# Patient Record
Sex: MALE | Race: OTHER | HISPANIC OR LATINO | Employment: FULL TIME | URBAN - METROPOLITAN AREA
[De-identification: names, ages, dates, MRNs, and addresses within clinical notes are randomized per-mention and may not be internally consistent; named-entity substitution may affect disease eponyms.]

---

## 2023-11-27 ENCOUNTER — HOSPITAL ENCOUNTER (INPATIENT)
Facility: HOSPITAL | Age: 43
LOS: 4 days | Discharge: HOME | End: 2023-12-01
Attending: INTERNAL MEDICINE | Admitting: STUDENT IN AN ORGANIZED HEALTH CARE EDUCATION/TRAINING PROGRAM
Payer: COMMERCIAL

## 2023-11-27 ENCOUNTER — APPOINTMENT (OUTPATIENT)
Dept: RADIOLOGY | Facility: HOSPITAL | Age: 43
End: 2023-11-27
Payer: COMMERCIAL

## 2023-11-27 DIAGNOSIS — R79.89 ELEVATED LFTS: Primary | ICD-10-CM

## 2023-11-27 DIAGNOSIS — K83.1 BILIARY OBSTRUCTION (CMS-HCC): ICD-10-CM

## 2023-11-27 DIAGNOSIS — J98.59 MEDIASTINAL MASS: ICD-10-CM

## 2023-11-27 DIAGNOSIS — E11.9 TYPE 2 DIABETES MELLITUS WITHOUT COMPLICATION, UNSPECIFIED WHETHER LONG TERM INSULIN USE (MULTI): ICD-10-CM

## 2023-11-27 LAB
ALBUMIN SERPL BCP-MCNC: 3.7 G/DL (ref 3.4–5)
ALP SERPL-CCNC: 264 U/L (ref 33–120)
ALT SERPL W P-5'-P-CCNC: 495 U/L (ref 10–52)
ANION GAP SERPL CALC-SCNC: 15 MMOL/L (ref 10–20)
APTT PPP: 31 SECONDS (ref 27–38)
AST SERPL W P-5'-P-CCNC: 222 U/L (ref 9–39)
BASOPHILS # BLD AUTO: 0.02 X10*3/UL (ref 0–0.1)
BASOPHILS NFR BLD AUTO: 0.3 %
BILIRUB DIRECT SERPL-MCNC: 3.5 MG/DL (ref 0–0.3)
BILIRUB SERPL-MCNC: 7.1 MG/DL (ref 0–1.2)
BUN SERPL-MCNC: 5 MG/DL (ref 6–23)
CALCIUM SERPL-MCNC: 8.8 MG/DL (ref 8.6–10.6)
CHLORIDE SERPL-SCNC: 103 MMOL/L (ref 98–107)
CO2 SERPL-SCNC: 25 MMOL/L (ref 21–32)
CREAT SERPL-MCNC: 0.88 MG/DL (ref 0.5–1.3)
CRP SERPL-MCNC: 1.03 MG/DL
D DIMER PPP FEU-MCNC: 1281 NG/ML FEU
EOSINOPHIL # BLD AUTO: 0.08 X10*3/UL (ref 0–0.7)
EOSINOPHIL NFR BLD AUTO: 1.2 %
ERYTHROCYTE [DISTWIDTH] IN BLOOD BY AUTOMATED COUNT: 12.7 % (ref 11.5–14.5)
EST. AVERAGE GLUCOSE BLD GHB EST-MCNC: 166 MG/DL
GFR SERPL CREATININE-BSD FRML MDRD: >90 ML/MIN/1.73M*2
GLUCOSE BLD MANUAL STRIP-MCNC: 116 MG/DL (ref 74–99)
GLUCOSE BLD MANUAL STRIP-MCNC: 140 MG/DL (ref 74–99)
GLUCOSE SERPL-MCNC: 121 MG/DL (ref 74–99)
HBA1C MFR BLD: 7.4 %
HCT VFR BLD AUTO: 41.4 % (ref 41–52)
HGB BLD-MCNC: 13.3 G/DL (ref 13.5–17.5)
IMM GRANULOCYTES # BLD AUTO: 0.01 X10*3/UL (ref 0–0.7)
IMM GRANULOCYTES NFR BLD AUTO: 0.2 % (ref 0–0.9)
INR PPP: 1 (ref 0.9–1.1)
LIPASE SERPL-CCNC: 22 U/L (ref 9–82)
LYMPHOCYTES # BLD AUTO: 1.51 X10*3/UL (ref 1.2–4.8)
LYMPHOCYTES NFR BLD AUTO: 23.4 %
MAGNESIUM SERPL-MCNC: 1.95 MG/DL (ref 1.6–2.4)
MCH RBC QN AUTO: 27.4 PG (ref 26–34)
MCHC RBC AUTO-ENTMCNC: 32.1 G/DL (ref 32–36)
MCV RBC AUTO: 85 FL (ref 80–100)
MONOCYTES # BLD AUTO: 0.7 X10*3/UL (ref 0.1–1)
MONOCYTES NFR BLD AUTO: 10.9 %
NEUTROPHILS # BLD AUTO: 4.13 X10*3/UL (ref 1.2–7.7)
NEUTROPHILS NFR BLD AUTO: 64 %
NRBC BLD-RTO: 0 /100 WBCS (ref 0–0)
PLATELET # BLD AUTO: 268 X10*3/UL (ref 150–450)
POTASSIUM SERPL-SCNC: 4 MMOL/L (ref 3.5–5.3)
PROT SERPL-MCNC: 5.9 G/DL (ref 6.4–8.2)
PROTHROMBIN TIME: 11.5 SECONDS (ref 9.8–12.8)
RBC # BLD AUTO: 4.86 X10*6/UL (ref 4.5–5.9)
SODIUM SERPL-SCNC: 139 MMOL/L (ref 136–145)
T4 FREE SERPL-MCNC: 1.29 NG/DL (ref 0.78–1.48)
TSH SERPL-ACNC: 2.67 MIU/L (ref 0.44–3.98)
WBC # BLD AUTO: 6.5 X10*3/UL (ref 4.4–11.3)

## 2023-11-27 PROCEDURE — 2500000005 HC RX 250 GENERAL PHARMACY W/O HCPCS

## 2023-11-27 PROCEDURE — 85025 COMPLETE CBC W/AUTO DIFF WBC: CPT

## 2023-11-27 PROCEDURE — 71275 CT ANGIOGRAPHY CHEST: CPT | Performed by: RADIOLOGY

## 2023-11-27 PROCEDURE — 83615 LACTATE (LD) (LDH) ENZYME: CPT

## 2023-11-27 PROCEDURE — 86140 C-REACTIVE PROTEIN: CPT

## 2023-11-27 PROCEDURE — 2550000001 HC RX 255 CONTRASTS: Performed by: STUDENT IN AN ORGANIZED HEALTH CARE EDUCATION/TRAINING PROGRAM

## 2023-11-27 PROCEDURE — 2500000004 HC RX 250 GENERAL PHARMACY W/ HCPCS (ALT 636 FOR OP/ED)

## 2023-11-27 PROCEDURE — 84439 ASSAY OF FREE THYROXINE: CPT

## 2023-11-27 PROCEDURE — 83036 HEMOGLOBIN GLYCOSYLATED A1C: CPT

## 2023-11-27 PROCEDURE — 2500000001 HC RX 250 WO HCPCS SELF ADMINISTERED DRUGS (ALT 637 FOR MEDICARE OP)

## 2023-11-27 PROCEDURE — 80053 COMPREHEN METABOLIC PANEL: CPT

## 2023-11-27 PROCEDURE — 71275 CT ANGIOGRAPHY CHEST: CPT

## 2023-11-27 PROCEDURE — 84443 ASSAY THYROID STIM HORMONE: CPT

## 2023-11-27 PROCEDURE — 99222 1ST HOSP IP/OBS MODERATE 55: CPT

## 2023-11-27 PROCEDURE — 82248 BILIRUBIN DIRECT: CPT

## 2023-11-27 PROCEDURE — 36415 COLL VENOUS BLD VENIPUNCTURE: CPT

## 2023-11-27 PROCEDURE — 83690 ASSAY OF LIPASE: CPT

## 2023-11-27 PROCEDURE — 83735 ASSAY OF MAGNESIUM: CPT

## 2023-11-27 PROCEDURE — 85379 FIBRIN DEGRADATION QUANT: CPT

## 2023-11-27 PROCEDURE — 1210000001 HC SEMI-PRIVATE ROOM DAILY

## 2023-11-27 PROCEDURE — 85610 PROTHROMBIN TIME: CPT

## 2023-11-27 PROCEDURE — 82947 ASSAY GLUCOSE BLOOD QUANT: CPT

## 2023-11-27 RX ORDER — ACETAMINOPHEN 325 MG/1
325 TABLET ORAL EVERY 6 HOURS PRN
Status: DISCONTINUED | OUTPATIENT
Start: 2023-11-27 | End: 2023-11-27

## 2023-11-27 RX ORDER — DEXTROSE MONOHYDRATE 100 MG/ML
0.3 INJECTION, SOLUTION INTRAVENOUS ONCE AS NEEDED
Status: DISCONTINUED | OUTPATIENT
Start: 2023-11-27 | End: 2023-12-01 | Stop reason: HOSPADM

## 2023-11-27 RX ORDER — CYANOCOBALAMIN (VITAMIN B-12) 250 MCG
TABLET ORAL DAILY
COMMUNITY

## 2023-11-27 RX ORDER — INSULIN LISPRO 100 [IU]/ML
0-5 INJECTION, SOLUTION INTRAVENOUS; SUBCUTANEOUS EVERY 4 HOURS
Status: DISCONTINUED | OUTPATIENT
Start: 2023-11-27 | End: 2023-11-29

## 2023-11-27 RX ORDER — BUSPIRONE HYDROCHLORIDE 5 MG/1
TABLET ORAL 3 TIMES DAILY PRN
COMMUNITY

## 2023-11-27 RX ORDER — LISINOPRIL 10 MG/1
10 TABLET ORAL DAILY
Status: DISCONTINUED | OUTPATIENT
Start: 2023-11-27 | End: 2023-12-01 | Stop reason: HOSPADM

## 2023-11-27 RX ORDER — OXYCODONE HYDROCHLORIDE 5 MG/1
5 TABLET ORAL EVERY 6 HOURS PRN
Status: DISCONTINUED | OUTPATIENT
Start: 2023-11-27 | End: 2023-11-30

## 2023-11-27 RX ORDER — BUSPIRONE HYDROCHLORIDE 5 MG/1
5 TABLET ORAL 3 TIMES DAILY PRN
Status: DISCONTINUED | OUTPATIENT
Start: 2023-11-27 | End: 2023-12-01 | Stop reason: HOSPADM

## 2023-11-27 RX ORDER — BUPROPION HYDROCHLORIDE 300 MG/1
300 TABLET ORAL DAILY
Status: DISCONTINUED | OUTPATIENT
Start: 2023-11-27 | End: 2023-12-01 | Stop reason: HOSPADM

## 2023-11-27 RX ORDER — LEVOTHYROXINE SODIUM 200 UG/1
200 TABLET ORAL
Status: DISCONTINUED | OUTPATIENT
Start: 2023-11-28 | End: 2023-12-01 | Stop reason: HOSPADM

## 2023-11-27 RX ORDER — DEXTROAMPHETAMINE SACCHARATE, AMPHETAMINE ASPARTATE, DEXTROAMPHETAMINE SULFATE AND AMPHETAMINE SULFATE 1.25; 1.25; 1.25; 1.25 MG/1; MG/1; MG/1; MG/1
7.5 TABLET ORAL
Status: DISCONTINUED | OUTPATIENT
Start: 2023-11-27 | End: 2023-11-27

## 2023-11-27 RX ORDER — BUPROPION HYDROCHLORIDE 300 MG/1
300 TABLET ORAL DAILY
COMMUNITY

## 2023-11-27 RX ORDER — INSULIN LISPRO 100 [IU]/ML
0-5 INJECTION, SOLUTION INTRAVENOUS; SUBCUTANEOUS
Status: DISCONTINUED | OUTPATIENT
Start: 2023-11-27 | End: 2023-11-27

## 2023-11-27 RX ORDER — HYDROMORPHONE HYDROCHLORIDE 1 MG/ML
0.2 INJECTION, SOLUTION INTRAMUSCULAR; INTRAVENOUS; SUBCUTANEOUS
Status: DISCONTINUED | OUTPATIENT
Start: 2023-11-27 | End: 2023-11-27

## 2023-11-27 RX ORDER — ATORVASTATIN CALCIUM 20 MG/1
20 TABLET, FILM COATED ORAL DAILY
Status: DISCONTINUED | OUTPATIENT
Start: 2023-11-28 | End: 2023-12-01 | Stop reason: HOSPADM

## 2023-11-27 RX ORDER — HYDROMORPHONE HYDROCHLORIDE 1 MG/ML
0.2 INJECTION, SOLUTION INTRAMUSCULAR; INTRAVENOUS; SUBCUTANEOUS
Status: DISCONTINUED | OUTPATIENT
Start: 2023-11-27 | End: 2023-12-01 | Stop reason: HOSPADM

## 2023-11-27 RX ORDER — ATORVASTATIN CALCIUM 20 MG/1
20 TABLET, FILM COATED ORAL DAILY
COMMUNITY

## 2023-11-27 RX ORDER — LEVOTHYROXINE SODIUM 200 UG/1
200 TABLET ORAL
COMMUNITY

## 2023-11-27 RX ORDER — LISINOPRIL 10 MG/1
10 TABLET ORAL DAILY
COMMUNITY

## 2023-11-27 RX ORDER — OXYCODONE HYDROCHLORIDE 5 MG/1
2.5 TABLET ORAL EVERY 6 HOURS PRN
Status: DISCONTINUED | OUTPATIENT
Start: 2023-11-27 | End: 2023-11-30

## 2023-11-27 RX ORDER — LIDOCAINE 560 MG/1
1 PATCH PERCUTANEOUS; TOPICAL; TRANSDERMAL DAILY
Status: DISCONTINUED | OUTPATIENT
Start: 2023-11-27 | End: 2023-12-01 | Stop reason: HOSPADM

## 2023-11-27 RX ORDER — DEXTROAMPHETAMINE SACCHARATE, AMPHETAMINE ASPARTATE, DEXTROAMPHETAMINE SULFATE AND AMPHETAMINE SULFATE 3.75; 3.75; 3.75; 3.75 MG/1; MG/1; MG/1; MG/1
15 TABLET ORAL EVERY MORNING
COMMUNITY

## 2023-11-27 RX ORDER — DEXTROSE 50 % IN WATER (D50W) INTRAVENOUS SYRINGE
25
Status: DISCONTINUED | OUTPATIENT
Start: 2023-11-27 | End: 2023-12-01 | Stop reason: HOSPADM

## 2023-11-27 RX ORDER — METFORMIN HYDROCHLORIDE 500 MG/1
500 TABLET ORAL
COMMUNITY

## 2023-11-27 RX ADMIN — SODIUM CHLORIDE, POTASSIUM CHLORIDE, SODIUM LACTATE AND CALCIUM CHLORIDE 500 ML: 600; 310; 30; 20 INJECTION, SOLUTION INTRAVENOUS at 22:11

## 2023-11-27 RX ADMIN — HYDROMORPHONE HYDROCHLORIDE 0.2 MG: 1 INJECTION, SOLUTION INTRAMUSCULAR; INTRAVENOUS; SUBCUTANEOUS at 12:38

## 2023-11-27 RX ADMIN — OXYCODONE HYDROCHLORIDE 5 MG: 5 TABLET ORAL at 17:59

## 2023-11-27 RX ADMIN — LIDOCAINE 1 PATCH: 4 PATCH TOPICAL at 17:59

## 2023-11-27 RX ADMIN — IOHEXOL 73 ML: 350 INJECTION, SOLUTION INTRAVENOUS at 21:45

## 2023-11-27 RX ADMIN — BUPROPION HYDROCHLORIDE 300 MG: 300 TABLET, FILM COATED, EXTENDED RELEASE ORAL at 17:59

## 2023-11-27 RX ADMIN — BUSPIRONE HYDROCHLORIDE 5 MG: 5 TABLET ORAL at 18:03

## 2023-11-27 SDOH — SOCIAL STABILITY: SOCIAL INSECURITY: HAS ANYONE EVER THREATENED TO HURT YOUR FAMILY OR YOUR PETS?: NO

## 2023-11-27 SDOH — SOCIAL STABILITY: SOCIAL INSECURITY: WERE YOU ABLE TO COMPLETE ALL THE BEHAVIORAL HEALTH SCREENINGS?: YES

## 2023-11-27 SDOH — SOCIAL STABILITY: SOCIAL INSECURITY: DO YOU FEEL ANYONE HAS EXPLOITED OR TAKEN ADVANTAGE OF YOU FINANCIALLY OR OF YOUR PERSONAL PROPERTY?: NO

## 2023-11-27 SDOH — SOCIAL STABILITY: SOCIAL INSECURITY: DO YOU FEEL UNSAFE GOING BACK TO THE PLACE WHERE YOU ARE LIVING?: NO

## 2023-11-27 SDOH — SOCIAL STABILITY: SOCIAL INSECURITY: ARE YOU OR HAVE YOU BEEN THREATENED OR ABUSED PHYSICALLY, EMOTIONALLY, OR SEXUALLY BY ANYONE?: NO

## 2023-11-27 SDOH — SOCIAL STABILITY: SOCIAL INSECURITY: ARE THERE ANY APPARENT SIGNS OF INJURIES/BEHAVIORS THAT COULD BE RELATED TO ABUSE/NEGLECT?: NO

## 2023-11-27 SDOH — SOCIAL STABILITY: SOCIAL INSECURITY: HAVE YOU HAD THOUGHTS OF HARMING ANYONE ELSE?: NO

## 2023-11-27 SDOH — SOCIAL STABILITY: SOCIAL INSECURITY: DOES ANYONE TRY TO KEEP YOU FROM HAVING/CONTACTING OTHER FRIENDS OR DOING THINGS OUTSIDE YOUR HOME?: NO

## 2023-11-27 SDOH — SOCIAL STABILITY: SOCIAL INSECURITY: ABUSE: ADULT

## 2023-11-27 ASSESSMENT — COGNITIVE AND FUNCTIONAL STATUS - GENERAL
PATIENT BASELINE BEDBOUND: NO
DAILY ACTIVITIY SCORE: 24
MOBILITY SCORE: 24
DAILY ACTIVITIY SCORE: 24
MOBILITY SCORE: 24

## 2023-11-27 ASSESSMENT — ACTIVITIES OF DAILY LIVING (ADL)
HEARING - LEFT EAR: FUNCTIONAL
DRESSING YOURSELF: INDEPENDENT
GROOMING: INDEPENDENT
TOILETING: INDEPENDENT
PATIENT'S MEMORY ADEQUATE TO SAFELY COMPLETE DAILY ACTIVITIES?: YES
JUDGMENT_ADEQUATE_SAFELY_COMPLETE_DAILY_ACTIVITIES: YES
HEARING - RIGHT EAR: FUNCTIONAL
FEEDING YOURSELF: INDEPENDENT
WALKS IN HOME: INDEPENDENT
ADEQUATE_TO_COMPLETE_ADL: YES
BATHING: INDEPENDENT
LACK_OF_TRANSPORTATION: NO

## 2023-11-27 ASSESSMENT — LIFESTYLE VARIABLES
AUDIT-C TOTAL SCORE: 0
HOW OFTEN DO YOU HAVE 6 OR MORE DRINKS ON ONE OCCASION: NEVER
SKIP TO QUESTIONS 9-10: 1
HOW MANY STANDARD DRINKS CONTAINING ALCOHOL DO YOU HAVE ON A TYPICAL DAY: PATIENT DOES NOT DRINK
AUDIT-C TOTAL SCORE: 0
HOW OFTEN DO YOU HAVE A DRINK CONTAINING ALCOHOL: NEVER

## 2023-11-27 ASSESSMENT — PATIENT HEALTH QUESTIONNAIRE - PHQ9
SUM OF ALL RESPONSES TO PHQ9 QUESTIONS 1 & 2: 0
1. LITTLE INTEREST OR PLEASURE IN DOING THINGS: NOT AT ALL
2. FEELING DOWN, DEPRESSED OR HOPELESS: NOT AT ALL

## 2023-11-27 ASSESSMENT — PAIN SCALES - GENERAL
PAINLEVEL_OUTOF10: 7
PAINLEVEL_OUTOF10: 0 - NO PAIN

## 2023-11-27 ASSESSMENT — COLUMBIA-SUICIDE SEVERITY RATING SCALE - C-SSRS
6. HAVE YOU EVER DONE ANYTHING, STARTED TO DO ANYTHING, OR PREPARED TO DO ANYTHING TO END YOUR LIFE?: NO
2. HAVE YOU ACTUALLY HAD ANY THOUGHTS OF KILLING YOURSELF?: NO
1. IN THE PAST MONTH, HAVE YOU WISHED YOU WERE DEAD OR WISHED YOU COULD GO TO SLEEP AND NOT WAKE UP?: NO

## 2023-11-27 ASSESSMENT — PAIN - FUNCTIONAL ASSESSMENT: PAIN_FUNCTIONAL_ASSESSMENT: 0-10

## 2023-11-27 NOTE — SIGNIFICANT EVENT
Senior staffing note  Please see excellent intern note for more details    Dk Alas is a 43yoM with PMH T2DM (HgbA1c 7.4 11/27), hypothyroid, HTN, HLD, depression anxiety presenting for epigastric pain and nausea/vomiting to OSH, found to have elevated LFTs and transferred to [facility] for potential ERCP.     Yesterday 11/26, patient woke up with midsternal chest pain/epigastric pain around 5:30 AM.  The Trulicity is on often causes gas so he thought it was due to this.  He tried warm tea and baking soda which caused him to burp however did not provide him any relief.  Pain worsens with movement and taking a deep breath.  Does not worsen with eating. Does not radiate to back and no shoulder pain. Pain only improved with morphine which was given at outside hospital.  He vomited 4 times yesterday.  Yesterday he attempted to eat a burger, pizza, cereal, and [place] grape leaves. He was unable to keep any food down.  He is able to tolerate water.  Denies bloody vomitus.  Vomit appeared to be his food being regurgitated.  He attempted to work however he continued to have pain and was unable to tolerate eating so presented to the ED around 8 PM yesterday.  Patient recalls an episode at [facility] couple years ago where he had chest pain which was deemed to be due to anxiety however a scan at that time showed that his gallbladder was inflamed and he was told to follow-up on this outpatient.  He has not had any episodes of pain or nausea vomiting in the interim. Last BM was 2 days ago, firm and brown.  He is burping as well as passing gas per rectum as recently as today.  Denies fevers, chills, night sweats, shortness of breath.  He denies changes in his skin however does endorse that his eyes look yellow when he looks at them and his phone on history.    He endorses a mild dry cough.  He has chronic bilateral shoulder pain due to impingement which is unchanged.  Denies headaches, dizziness, palpitations,  urinary retention, dysuria, urinary urgency or frequency, new numbness, tingling, weakness in the extremities.    PHYSICAL EXAM  Aox4, conversational  Scleral icterus, EOMI  Lungs: CTAB, no rhonchi/wheezing  Heart: RRR, no murmurs  Abd: no worsened pain with palpation, no rebound/guarding, soft  Extrem: 5/5 , 4/5 UE (shoulder impingement bilaterally chronic), 5/5 LE, no LE swelling  Skin: jaundiced  Psych: appropriate mood    PMH: per above:  PSH: none  Social hx: Lives with brother and parents.     Vitals at OSH remained afebrile 98.2 on presentation, pulse 74, blood pressure 144/89, pulse ox 98% on room air    EKG: normal sinus rhythm, no ST changes    Pertinent labs at OSH:  WBC 6.4, hemoglobin 13.7, platelet 285, INR 1.0, D-dimer Quant 238, potassium 4.1, creatinine 0.98, glucose 166, T. bili 4.7D bili 2.0, , , alkaline phosphatase 161, lipase 31, CK1 71,  troponin 4.6, BNP 30    RUQ US  We do not have formal impression for ultrasound however we do have paper document written by tech.  Negative Mcelroy sign.  CBD not visualized due to obscuration by shadowing and overlying bowel gas.  Patient had last eaten at 3 PM.  On gallbladder echogenic debris was seen with possible Matthias sign and/or shadowing bowel.  Hypoechoic areas adjacent to gallbladder wall.  Gallbladder wall was unremarkable at 0.22 cm.  Liver 15.6 cm, increased echogenicity, limited penetration, slightly heterogenous appearance.    CT A/P  CT abdomen pelvis without contrast impression mild distended gallbladder.  Some gallstones at the gallbladder fundus.  Questionable trace pericholecystic fluid near the alvin hepatis.  Consider follow-up with gallbladder ultrasound.  Mild colonic diverticulosis.  No acute diverticulitis.    On presentation to [facility] afebrile temperature 36.6, heart rate 65, blood pressure 152/88, satting 96% on room air.  WBC 6.5, hemoglobin 13.3, platelets 268.  INR 1.0.  CMP with potassium 4.0,  creatinine 0.88.      Alkaline phosphatase increased to 264 from 161, AST decreased to 2 2 from 399, ALT slightly decreased 495 from 526.  T. bili increased 7.1 from 4.7.  T. bili increased 3.5 from 2.0.  Lipase 22.  TSH 2.67, free T41.29, CRP elevated 1.03 hemoglobin A1c 7.4, D-dimer elevated 1281.      ASSESSMENT AND PLAN:  Dk Alas is a 43yoM with PMH T2DM (HgbA1c 7.4 11/27), hypothyroid, HTN, HLD, depression anxiety presenting for epigastric pain and nausea/vomiting to OSH, found to have elevated LFTs and transferred to [facility] for potential ERCP.     Increase in bilirubin concerning for potential obstruction from choledocolithiasis.  Remains afebrile without leukocytosis. Monitor for cholangitis overnight. Will plan for ERCP in AM.     Maintain low threshold for CT PE given elevated D dimer and pleuritic epigastric pain however no known risk factors, satting well on room air, and normal heart rate. D dimer and pain may be due to inflammation in the setting of biliary obstruction. D dimer at OSH had been within their normal limits, D dimer elevated with bilirubin.     #Conjugated hyperbilirubinemia  #Elevated alk phos, elevated AST/ALT  #Epigastric pain  ::Low concern for cholangitis at this time  ::R factor 5.6, lipase wnl and no signs of pancreatitis on CT A/P  ::On OSH CT A/P common bile duct up to 0.6cm  -ERCP in AM, NPO at MN (holding Dvt ppx)  -monitor LFTs  -consider hepatocellular injury workup if ERCP doesn't show anything  -pushing OSH imaging to PACS  -will ask our radiologists to read RUQ US from OSH  -monitor for fever overnight, no tylenol to avoid masking fever  -pain control: lidocaine patch, oxycodone 2.5mg moderate pain, oxycodone 5mg severe pain, dilaudid 0.2mg breakthrough pain    #Pleuritic pain  #Elevated D dimer  -maintain low threshold for CT PE    #T2DM  ::HgbA1c 7.4 11/27  ::Hold home Januvia and metformin  -lispro NPO 4 hrs SSI  -hypoglycemia protocol    #HLD  -hold home atorvastatin  given LFT elevation    #HTN  -hold home lisinopril given potential need for surgery    #Anxiety/depression  -c/w home adderall 15mg, bupropion 300mg daily, buspirone 5mg TID prn    Diet: NPO (for procedure in AM, also given inability to tolerate po intake)  DVT: hold for ERCP in AM  FULL CODE  CHARLESAUGUSTA Brother Joe Bishop [phone]    To be staffed with attending in AM

## 2023-11-27 NOTE — PROGRESS NOTES
SW obtained copy of pt insurance. Insurance information emailed to [facility] insurance for update in Epic. TCC notified.    - Amy Cordon MSW, LSW

## 2023-11-27 NOTE — PROGRESS NOTES
Transitional Care Coordination Progress Note:  Patient discussed during interdisciplinary rounds.  Team members present: MACY DYER  Plan per Medical/Surgical team: [facility], SW consulted due to self pay status.  Payer: Self Pay  Status: Inpatient  Discharge disposition: Home  Potential Barriers: none  ADOD: 11/30  Care coordinator will continue to follow for discharge planning needs.     Pillo Lee RN  Transitional Care Coordinator/TCC  [phone]

## 2023-11-27 NOTE — H&P
HPI:   Mr. Dk Alas is a 43 year old male with a PMHx of T2DM (Metformin 1000mg nightly and dulaglutide 3mg Weekly), HTN, HLD, ADHD (Aderall 15mg OD), Anxiety and depression (on bupropion) who presents as a transfer from [facility] to [facility] for ERCP evaluation.    Yesterday (11/26) patient woke up with midsternal chest pain around 5:30 AM.  The Dulaglutide/Trulicity he takes often causes gas so he thought it was due to this.  He tried warm tea and baking soda which caused him to burp however did not provide him any relief.  Pain worsens with movement and taking a deep breath.  Does not worsen with eating. Does not radiate to back and no shoulder pain. Pain only improved with morphine which was given at outside hospital.  He vomited 4 times yesterday, usually after burping and reports no nausea.  Yesterday he attempted to eat a burger, pizza, cereal, and [place] grape leaves. He was unable to keep any food down.  He is able to tolerate water.  Denies bloody vomitus.  Vomit appeared to be his food being regurgitated.  He attempted to work however he continued to have pain and was unable to tolerate eating so presented to the ED around 8 PM 11/26.      Patient recalls an episode at [facility] couple years ago where he had chest pain which was deemed to be due to anxiety however a scan at that time showed that his gallbladder was inflamed and he was told to follow-up on this outpatient.  He has not had any episodes of pain or nausea vomiting in the interim.    Last BM was 2 days ago, firm and brown.  He is burping as well as passing gas per rectum as recently as today.  Denies fevers, chills, night sweats, shortness of breath.  He denies changes in his skin however does endorse that his eyes look yellow when he looks at them and his phone on history.  He has noted that his urine appears darker and he denies pruritis. He endorses a mild dry cough.  He has chronic bilateral shoulder pain due to  impingement which is unchanged.  Denies headaches, dizziness, palpitations, urinary retention, dysuria, urinary urgency or frequency, new numbness, tingling, weakness in the extremities.    Interventions before transfer:  Unremarkable CBC, BMP, troponin, D-dimer. CXR normal. LFTs showed Total bili 4.7/ Direct Bili 2.0 /  /  / . Lipase 31. Cr 0.98. CT Abdomen and pelvis showed mildly distended gallbladder (up to 4.7cm transverse) with small stones near the fundus (new compared to prior studies per CT read in records) Questionable trace fluid near the alvin hepatis. Normal common bile duct up to 0.6cm. Gallbladder Ultrasound was performed but was limited due to overlying bowel gas and ate food couple hours before presentation - negative goode's sign and gallbladder wall appeared unremarkable at 0.2cm. Liver is increased echogenicity with slightly heterogenous appearance (but CT showed normal Liver).     Given CT abdomen/pelvis findings and severe sudden epigastric pain and scleral icterus, patient received ceftriaxone and flagyl and recommended to have an ERCP for further evaluation and r/o of choledocholithiasis.     No prior colonoscopy or endoscopy.  No family history of gallbladder, liver, abdominal pathologies. F/u with Dr. VINCENT at [facility]     ROS:  12-point ROS reviewed and negative except as mentioned in HPI.     Visit Vitals  /88   Pulse 65   Temp 36.6 °C (97.9 °F) (Temporal)   Resp 20      PAST MEDICAL HISTORY:   Diabetes  Hypothyroid  Hyperlipidemia  Hypertension  Anxiety  Depression  ADHD    PAST SURGICAL HISTORY:   No surgical hx    Medications  Home medications:  - Metformin 1000 mg nightly   - Lisinopril 10 mg daily   - Atorvastatin 20 mg daily   - Levothyroxine 200 mg daily   - Bupropion 300 mg daily (Patient unsure of buspirone dose however an outside hospital charts it in as 10 mg 3 times daily as needed for anxiety)   - Adderall 15 mg daily   - Dulaglutide 3  mg weekly -> however was planning to switch to Mounjaro on 11/26 but has not taken yet because it was back ordered   - B12 and Fish oil     No other over-the-counter's or herbal remedies.     ALL:   Allergies   Allergen Reactions    Ciprofloxacin Hives and Other     Rapid heart rate, fainted    Nsaids (Non-Steroidal Anti-Inflammatory Drug) Hives      FAMILY HISTORY: No family history on file.     SOCIAL HISTORY:   - Living Situation: lives with brother and parents. Has dogs and cats.    - Occupation: He works in [employer].    - Tobacco: Ex-smoker. Used to smoke 1 pack/day for 20 years but quit 2 years ago.    - Alcohol: Last alcohol use was 2 to 3 years ago  - Drug Use: No illicit drug use    PHYSICAL EXAM:   General: awake, alert, conversant, appears stated age  HEENT: pupils equal and round, scleral icterus present, no conjunctivitis or conjunctiva; pallor   Skin: no suspect lesions or rashes noted on visible skin  Chest: ctab, normal respiratory effort, not on supplemental oxygen, lower-Substernal pain on palpation and deep inspiratory effort  Cardiac: regular rate and rhythm, normal s1, s2, no M/R/G, no JVD  Abdomen: soft, ND, no involuntary guarding, epigastric pain on palpation and deep inspiration  : no flank pain or indwelling urinary catheter  EXT: no peripheral edema, no asymmetry noted  MSK: no focal joint swelling noted  Neuro: AOx4, moving all limbs spontaneously, follows commands  Psych: coherent thought process, appropriate mood and affect    Assessment/Plan   Mr. Dk Alas is a 43 year old male with a PMHx of T2DM , HTN, HLD, ADHD, Anxiety and depression who presents as a transfer from [facility] to [facility] for ERCP evaluation. [place] Guidelines 2018 shows likely no cholecystitis. Imaging findings (gallbladder thickening [4.7cm transverse] and stones near fundus) but no signs of inflammation, high bilirubin + scleral icterus with epigastric and substernal (referred) pain. Moreover, CBC showed no  leukocytosis and U/S likely shows no pathology (limited due to PO intake and gas). Low concern for cholecystitis or cholangitis. Abdnormal LFTs with high bili, likely uncomplicated choledocholithiasis or a passed stone from OSH. MRCP vs ERCP to fully r/o choledocholithiasis and possible surgical intervention for cholecystectomy if needed. Lipase normal, unlikely pancreatitis. Hb stable, no hematemesis or melena, unlikely PUD. With regards to him not having BM in 2 days, likely due to low PO intake, will monitor and consider adding laxatives if needed, however currently NPO for possible ERCP vs MRCP     PLAN:  #Large Gallbladder   #Gallstones  #scleral icterus  :: CT A/P 11/26 gallbladder thickening [4.7cm transverse] and stones near fundus  :: [place] Guidelines 2018 unlikely cholecystitis  :: unable to tolerate PO intake, burping then emesis. No leukocytosis  :: OSHTotal bili 4.7/ Direct Bili 2.0 /  /  / .  - NPO  - ERCP vs MRCP to evaluate for choledocholithiasis   - Scleral icterus present  - f/u CBC diff, bili, CMP  - Pain Regimen: Oxycodone 5mg PO Q6 PRN Severe pain // Oxycodone 2.5mg PO Q6 PRN Moderate // Tylenolol 325mg Q6 PRN mild pain // 0.2mg IV Q3 PRN Breakthrough pain    #T2DM  ::Metformin 1000mg nightly and dulaglutide 3mg Weekly  :: However was planning to switch to Mounjaro on 11/26 but has not taken yet because it was back ordered   - f/u HbA1c  - on mild lispro SS    #Anxiety  #Depression  #Adderall  - c/w buproprion   - c/w adderall     #HTN  - c/w home lisinopril 10mg OD    #HLD  - c/w home atorvostatin    #hypothyroidism  - c/w home levothyroxine 200mcg    F: Replete PRN  E: Keep mg >2, phos >3  and K >4  N: NPO Diet; Effective now   A: PIV    DVT: Lovenox Subq 40  GI ppx: none  Bowel care:N/A  Catheter:None  Oxygen:Room Air  Pain regimen: Oxycodone 5mg PO Q6 PRN Severe pain // Oxycodone 2.5mg PO Q6 PRN Moderate // Tylenolol 325mg Q6 PRN mild pain // 0.2mg IV Q3 PRN  Breakthrough pain    Code Status: Full Code   NOK: Brothsimi Alas [phone]    Jacklyn Hay MD  PGY1-Internal Medicine

## 2023-11-28 ENCOUNTER — ANESTHESIA EVENT (OUTPATIENT)
Dept: GASTROENTEROLOGY | Facility: HOSPITAL | Age: 43
End: 2023-11-28
Payer: COMMERCIAL

## 2023-11-28 ENCOUNTER — ANESTHESIA (OUTPATIENT)
Dept: GASTROENTEROLOGY | Facility: HOSPITAL | Age: 43
End: 2023-11-28
Payer: COMMERCIAL

## 2023-11-28 ENCOUNTER — APPOINTMENT (OUTPATIENT)
Dept: GASTROENTEROLOGY | Facility: HOSPITAL | Age: 43
End: 2023-11-28
Payer: COMMERCIAL

## 2023-11-28 ENCOUNTER — APPOINTMENT (OUTPATIENT)
Dept: RADIOLOGY | Facility: HOSPITAL | Age: 43
End: 2023-11-28
Payer: COMMERCIAL

## 2023-11-28 PROBLEM — Z98.890 PONV (POSTOPERATIVE NAUSEA AND VOMITING): Status: ACTIVE | Noted: 2023-11-28

## 2023-11-28 PROBLEM — R11.2 PONV (POSTOPERATIVE NAUSEA AND VOMITING): Status: ACTIVE | Noted: 2023-11-28

## 2023-11-28 LAB
ALBUMIN SERPL BCP-MCNC: 4.3 G/DL (ref 3.4–5)
ALP SERPL-CCNC: 332 U/L (ref 33–120)
ALT SERPL W P-5'-P-CCNC: 368 U/L (ref 10–52)
ANION GAP SERPL CALC-SCNC: 16 MMOL/L (ref 10–20)
AST SERPL W P-5'-P-CCNC: 107 U/L (ref 9–39)
BILIRUB DIRECT SERPL-MCNC: 4.9 MG/DL (ref 0–0.3)
BILIRUB SERPL-MCNC: 8 MG/DL (ref 0–1.2)
BUN SERPL-MCNC: 9 MG/DL (ref 6–23)
CALCIUM SERPL-MCNC: 9.1 MG/DL (ref 8.6–10.6)
CHLORIDE SERPL-SCNC: 98 MMOL/L (ref 98–107)
CO2 SERPL-SCNC: 24 MMOL/L (ref 21–32)
CREAT SERPL-MCNC: 0.91 MG/DL (ref 0.5–1.3)
ERYTHROCYTE [DISTWIDTH] IN BLOOD BY AUTOMATED COUNT: 12.5 % (ref 11.5–14.5)
GFR SERPL CREATININE-BSD FRML MDRD: >90 ML/MIN/1.73M*2
GLUCOSE BLD MANUAL STRIP-MCNC: 113 MG/DL (ref 74–99)
GLUCOSE BLD MANUAL STRIP-MCNC: 124 MG/DL (ref 74–99)
GLUCOSE BLD MANUAL STRIP-MCNC: 129 MG/DL (ref 74–99)
GLUCOSE BLD MANUAL STRIP-MCNC: 130 MG/DL (ref 74–99)
GLUCOSE BLD MANUAL STRIP-MCNC: 205 MG/DL (ref 74–99)
GLUCOSE BLD MANUAL STRIP-MCNC: 221 MG/DL (ref 74–99)
GLUCOSE SERPL-MCNC: 166 MG/DL (ref 74–99)
HCT VFR BLD AUTO: 41.3 % (ref 41–52)
HGB BLD-MCNC: 13.8 G/DL (ref 13.5–17.5)
LDH SERPL L TO P-CCNC: 224 U/L (ref 84–246)
MCH RBC QN AUTO: 28.3 PG (ref 26–34)
MCHC RBC AUTO-ENTMCNC: 33.4 G/DL (ref 32–36)
MCV RBC AUTO: 85 FL (ref 80–100)
NRBC BLD-RTO: 0 /100 WBCS (ref 0–0)
PLATELET # BLD AUTO: 278 X10*3/UL (ref 150–450)
POTASSIUM SERPL-SCNC: 3.9 MMOL/L (ref 3.5–5.3)
PROT SERPL-MCNC: 6.4 G/DL (ref 6.4–8.2)
RBC # BLD AUTO: 4.87 X10*6/UL (ref 4.5–5.9)
SODIUM SERPL-SCNC: 134 MMOL/L (ref 136–145)
WBC # BLD AUTO: 8.7 X10*3/UL (ref 4.4–11.3)

## 2023-11-28 PROCEDURE — C1887 CATHETER, GUIDING: HCPCS | Performed by: INTERNAL MEDICINE

## 2023-11-28 PROCEDURE — 2550000001 HC RX 255 CONTRASTS: Performed by: STUDENT IN AN ORGANIZED HEALTH CARE EDUCATION/TRAINING PROGRAM

## 2023-11-28 PROCEDURE — 43262 ENDO CHOLANGIOPANCREATOGRAPH: CPT | Performed by: INTERNAL MEDICINE

## 2023-11-28 PROCEDURE — 74177 CT ABD & PELVIS W/CONTRAST: CPT | Performed by: RADIOLOGY

## 2023-11-28 PROCEDURE — 2780000003 HC OR 278 NO HCPCS: Performed by: INTERNAL MEDICINE

## 2023-11-28 PROCEDURE — 2720000007 HC OR 272 NO HCPCS: Performed by: INTERNAL MEDICINE

## 2023-11-28 PROCEDURE — 88305 TISSUE EXAM BY PATHOLOGIST: CPT | Performed by: PATHOLOGY

## 2023-11-28 PROCEDURE — A43239 PR EDG TRANSORAL BIOPSY SINGLE/MULTIPLE: Performed by: ANESTHESIOLOGY

## 2023-11-28 PROCEDURE — 2500000001 HC RX 250 WO HCPCS SELF ADMINISTERED DRUGS (ALT 637 FOR MEDICARE OP)

## 2023-11-28 PROCEDURE — A43239 PR EDG TRANSORAL BIOPSY SINGLE/MULTIPLE

## 2023-11-28 PROCEDURE — 84075 ASSAY ALKALINE PHOSPHATASE: CPT

## 2023-11-28 PROCEDURE — 82947 ASSAY GLUCOSE BLOOD QUANT: CPT

## 2023-11-28 PROCEDURE — 88342 IMHCHEM/IMCYTCHM 1ST ANTB: CPT | Mod: TC,SUR | Performed by: INTERNAL MEDICINE

## 2023-11-28 PROCEDURE — 2500000005 HC RX 250 GENERAL PHARMACY W/O HCPCS

## 2023-11-28 PROCEDURE — 43242 EGD US FINE NEEDLE BX/ASPIR: CPT | Performed by: INTERNAL MEDICINE

## 2023-11-28 PROCEDURE — 76705 ECHO EXAM OF ABDOMEN: CPT | Performed by: RADIOLOGY

## 2023-11-28 PROCEDURE — 88312 SPECIAL STAINS GROUP 1: CPT | Performed by: PATHOLOGY

## 2023-11-28 PROCEDURE — 88172 CYTP DX EVAL FNA 1ST EA SITE: CPT | Performed by: PATHOLOGY

## 2023-11-28 PROCEDURE — 7100000009 HC PHASE TWO TIME - INITIAL BASE CHARGE

## 2023-11-28 PROCEDURE — 3700000001 HC GENERAL ANESTHESIA TIME - INITIAL BASE CHARGE: Performed by: INTERNAL MEDICINE

## 2023-11-28 PROCEDURE — 88312 SPECIAL STAINS GROUP 1: CPT | Mod: TC,MCY | Performed by: INTERNAL MEDICINE

## 2023-11-28 PROCEDURE — 43274 ERCP DUCT STENT PLACEMENT: CPT | Performed by: INTERNAL MEDICINE

## 2023-11-28 PROCEDURE — 36415 COLL VENOUS BLD VENIPUNCTURE: CPT

## 2023-11-28 PROCEDURE — 7100000001 HC RECOVERY ROOM TIME - INITIAL BASE CHARGE

## 2023-11-28 PROCEDURE — 2500000004 HC RX 250 GENERAL PHARMACY W/ HCPCS (ALT 636 FOR OP/ED)

## 2023-11-28 PROCEDURE — 85027 COMPLETE CBC AUTOMATED: CPT

## 2023-11-28 PROCEDURE — 7100000002 HC RECOVERY ROOM TIME - EACH INCREMENTAL 1 MINUTE

## 2023-11-28 PROCEDURE — 43239 EGD BIOPSY SINGLE/MULTIPLE: CPT | Performed by: INTERNAL MEDICINE

## 2023-11-28 PROCEDURE — 0FD98ZX EXTRACTION OF COMMON BILE DUCT, VIA NATURAL OR ARTIFICIAL OPENING ENDOSCOPIC, DIAGNOSTIC: ICD-10-PCS | Performed by: INTERNAL MEDICINE

## 2023-11-28 PROCEDURE — 3700000002 HC GENERAL ANESTHESIA TIME - EACH INCREMENTAL 1 MINUTE: Performed by: INTERNAL MEDICINE

## 2023-11-28 PROCEDURE — 43261 ENDO CHOLANGIOPANCREATOGRAPH: CPT | Performed by: INTERNAL MEDICINE

## 2023-11-28 PROCEDURE — 88342 IMHCHEM/IMCYTCHM 1ST ANTB: CPT | Performed by: PATHOLOGY

## 2023-11-28 PROCEDURE — 0F798DZ DILATION OF COMMON BILE DUCT WITH INTRALUMINAL DEVICE, VIA NATURAL OR ARTIFICIAL OPENING ENDOSCOPIC: ICD-10-PCS | Performed by: INTERNAL MEDICINE

## 2023-11-28 PROCEDURE — C2617 STENT, NON-COR, TEM W/O DEL: HCPCS | Performed by: INTERNAL MEDICINE

## 2023-11-28 PROCEDURE — 74177 CT ABD & PELVIS W/CONTRAST: CPT

## 2023-11-28 PROCEDURE — 80053 COMPREHEN METABOLIC PANEL: CPT

## 2023-11-28 PROCEDURE — C1769 GUIDE WIRE: HCPCS | Performed by: INTERNAL MEDICINE

## 2023-11-28 PROCEDURE — 7100000010 HC PHASE TWO TIME - EACH INCREMENTAL 1 MINUTE

## 2023-11-28 PROCEDURE — 76705 ECHO EXAM OF ABDOMEN: CPT

## 2023-11-28 PROCEDURE — 0DB68ZX EXCISION OF STOMACH, VIA NATURAL OR ARTIFICIAL OPENING ENDOSCOPIC, DIAGNOSTIC: ICD-10-PCS | Performed by: INTERNAL MEDICINE

## 2023-11-28 PROCEDURE — 88173 CYTOPATH EVAL FNA REPORT: CPT | Performed by: PATHOLOGY

## 2023-11-28 PROCEDURE — 0W9C3ZX DRAINAGE OF MEDIASTINUM, PERCUTANEOUS APPROACH, DIAGNOSTIC: ICD-10-PCS | Performed by: INTERNAL MEDICINE

## 2023-11-28 PROCEDURE — 1210000001 HC SEMI-PRIVATE ROOM DAILY

## 2023-11-28 RX ORDER — ROCURONIUM BROMIDE 10 MG/ML
INJECTION, SOLUTION INTRAVENOUS AS NEEDED
Status: DISCONTINUED | OUTPATIENT
Start: 2023-11-28 | End: 2023-11-28

## 2023-11-28 RX ORDER — FENTANYL CITRATE 50 UG/ML
INJECTION, SOLUTION INTRAMUSCULAR; INTRAVENOUS AS NEEDED
Status: DISCONTINUED | OUTPATIENT
Start: 2023-11-28 | End: 2023-11-28

## 2023-11-28 RX ORDER — OXYCODONE HYDROCHLORIDE 5 MG/1
5 TABLET ORAL EVERY 4 HOURS PRN
Status: CANCELLED | OUTPATIENT
Start: 2023-11-28

## 2023-11-28 RX ORDER — PROPOFOL 10 MG/ML
INJECTION, EMULSION INTRAVENOUS AS NEEDED
Status: DISCONTINUED | OUTPATIENT
Start: 2023-11-28 | End: 2023-11-28

## 2023-11-28 RX ORDER — LIDOCAINE HYDROCHLORIDE 10 MG/ML
0.1 INJECTION INFILTRATION; PERINEURAL ONCE
Status: CANCELLED | OUTPATIENT
Start: 2023-11-28 | End: 2023-11-28

## 2023-11-28 RX ORDER — SODIUM CHLORIDE, SODIUM LACTATE, POTASSIUM CHLORIDE, CALCIUM CHLORIDE 600; 310; 30; 20 MG/100ML; MG/100ML; MG/100ML; MG/100ML
100 INJECTION, SOLUTION INTRAVENOUS CONTINUOUS
Status: CANCELLED | OUTPATIENT
Start: 2023-11-28

## 2023-11-28 RX ORDER — ONDANSETRON HYDROCHLORIDE 2 MG/ML
INJECTION, SOLUTION INTRAVENOUS AS NEEDED
Status: DISCONTINUED | OUTPATIENT
Start: 2023-11-28 | End: 2023-11-28

## 2023-11-28 RX ORDER — ACETAMINOPHEN 325 MG/1
650 TABLET ORAL EVERY 4 HOURS PRN
Status: CANCELLED | OUTPATIENT
Start: 2023-11-28

## 2023-11-28 RX ORDER — PIPERACILLIN SODIUM, TAZOBACTAM SODIUM 3; .375 G/15ML; G/15ML
INJECTION, POWDER, LYOPHILIZED, FOR SOLUTION INTRAVENOUS AS NEEDED
Status: DISCONTINUED | OUTPATIENT
Start: 2023-11-28 | End: 2023-11-28

## 2023-11-28 RX ORDER — MIDAZOLAM HYDROCHLORIDE 1 MG/ML
INJECTION INTRAMUSCULAR; INTRAVENOUS AS NEEDED
Status: DISCONTINUED | OUTPATIENT
Start: 2023-11-28 | End: 2023-11-28

## 2023-11-28 RX ORDER — LIDOCAINE HYDROCHLORIDE 20 MG/ML
INJECTION, SOLUTION INFILTRATION; PERINEURAL AS NEEDED
Status: DISCONTINUED | OUTPATIENT
Start: 2023-11-28 | End: 2023-11-28

## 2023-11-28 RX ORDER — FENTANYL CITRATE 50 UG/ML
50 INJECTION, SOLUTION INTRAMUSCULAR; INTRAVENOUS EVERY 5 MIN PRN
Status: CANCELLED | OUTPATIENT
Start: 2023-11-28

## 2023-11-28 RX ORDER — ONDANSETRON HYDROCHLORIDE 2 MG/ML
4 INJECTION, SOLUTION INTRAVENOUS ONCE AS NEEDED
Status: CANCELLED | OUTPATIENT
Start: 2023-11-28

## 2023-11-28 RX ADMIN — ONDANSETRON 4 MG: 2 INJECTION INTRAMUSCULAR; INTRAVENOUS at 15:38

## 2023-11-28 RX ADMIN — MIDAZOLAM HYDROCHLORIDE 2 MG: 1 INJECTION, SOLUTION INTRAMUSCULAR; INTRAVENOUS at 13:40

## 2023-11-28 RX ADMIN — LIDOCAINE 1 PATCH: 4 PATCH TOPICAL at 07:49

## 2023-11-28 RX ADMIN — ROCURONIUM BROMIDE 60 MG: 10 INJECTION, SOLUTION INTRAVENOUS at 13:42

## 2023-11-28 RX ADMIN — IOHEXOL 80 ML: 350 INJECTION, SOLUTION INTRAVENOUS at 08:26

## 2023-11-28 RX ADMIN — SUGAMMADEX 200 MG: 100 INJECTION, SOLUTION INTRAVENOUS at 15:38

## 2023-11-28 RX ADMIN — FENTANYL CITRATE 50 MCG: 50 INJECTION, SOLUTION INTRAMUSCULAR; INTRAVENOUS at 13:45

## 2023-11-28 RX ADMIN — DEXTROAMPHETAMINE SACCHARATE, AMPHETAMINE ASPARTATE, DEXTROAMPHETAMINE SULFATE, AND AMPHETAMINE SULFATE 15 MG: 1.25; 1.25; 1.25; 1.25 TABLET ORAL at 08:34

## 2023-11-28 RX ADMIN — LEVOTHYROXINE SODIUM 200 MCG: 0.2 TABLET ORAL at 07:47

## 2023-11-28 RX ADMIN — BUPROPION HYDROCHLORIDE 300 MG: 300 TABLET, FILM COATED, EXTENDED RELEASE ORAL at 07:49

## 2023-11-28 RX ADMIN — SODIUM CHLORIDE, POTASSIUM CHLORIDE, SODIUM LACTATE AND CALCIUM CHLORIDE 500 ML: 600; 310; 30; 20 INJECTION, SOLUTION INTRAVENOUS at 03:58

## 2023-11-28 RX ADMIN — BUSPIRONE HYDROCHLORIDE 5 MG: 5 TABLET ORAL at 07:49

## 2023-11-28 RX ADMIN — ROCURONIUM BROMIDE 20 MG: 10 INJECTION, SOLUTION INTRAVENOUS at 14:34

## 2023-11-28 RX ADMIN — PIPERACILLIN SODIUM AND TAZOBACTAM SODIUM 3.38 G: 3; .375 INJECTION, POWDER, LYOPHILIZED, FOR SOLUTION INTRAVENOUS at 13:59

## 2023-11-28 RX ADMIN — PROPOFOL 200 MG: 10 INJECTION, EMULSION INTRAVENOUS at 13:41

## 2023-11-28 RX ADMIN — FENTANYL CITRATE 50 MCG: 50 INJECTION, SOLUTION INTRAMUSCULAR; INTRAVENOUS at 13:41

## 2023-11-28 RX ADMIN — ROCURONIUM BROMIDE 10 MG: 10 INJECTION, SOLUTION INTRAVENOUS at 13:45

## 2023-11-28 RX ADMIN — LIDOCAINE HYDROCHLORIDE 100 MG: 20 INJECTION, SOLUTION INFILTRATION; PERINEURAL at 13:41

## 2023-11-28 RX ADMIN — SODIUM CHLORIDE, SODIUM LACTATE, POTASSIUM CHLORIDE, AND CALCIUM CHLORIDE: 600; 310; 30; 20 INJECTION, SOLUTION INTRAVENOUS at 13:35

## 2023-11-28 RX ADMIN — OXYCODONE HYDROCHLORIDE 5 MG: 5 TABLET ORAL at 17:32

## 2023-11-28 SDOH — ECONOMIC STABILITY: FOOD INSECURITY: WITHIN THE PAST 12 MONTHS, THE FOOD YOU BOUGHT JUST DIDN’T LAST AND YOU DIDN’T HAVE MONEY TO GET MORE.: NEVER TRUE

## 2023-11-28 SDOH — ECONOMIC STABILITY: TRANSPORTATION INSECURITY

## 2023-11-28 SDOH — ECONOMIC STABILITY: HOUSING INSECURITY: IN THE LAST 12 MONTHS, HOW MANY PLACES HAVE YOU LIVED?: 1

## 2023-11-28 SDOH — ECONOMIC STABILITY: FOOD INSECURITY: WITHIN THE PAST 12 MONTHS, YOU WORRIED THAT YOUR FOOD WOULD RUN OUT BEFORE YOU GOT MONEY TO BUY MORE.: NEVER TRUE

## 2023-11-28 SDOH — ECONOMIC STABILITY: TRANSPORTATION INSECURITY
IN THE PAST 12 MONTHS, HAS THE LACK OF TRANSPORTATION KEPT YOU FROM MEDICAL APPOINTMENTS OR FROM GETTING MEDICATIONS?: NO

## 2023-11-28 SDOH — ECONOMIC STABILITY: HOUSING INSECURITY: IN THE PAST 12 MONTHS HAS THE ELECTRIC, GAS, OIL, OR WATER COMPANY THREATENED TO SHUT OFF SERVICES IN YOUR HOME?: NO

## 2023-11-28 SDOH — ECONOMIC STABILITY: HOUSING INSECURITY: IN THE LAST 12 MONTHS, WAS THERE A TIME WHEN YOU WERE NOT ABLE TO PAY THE MORTGAGE OR RENT ON TIME?: NO

## 2023-11-28 SDOH — ECONOMIC STABILITY: TRANSPORTATION INSECURITY
IN THE PAST 12 MONTHS, HAS LACK OF TRANSPORTATION KEPT YOU FROM MEETINGS, WORK, OR FROM GETTING THINGS NEEDED FOR DAILY LIVING?: NO

## 2023-11-28 SDOH — ECONOMIC STABILITY: HOUSING INSECURITY
IN THE LAST 12 MONTHS, WAS THERE A TIME WHEN YOU DID NOT HAVE A STEADY PLACE TO SLEEP OR SLEPT IN A SHELTER (INCLUDING NOW)?: NO

## 2023-11-28 SDOH — ECONOMIC STABILITY: FOOD INSECURITY: WITHIN THE PAST 12 MONTHS, YOU WORRIED THAT YOUR FOOD WOULD RUN OUT BEFORE YOU GOT THE MONEY TO BUY MORE.: NEVER TRUE

## 2023-11-28 SDOH — ECONOMIC STABILITY: FOOD INSECURITY

## 2023-11-28 SDOH — ECONOMIC STABILITY: GENERAL

## 2023-11-28 SDOH — ECONOMIC STABILITY: INCOME INSECURITY: IN THE LAST 12 MONTHS, WAS THERE A TIME WHEN YOU WERE NOT ABLE TO PAY THE MORTGAGE OR RENT ON TIME?: NO

## 2023-11-28 SDOH — ECONOMIC STABILITY: FOOD INSECURITY: WITHIN THE PAST 12 MONTHS, THE FOOD YOU BOUGHT JUST DIDN'T LAST AND YOU DIDN'T HAVE MONEY TO GET MORE.: NEVER TRUE

## 2023-11-28 SDOH — ECONOMIC STABILITY: HOUSING INSECURITY

## 2023-11-28 SDOH — ECONOMIC STABILITY: TRANSPORTATION INSECURITY: IN THE PAST 12 MONTHS, HAS LACK OF TRANSPORTATION KEPT YOU FROM MEDICAL APPOINTMENTS OR FROM GETTING MEDICATIONS?: NO

## 2023-11-28 ASSESSMENT — SOCIAL DETERMINANTS OF HEALTH (SDOH): IN THE PAST 12 MONTHS, HAS THE ELECTRIC, GAS, OIL, OR WATER COMPANY THREATENED TO SHUT OFF SERVICE IN YOUR HOME?: NO

## 2023-11-28 ASSESSMENT — PAIN SCALES - GENERAL
PAINLEVEL_OUTOF10: 0 - NO PAIN
PAINLEVEL_OUTOF10: 7
PAINLEVEL_OUTOF10: 0 - NO PAIN
PAINLEVEL_OUTOF10: 0 - NO PAIN

## 2023-11-28 ASSESSMENT — PAIN - FUNCTIONAL ASSESSMENT
PAIN_FUNCTIONAL_ASSESSMENT: 0-10

## 2023-11-28 ASSESSMENT — COGNITIVE AND FUNCTIONAL STATUS - GENERAL
MOBILITY SCORE: 24
DAILY ACTIVITIY SCORE: 24

## 2023-11-28 ASSESSMENT — ACTIVITIES OF DAILY LIVING (ADL): LACK_OF_TRANSPORTATION: NO

## 2023-11-28 NOTE — ANESTHESIA PREPROCEDURE EVALUATION
Patient: Dk Alas    Procedure Information       Date/Time: 11/28/23 1030    Scheduled providers: Nathalia Ramos MD; Nano Gomez RN    Procedure: ENDOSCOPIC ULTRASOUND (UPPER)    Location: [facility]            Relevant Problems   Anesthesia   (+) PONV (postoperative nausea and vomiting)      Cardiovascular (within normal limits)      Pulmonary (within normal limits)      GI/Hepatic   (+) Elevated LFTs       Clinical information reviewed:   Tobacco  Allergies  Meds   Med Hx  Surg Hx   Fam Hx  Soc Hx        NPO Detail:  NPO/Void Status  Date of Last Liquid: 11/27/23  Time of Last Liquid: 2300  Date of Last Solid: 11/26/23  Time of Last Solid: 1500         Physical Exam    Airway  Mallampati: II  TM distance: >3 FB  Neck ROM: full     Cardiovascular    Dental - normal exam     Pulmonary    Abdominal            Anesthesia Plan    ASA 2     general     intravenous induction   Trial extubation is planned.  Anesthetic plan and risks discussed with patient.

## 2023-11-28 NOTE — ANESTHESIA PROCEDURE NOTES
Airway  Date/Time: 11/28/2023 1:45 PM  Urgency: elective    Airway not difficult    Staffing  Performed: CRNA   Authorized by: Shirley Tate MD    Performed by: OUSMANE Harrington  Patient location during procedure: OR    Indications and Patient Condition  Indications for airway management: anesthesia and airway protection  Spontaneous Ventilation: absent  Sedation level: deep  Preoxygenated: yes  Patient position: sniffing  Mask difficulty assessment: 1 - vent by mask  Planned trial extubation    Final Airway Details  Final airway type: endotracheal airway      Successful airway: ETT  Cuffed: yes   Successful intubation technique: direct laryngoscopy  Facilitating devices/methods: intubating stylet  Endotracheal tube insertion site: oral  Blade: Day  Blade size: #4  ETT size (mm): 7.5  Cormack-Lehane Classification: grade IIa - partial view of glottis  Placement verified by: chest auscultation and capnometry   Measured from: lips  ETT to lips (cm): 22  Number of attempts at approach: 1  Ventilation between attempts: none  Number of other approaches attempted: 0

## 2023-11-28 NOTE — ANESTHESIA POSTPROCEDURE EVALUATION
Patient: Dk Alas    Procedure Summary       Date: 11/28/23 Room / Location: [facility]    Anesthesia Start: 1322 Anesthesia Stop: 1553    Procedure: ENDOSCOPIC ULTRASOUND (UPPER) Diagnosis: Mediastinal mass    Scheduled Providers: Nathalia Ramos MD; Nano Gomez RN Responsible Provider: Shirley Tate MD    Anesthesia Type: general ASA Status: 2            Anesthesia Type: general    Vitals Value Taken Time   /86 11/28/23 1738   Temp 36.4 °C (97.5 °F) 11/28/23 1738   Pulse 77 11/28/23 1738   Resp 19 11/28/23 1738   SpO2 97 % 11/28/23 1738       Anesthesia Post Evaluation    Patient location during evaluation: PACU  Patient participation: complete - patient participated  Level of consciousness: awake  Pain management: adequate  Airway patency: patent  Cardiovascular status: acceptable  Respiratory status: acceptable  Hydration status: acceptable  Postoperative Nausea and Vomiting: none        No notable events documented.

## 2023-11-28 NOTE — PROGRESS NOTES
Physical Therapy                 Therapy Communication Note    Patient Name: Dk Alas  MRN: [id number]  Today's Date: 11/28/2023     Discipline: Physical Therapy    Missed Visit Reason: Missed Visit Reason:  (@1508 at GI lab)    Missed Time: Attempt      11/28/23 at 3:09 PM - Andreea Thayer PT

## 2023-11-28 NOTE — PROGRESS NOTES
Dk Alas is a 43 y.o. male on day 1 of admission presenting with Elevated LFTs scleral icterus and concern for cholocystitis  Overnight/yesterday: CTPE done due to elevated D-dimer -> no PE found but mediastinal (posterior) found. Plan to biopsy with EUS +/- ERCP on 11/28.     Subjective   Comfortable, lying in bed. Reports no abdominal pain or substernal chest pain. No f/c/s or SOB.  Good inspiratory effort with no end inspiratory pain.     Objective     Physical Exam  General: awake, alert, conversant, appears stated age  HEENT: pupils equal and round, scleral icterus present, no conjunctivitis or conjunctiva; pallor   Skin: no suspect lesions or rashes noted on visible skin  Chest: ctab, normal respiratory effort, not on supplemental oxygen, lower-Substernal pain on palpation and deep inspiratory effort  Cardiac: regular rate and rhythm, normal s1, s2, no M/R/G, no JVD  Abdomen: soft, ND, no involuntary guarding, NT  : no flank pain or indwelling urinary catheter  EXT: no peripheral edema, no asymmetry noted  MSK: no focal joint swelling noted  Neuro: AOx4, moving all limbs spontaneously, follows commands  Psych: coherent thought process, appropriate mood and affect    Last Recorded Vitals  Visit Vitals  /88   Pulse 77   Temp 36.2 °C (97.2 °F)   Resp 20     Intake/Output last 3 Shifts:    Intake/Output Summary (Last 24 hours) at 11/28/2023 1644  Last data filed at 11/28/2023 1540  Gross per 24 hour   Intake 1060 ml   Output --   Net 1060 ml        Relevant Results  Lab Results   Component Value Date     (H) 11/27/2023     (H) 11/27/2023    ALKPHOS 264 (H) 11/27/2023    BILITOT 7.1 (H) 11/27/2023     Lab Results   Component Value Date    WBC 6.5 11/27/2023    HGB 13.3 (L) 11/27/2023    HCT 41.4 11/27/2023    MCV 85 11/27/2023     11/27/2023      Lab Results   Component Value Date    CREATININE 0.88 11/27/2023    BUN 5 (L) 11/27/2023     11/27/2023    K 4.0 11/27/2023      11/27/2023    CO2 25 11/27/2023     D-dimer 1,281    === 11/27/23 ===    CT ABDOMEN PELVIS W IV CONTRAST    - Impression -  Evaluation of the abdomen and pelvis severely limited secondary to  artifact and patient positioning.  1. Persistent distention of the gallbladder with cholelithiasis and  thickening of the fundus and soft tissue stranding surrounding the  cystic duct. Recommend MRCP for further evaluation.  2. No CT evidence of intrahepatic or extrahepatic biliary ductal  dilation within severe limitations of current examination and  underlying choledocholithiasis can not be excluded.    I personally reviewed the images/study and I agree with the findings  as stated by resident Kulwinder Koroma. This study was interpreted  at [place], [place].    MACRO:  None    Signed by: John Mi 11/28/2023 4:12 PM  Dictation workstation:   [id number]    CT-PE IMPRESSION:  1. No evidence of acute pulmonary embolism.  2. Hyperdense posterior mediastinal mass which causes mass effect on  the adjacent esophagus is incompletely characterized on today's  examination but includes calcified subcarinal lymph nodes versus  enhancing mediastinal lesions such as schwannoma or paraganglioma.  3. There is a 1 cm solid nodule adjacent to the right diaphragm.  Incidental Finding:  A solid non-calcified pulmonary nodule measuring  greater than 8 mm.    Assessment/Plan   Mr. Dk Alas is a 43 year old male with a PMHx of T2DM , HTN, HLD, ADHD, Anxiety and depression who presents as a transfer from [facility] to [facility] for ERCP evaluation. CTPE showed posterir mediastinal mass, broad neuroendocrine tumour differential vs lymphoma. Biopsy mass 11/24 with EUS +/- ERCP to further categorize mass nature. LDH (lymphoma) and Beta-hCG (germ cell tumors). Pending EUS + biopsy +/- ERCP 11/28. Jaundice and high direct bilirubin could be due to choledocholithiasis vs stricture.    PLAN:  #Large  Gallbladder   #Gallstones  #scleral icterus  :: CT A/P 11/26 gallbladder thickening [4.7cm transverse] and stones near fundus, no signs of inflammation  :: unable to tolerate PO intake, burping then emesis. No leukocytosis  - NPO  - EUS +/- ERCP 11/28  to evaluate for obstruction causing jaundice   - Scleral icterus present  - Pain Regimen: Oxycodone 5mg PO Q6 PRN Severe pain // Oxycodone 2.5mg PO Q6 PRN Moderate // Tylenolol 325mg Q6 PRN mild pain // 0.2mg IV Q3 PRN Breakthrough pain     #Posterior Mediastinal Mass  :: CT Chest showed hyperdense posterior mediastinal mass. Likely neuroendocrine tumour vs lymphoma.   - LDH (lymphoma) and Beta-hCG (germ cell tumors) sent  - Biopsy on 11/28 durin EUS +/- ERCP    #T2DM  ::Metformin 1000mg nightly and dulaglutide 3mg Weekly  :: However was planning to switch to Mounjaro on 11/26 but has not taken yet because it was back ordered   - f/u HbA1c  - on mild lispro SS     #Anxiety  #Depression  #Adderall  - c/w buproprion   - c/w adderall      #HTN  - c/w home lisinopril 10mg OD     #HLD  - c/w home atorvostatin     #hypothyroidism  - c/w home levothyroxine 200mcg     F: Replete PRN  E: Keep mg >2, phos >3  and K >4  N: NPO Diet; Effective now   A: PIV     DVT: Lovenox Subq 40  GI ppx: none  Bowel care: N/A  Catheter: None  Oxygen:Room Air  Pain regimen: Oxycodone 5mg PO Q6 PRN Severe pain // Oxycodone 2.5mg PO Q6 PRN Moderate // Tylenolol 325mg Q6 PRN mild pain // 0.2mg IV Q3 PRN Breakthrough pain     Code Status: Full Code   NOK: Brother Joe Alas [phone]     Jacklyn Hay MD  PGY1-Internal Medicine

## 2023-11-28 NOTE — PROGRESS NOTES
This SW received note that pt insurance is inactive. Pt is currently off floor, SW will speak with pt about insurance at a later time. TCC notified.  SW will place referral to HRS.    - Amy Cordon MSW, LSW

## 2023-11-29 LAB
ALBUMIN SERPL BCP-MCNC: 3.3 G/DL (ref 3.4–5)
ALP SERPL-CCNC: 326 U/L (ref 33–120)
ALT SERPL W P-5'-P-CCNC: 319 U/L (ref 10–52)
ANION GAP SERPL CALC-SCNC: 12 MMOL/L (ref 10–20)
AST SERPL W P-5'-P-CCNC: 123 U/L (ref 9–39)
B-HCG SERPL-ACNC: <3 MIU/ML
BILIRUB DIRECT SERPL-MCNC: 3 MG/DL (ref 0–0.3)
BILIRUB SERPL-MCNC: 5.5 MG/DL (ref 0–1.2)
BUN SERPL-MCNC: 8 MG/DL (ref 6–23)
CALCIUM SERPL-MCNC: 8.4 MG/DL (ref 8.6–10.6)
CHLORIDE SERPL-SCNC: 101 MMOL/L (ref 98–107)
CO2 SERPL-SCNC: 30 MMOL/L (ref 21–32)
CREAT SERPL-MCNC: 0.95 MG/DL (ref 0.5–1.3)
ERYTHROCYTE [DISTWIDTH] IN BLOOD BY AUTOMATED COUNT: 12.9 % (ref 11.5–14.5)
GFR SERPL CREATININE-BSD FRML MDRD: >90 ML/MIN/1.73M*2
GLUCOSE BLD MANUAL STRIP-MCNC: 154 MG/DL (ref 74–99)
GLUCOSE BLD MANUAL STRIP-MCNC: 199 MG/DL (ref 74–99)
GLUCOSE BLD MANUAL STRIP-MCNC: 203 MG/DL (ref 74–99)
GLUCOSE BLD MANUAL STRIP-MCNC: 204 MG/DL (ref 74–99)
GLUCOSE BLD MANUAL STRIP-MCNC: 227 MG/DL (ref 74–99)
GLUCOSE BLD MANUAL STRIP-MCNC: 252 MG/DL (ref 74–99)
GLUCOSE SERPL-MCNC: 184 MG/DL (ref 74–99)
HCT VFR BLD AUTO: 38.6 % (ref 41–52)
HGB BLD-MCNC: 12.5 G/DL (ref 13.5–17.5)
MAGNESIUM SERPL-MCNC: 2.07 MG/DL (ref 1.6–2.4)
MCH RBC QN AUTO: 27.5 PG (ref 26–34)
MCHC RBC AUTO-ENTMCNC: 32.4 G/DL (ref 32–36)
MCV RBC AUTO: 85 FL (ref 80–100)
NRBC BLD-RTO: 0 /100 WBCS (ref 0–0)
PLATELET # BLD AUTO: 255 X10*3/UL (ref 150–450)
POTASSIUM SERPL-SCNC: 4 MMOL/L (ref 3.5–5.3)
PROT SERPL-MCNC: 5.6 G/DL (ref 6.4–8.2)
RBC # BLD AUTO: 4.55 X10*6/UL (ref 4.5–5.9)
SODIUM SERPL-SCNC: 139 MMOL/L (ref 136–145)
WBC # BLD AUTO: 6.2 X10*3/UL (ref 4.4–11.3)

## 2023-11-29 PROCEDURE — 82248 BILIRUBIN DIRECT: CPT

## 2023-11-29 PROCEDURE — 2500000001 HC RX 250 WO HCPCS SELF ADMINISTERED DRUGS (ALT 637 FOR MEDICARE OP)

## 2023-11-29 PROCEDURE — 2500000004 HC RX 250 GENERAL PHARMACY W/ HCPCS (ALT 636 FOR OP/ED)

## 2023-11-29 PROCEDURE — 84702 CHORIONIC GONADOTROPIN TEST: CPT

## 2023-11-29 PROCEDURE — 80053 COMPREHEN METABOLIC PANEL: CPT

## 2023-11-29 PROCEDURE — 85027 COMPLETE CBC AUTOMATED: CPT

## 2023-11-29 PROCEDURE — 86698 HISTOPLASMA ANTIBODY: CPT | Performed by: STUDENT IN AN ORGANIZED HEALTH CARE EDUCATION/TRAINING PROGRAM

## 2023-11-29 PROCEDURE — 36415 COLL VENOUS BLD VENIPUNCTURE: CPT

## 2023-11-29 PROCEDURE — 82164 ANGIOTENSIN I ENZYME TEST: CPT | Performed by: STUDENT IN AN ORGANIZED HEALTH CARE EDUCATION/TRAINING PROGRAM

## 2023-11-29 PROCEDURE — 2500000005 HC RX 250 GENERAL PHARMACY W/O HCPCS

## 2023-11-29 PROCEDURE — 83735 ASSAY OF MAGNESIUM: CPT

## 2023-11-29 PROCEDURE — 2500000002 HC RX 250 W HCPCS SELF ADMINISTERED DRUGS (ALT 637 FOR MEDICARE OP, ALT 636 FOR OP/ED)

## 2023-11-29 PROCEDURE — 1210000001 HC SEMI-PRIVATE ROOM DAILY

## 2023-11-29 PROCEDURE — 82947 ASSAY GLUCOSE BLOOD QUANT: CPT

## 2023-11-29 PROCEDURE — 99232 SBSQ HOSP IP/OBS MODERATE 35: CPT

## 2023-11-29 RX ORDER — POLYETHYLENE GLYCOL 3350 17 G/17G
17 POWDER, FOR SOLUTION ORAL DAILY PRN
Status: DISCONTINUED | OUTPATIENT
Start: 2023-11-29 | End: 2023-12-01 | Stop reason: HOSPADM

## 2023-11-29 RX ORDER — INSULIN LISPRO 100 [IU]/ML
0-10 INJECTION, SOLUTION INTRAVENOUS; SUBCUTANEOUS
Status: DISCONTINUED | OUTPATIENT
Start: 2023-11-29 | End: 2023-12-01 | Stop reason: HOSPADM

## 2023-11-29 RX ADMIN — INSULIN LISPRO 2 UNITS: 100 INJECTION, SOLUTION INTRAVENOUS; SUBCUTANEOUS at 02:31

## 2023-11-29 RX ADMIN — BUPROPION HYDROCHLORIDE 300 MG: 300 TABLET, FILM COATED, EXTENDED RELEASE ORAL at 09:58

## 2023-11-29 RX ADMIN — INSULIN LISPRO 1 UNITS: 100 INJECTION, SOLUTION INTRAVENOUS; SUBCUTANEOUS at 09:58

## 2023-11-29 RX ADMIN — INSULIN LISPRO 3 UNITS: 100 INJECTION, SOLUTION INTRAVENOUS; SUBCUTANEOUS at 13:21

## 2023-11-29 RX ADMIN — INSULIN LISPRO 4 UNITS: 100 INJECTION, SOLUTION INTRAVENOUS; SUBCUTANEOUS at 19:33

## 2023-11-29 RX ADMIN — POLYETHYLENE GLYCOL 3350 17 G: 17 POWDER, FOR SOLUTION ORAL at 14:32

## 2023-11-29 RX ADMIN — BUSPIRONE HYDROCHLORIDE 5 MG: 5 TABLET ORAL at 09:58

## 2023-11-29 RX ADMIN — INSULIN LISPRO 1 UNITS: 100 INJECTION, SOLUTION INTRAVENOUS; SUBCUTANEOUS at 06:40

## 2023-11-29 RX ADMIN — DEXTROAMPHETAMINE SACCHARATE, AMPHETAMINE ASPARTATE, DEXTROAMPHETAMINE SULFATE, AND AMPHETAMINE SULFATE 15 MG: 1.25; 1.25; 1.25; 1.25 TABLET ORAL at 09:58

## 2023-11-29 RX ADMIN — LEVOTHYROXINE SODIUM 200 MCG: 0.2 TABLET ORAL at 06:40

## 2023-11-29 RX ADMIN — LIDOCAINE 1 PATCH: 4 PATCH TOPICAL at 10:04

## 2023-11-29 ASSESSMENT — COGNITIVE AND FUNCTIONAL STATUS - GENERAL
MOBILITY SCORE: 24
DAILY ACTIVITIY SCORE: 24

## 2023-11-29 ASSESSMENT — ACTIVITIES OF DAILY LIVING (ADL): LACK_OF_TRANSPORTATION: NO

## 2023-11-29 ASSESSMENT — PAIN - FUNCTIONAL ASSESSMENT: PAIN_FUNCTIONAL_ASSESSMENT: 0-10

## 2023-11-29 ASSESSMENT — PAIN SCALES - GENERAL
PAINLEVEL_OUTOF10: 0 - NO PAIN
PAINLEVEL_OUTOF10: 0 - NO PAIN

## 2023-11-29 NOTE — PROGRESS NOTES
11/29/23 1616   Discharge Planning   Living Arrangements Family members;Friends  (lives with brother and friends)   Support Systems Family members   Assistance Needed Pt reports being independent with ADL's including working + driving   Type of Residence Private residence   Do you have animals or pets at home? No   Who is requesting discharge planning? Provider   Home or Post Acute Services None   Patient expects to be discharged to: Home   Does the patient need discharge transport arranged? No  (Pt stated family will pick him up)   Financial Resource Strain   How hard is it for you to pay for the very basics like food, housing, medical care, and heating? Not hard   Housing Stability   In the last 12 months, was there a time when you were not able to pay the mortgage or rent on time? N   In the last 12 months, how many places have you lived? 1   In the last 12 months, was there a time when you did not have a steady place to sleep or slept in a shelter (including now)? N   Transportation Needs   In the past 12 months, has lack of transportation kept you from medical appointments or from getting medications? no   In the past 12 months, has lack of transportation kept you from meetings, work, or from getting things needed for daily living? No   Patient Choice   Provider Choice list and CMS website (https://medicare.gov/care-compare#search) for post-acute Quality and Resource Measure Data were provided and reviewed with: Other (Comment)  (n/a)   Patient / Family choosing to utilize agency / facility established prior to hospitalization No  (n/a)     Met with pt and introduced myself as care coordinator with Care Transitions Team for discharge planning. Pt stated he feels safe at home. Pt denies any falls. Pt's address, phone number, and contact information was verified. SW consulted for insurance verification, at time of admission pt listed as Self Pay, now listed as having Medicaid HMO but stated he has an active  [facility] [employer]/Blue Shield insurance plan.    Home care: none prior to admission.    DME: glucometer + supplies.     PCP: Dr. Edward Hopkins, DO, Phone: [phone] Last appt: 2 months ago Transport to appts: Pt drives himself.  Pharm:  (denies issues affording/obtaining medications)    Discharge Planning: Pt voiced no discharge needs at this time other than a return to work excuse. Medical team updated of above. Care coordinator will continue to follow for discharge planning needs.    Pillo Lee RN  Transitional Care Coordinator/TCC  [phone]

## 2023-11-29 NOTE — PROGRESS NOTES
Dk Alas is a 43 y.o. male on day 2 of admission presenting with CBD stricture and scleral icterus s/p ERCP plastic stent in CBD and multiple necrotic lymph node masses in mediastinum and pancrease.    Subjective   Comfortable, lying in bed. Reports no abdominal pain or substernal chest pain. No f/c/s or SOB.  Good inspiratory effort with no end inspiratory pain. No night sweats or any weight loss. Reports his scleral icterus is improving.     Objective   Physical Exam  General: awake, alert, conversant, appears stated age  HEENT: pupils equal and round, scleral icterus present, no conjunctivitis or conjunctiva; pallor   Skin: no suspect lesions or rashes noted on visible skin  Chest: ctab, normal respiratory effort, not on supplemental oxygen  Cardiac: regular rate and rhythm, normal s1, s2, no M/R/G, no JVD  Abdomen: soft, ND, no involuntary guarding, NT  : no flank pain or indwelling urinary catheter  EXT: no peripheral edema, no asymmetry noted  MSK: no focal joint swelling noted  Neuro: AOx4, moving all limbs spontaneously, follows commands  Psych: coherent thought process, appropriate mood and affect    Last Recorded Vitals  Visit Vitals  /85 (BP Location: Left arm, Patient Position: Sitting)   Pulse 70   Temp 36.5 °C (97.7 °F) (Temporal)   Resp 20     Intake/Output last 3 Shifts:    Intake/Output Summary (Last 24 hours) at 11/29/2023 1438  Last data filed at 11/29/2023 1000  Gross per 24 hour   Intake 1540 ml   Output --   Net 1540 ml        Relevant Results  Lab Results   Component Value Date     (H) 11/29/2023     (H) 11/29/2023    ALKPHOS 326 (H) 11/29/2023    BILITOT 5.5 (H) 11/29/2023     Lab Results   Component Value Date    WBC 6.2 11/29/2023    HGB 12.5 (L) 11/29/2023    HCT 38.6 (L) 11/29/2023    MCV 85 11/29/2023     11/29/2023      Lab Results   Component Value Date    CREATININE 0.95 11/29/2023    BUN 8 11/29/2023     11/29/2023    K 4.0 11/29/2023      11/29/2023    CO2 30 11/29/2023     D-dimer 1,281    Assessment/Plan   Mr. Dk Alas is a 43 year old male with a PMHx of T2DM , HTN, HLD, ADHD, Anxiety and depression who presents as a transfer from [facility] to [facility] for ERCP evaluation. CTPE showed posterior mediastinal mass, broad neuroendocrine tumour differential vs lymphoma. LDH normal, pending beta-hcg. EUS + ERCP 11/28 -> biopsy of mediastinal mass, necrotic and growing atypical cells, pending pathology. Moreover, stricture in CBD found, plastic stent placed. Pancreatic lymph node masses noted similar to mediastinal mass, also necrotic. Unable to assess clearly by EUS. MRI w and w/o contrast pancrease to further evaluate. Stricture in CBD, small ulcers in duodenum, and gallbladder with chololithiasis shows likely passed gallstone. Patient will need a cholecystectomy in the future, repeat ercp in 6 weeks due to plastic stent.     PLAN:  #Necrotic mediastinal lymph node and lobo-pancreatic lymph nodes  :: CT Chest showed hyperdense posterior mediastinal mass. Likely neuroendocrine tumour vs lymphoma.   :: EUS + ERCP 11/28 -> biopsy of mediastinal mass, necrotic and growing atypical cells, pending pathology. Pancreatic lymph node masses noted similar to mediastinal mass, also necrotic. Unable to assess clearly by EUS.  - MRI w and w/o contrast pancrease to further evaluate.  - LDH normal pending Beta-HCG  - pending biopsy formal results    #Large Gallbladder   #chololithiasis  #scleral icterus  :: CT A/P 11/26 gallbladder thickening [4.7cm transverse] and stones near fundus, no signs of inflammation  - EUS + ERCP 11/28 -> Moreover, stricture in CBD found, plastic stent placed.   - Stricture in CBD, small ulcers in duodenum, and gallbladder with chololithiasis shows likely passed gallstone.   - Patient will need a cholecystectomy in the future, repeat ercp in 6 weeks due to plastic stent.   - regular diet  - Scleral icterus present and improving  - Pain  Regimen: Oxycodone 5mg PO Q6 PRN Severe pain // Oxycodone 2.5mg PO Q6 PRN Moderate // Tylenolol 325mg Q6 PRN mild pain // 0.2mg IV Q3 PRN Breakthrough pain    #T2DM  :: Metformin 1000mg nightly and dulaglutide 3mg Weekly  :: However was planning to switch to Mounjaro on 11/26 but has not taken yet because it was back ordered   :: HbA1c 7.4  - Increased lispro sliding scale     #Anxiety  #Depression  #Adderall  - c/w buproprion   - c/w adderall      #HTN  - c/w home lisinopril 10mg OD     #HLD  - c/w home atorvostatin     #hypothyroidism  - c/w home levothyroxine 200mcg     F: Replete PRN  E: Keep mg >2, phos >3  and K >4  N: NPO Diet; Effective now   A: PIV     DVT: Lovenox Subq 40  GI ppx: none  Bowel care: N/A  Catheter: None  Oxygen:Room Air  Pain regimen: Oxycodone 5mg PO Q6 PRN Severe pain // Oxycodone 2.5mg PO Q6 PRN Moderate // Tylenolol 325mg Q6 PRN mild pain // 0.2mg IV Q3 PRN Breakthrough pain     Code Status: Full Code   NOK: Brother Joe Alas [phone]     Jacklyn Hay MD  PGY1-Internal Medicine

## 2023-11-29 NOTE — PROGRESS NOTES
Physical Therapy                 Therapy Communication Note    Patient Name: Dk Alas  MRN: [id number]  Today's Date: 11/29/2023     Discipline: Physical Therapy    Missed Visit Reason: Missed Visit Reason:  (PT SCREEN- chart reviewed, met with pt who states he is up indep, without difficulty, denies PT needs.  RN confirms pt is indep.  Eval deferred, will dc orders.)    Missed Time: Attempt

## 2023-11-30 ENCOUNTER — APPOINTMENT (OUTPATIENT)
Dept: RADIOLOGY | Facility: HOSPITAL | Age: 43
End: 2023-11-30
Payer: COMMERCIAL

## 2023-11-30 DIAGNOSIS — K83.1 OBSTRUCTIVE JAUNDICE (CMS-HCC): Primary | ICD-10-CM

## 2023-11-30 PROBLEM — Z98.890 PONV (POSTOPERATIVE NAUSEA AND VOMITING): Status: RESOLVED | Noted: 2023-11-28 | Resolved: 2023-11-30

## 2023-11-30 PROBLEM — R11.2 PONV (POSTOPERATIVE NAUSEA AND VOMITING): Status: RESOLVED | Noted: 2023-11-28 | Resolved: 2023-11-30

## 2023-11-30 PROBLEM — R79.89 ELEVATED LFTS: Status: RESOLVED | Noted: 2023-11-27 | Resolved: 2023-11-30

## 2023-11-30 LAB
ALBUMIN SERPL BCP-MCNC: 3.5 G/DL (ref 3.4–5)
ALP SERPL-CCNC: 296 U/L (ref 33–120)
ALT SERPL W P-5'-P-CCNC: 287 U/L (ref 10–52)
ANION GAP SERPL CALC-SCNC: 14 MMOL/L (ref 10–20)
APTT PPP: 33 SECONDS (ref 27–38)
AST SERPL W P-5'-P-CCNC: 86 U/L (ref 9–39)
BASOPHILS # BLD AUTO: 0.05 X10*3/UL (ref 0–0.1)
BASOPHILS NFR BLD AUTO: 1 %
BILIRUB DIRECT SERPL-MCNC: 1.3 MG/DL (ref 0–0.3)
BILIRUB SERPL-MCNC: 3 MG/DL (ref 0–1.2)
BUN SERPL-MCNC: 8 MG/DL (ref 6–23)
CALCIUM SERPL-MCNC: 8.8 MG/DL (ref 8.6–10.6)
CHLORIDE SERPL-SCNC: 106 MMOL/L (ref 98–107)
CO2 SERPL-SCNC: 24 MMOL/L (ref 21–32)
CREAT SERPL-MCNC: 0.84 MG/DL (ref 0.5–1.3)
EOSINOPHIL # BLD AUTO: 0.2 X10*3/UL (ref 0–0.7)
EOSINOPHIL NFR BLD AUTO: 3.8 %
ERYTHROCYTE [DISTWIDTH] IN BLOOD BY AUTOMATED COUNT: 13.1 % (ref 11.5–14.5)
FERRITIN SERPL-MCNC: 105 NG/ML (ref 20–300)
GFR SERPL CREATININE-BSD FRML MDRD: >90 ML/MIN/1.73M*2
GLUCOSE BLD MANUAL STRIP-MCNC: 163 MG/DL (ref 74–99)
GLUCOSE BLD MANUAL STRIP-MCNC: 210 MG/DL (ref 74–99)
GLUCOSE BLD MANUAL STRIP-MCNC: 243 MG/DL (ref 74–99)
GLUCOSE BLD MANUAL STRIP-MCNC: 254 MG/DL (ref 74–99)
GLUCOSE SERPL-MCNC: 129 MG/DL (ref 74–99)
HCT VFR BLD AUTO: 40.2 % (ref 41–52)
HGB BLD-MCNC: 12.9 G/DL (ref 13.5–17.5)
HIV 1+2 AB+HIV1 P24 AG SERPL QL IA: NONREACTIVE
IGA SERPL-MCNC: 248 MG/DL (ref 70–400)
IGG SERPL-MCNC: 989 MG/DL (ref 700–1600)
IGM SERPL-MCNC: 51 MG/DL (ref 40–230)
IMM GRANULOCYTES # BLD AUTO: 0.04 X10*3/UL (ref 0–0.7)
IMM GRANULOCYTES NFR BLD AUTO: 0.8 % (ref 0–0.9)
INR PPP: 1 (ref 0.9–1.1)
IRON SATN MFR SERPL: 12 % (ref 25–45)
IRON SERPL-MCNC: 40 UG/DL (ref 35–150)
LYMPHOCYTES # BLD AUTO: 1.72 X10*3/UL (ref 1.2–4.8)
LYMPHOCYTES NFR BLD AUTO: 33 %
MAGNESIUM SERPL-MCNC: 1.93 MG/DL (ref 1.6–2.4)
MCH RBC QN AUTO: 27.4 PG (ref 26–34)
MCHC RBC AUTO-ENTMCNC: 32.1 G/DL (ref 32–36)
MCV RBC AUTO: 86 FL (ref 80–100)
MONOCYTES # BLD AUTO: 0.5 X10*3/UL (ref 0.1–1)
MONOCYTES NFR BLD AUTO: 9.6 %
NEUTROPHILS # BLD AUTO: 2.7 X10*3/UL (ref 1.2–7.7)
NEUTROPHILS NFR BLD AUTO: 51.8 %
NRBC BLD-RTO: 0 /100 WBCS (ref 0–0)
PLATELET # BLD AUTO: 253 X10*3/UL (ref 150–450)
POTASSIUM SERPL-SCNC: 3.9 MMOL/L (ref 3.5–5.3)
PROT SERPL-MCNC: 6.3 G/DL (ref 6.4–8.2)
PROTHROMBIN TIME: 11.3 SECONDS (ref 9.8–12.8)
RBC # BLD AUTO: 4.7 X10*6/UL (ref 4.5–5.9)
SODIUM SERPL-SCNC: 140 MMOL/L (ref 136–145)
TIBC SERPL-MCNC: 325 UG/DL (ref 240–445)
UIBC SERPL-MCNC: 285 UG/DL (ref 110–370)
WBC # BLD AUTO: 5.2 X10*3/UL (ref 4.4–11.3)

## 2023-11-30 PROCEDURE — A9575 INJ GADOTERATE MEGLUMI 0.1ML: HCPCS | Performed by: STUDENT IN AN ORGANIZED HEALTH CARE EDUCATION/TRAINING PROGRAM

## 2023-11-30 PROCEDURE — 86481 TB AG RESPONSE T-CELL SUSP: CPT | Performed by: STUDENT IN AN ORGANIZED HEALTH CARE EDUCATION/TRAINING PROGRAM

## 2023-11-30 PROCEDURE — 36415 COLL VENOUS BLD VENIPUNCTURE: CPT | Performed by: STUDENT IN AN ORGANIZED HEALTH CARE EDUCATION/TRAINING PROGRAM

## 2023-11-30 PROCEDURE — 82306 VITAMIN D 25 HYDROXY: CPT | Performed by: STUDENT IN AN ORGANIZED HEALTH CARE EDUCATION/TRAINING PROGRAM

## 2023-11-30 PROCEDURE — 83540 ASSAY OF IRON: CPT

## 2023-11-30 PROCEDURE — 83010 ASSAY OF HAPTOGLOBIN QUANT: CPT

## 2023-11-30 PROCEDURE — 80053 COMPREHEN METABOLIC PANEL: CPT

## 2023-11-30 PROCEDURE — 86612 BLASTOMYCES ANTIBODY: CPT | Performed by: STUDENT IN AN ORGANIZED HEALTH CARE EDUCATION/TRAINING PROGRAM

## 2023-11-30 PROCEDURE — 2500000001 HC RX 250 WO HCPCS SELF ADMINISTERED DRUGS (ALT 637 FOR MEDICARE OP)

## 2023-11-30 PROCEDURE — 1210000001 HC SEMI-PRIVATE ROOM DAILY

## 2023-11-30 PROCEDURE — 82784 ASSAY IGA/IGD/IGG/IGM EACH: CPT

## 2023-11-30 PROCEDURE — 82728 ASSAY OF FERRITIN: CPT

## 2023-11-30 PROCEDURE — 83735 ASSAY OF MAGNESIUM: CPT

## 2023-11-30 PROCEDURE — 2550000001 HC RX 255 CONTRASTS: Performed by: STUDENT IN AN ORGANIZED HEALTH CARE EDUCATION/TRAINING PROGRAM

## 2023-11-30 PROCEDURE — 87389 HIV-1 AG W/HIV-1&-2 AB AG IA: CPT

## 2023-11-30 PROCEDURE — 99232 SBSQ HOSP IP/OBS MODERATE 35: CPT

## 2023-11-30 PROCEDURE — 2500000004 HC RX 250 GENERAL PHARMACY W/ HCPCS (ALT 636 FOR OP/ED)

## 2023-11-30 PROCEDURE — 87385 HISTOPLASMA CAPSUL AG IA: CPT | Performed by: STUDENT IN AN ORGANIZED HEALTH CARE EDUCATION/TRAINING PROGRAM

## 2023-11-30 PROCEDURE — 82248 BILIRUBIN DIRECT: CPT

## 2023-11-30 PROCEDURE — 85025 COMPLETE CBC W/AUTO DIFF WBC: CPT

## 2023-11-30 PROCEDURE — 36415 COLL VENOUS BLD VENIPUNCTURE: CPT

## 2023-11-30 PROCEDURE — 85610 PROTHROMBIN TIME: CPT

## 2023-11-30 PROCEDURE — 74183 MRI ABD W/O CNTR FLWD CNTR: CPT

## 2023-11-30 PROCEDURE — 82947 ASSAY GLUCOSE BLOOD QUANT: CPT

## 2023-11-30 RX ORDER — GADOTERATE MEGLUMINE 376.9 MG/ML
20 INJECTION INTRAVENOUS
Status: COMPLETED | OUTPATIENT
Start: 2023-11-30 | End: 2023-11-30

## 2023-11-30 RX ADMIN — INSULIN LISPRO 4 UNITS: 100 INJECTION, SOLUTION INTRAVENOUS; SUBCUTANEOUS at 18:53

## 2023-11-30 RX ADMIN — DEXTROAMPHETAMINE SACCHARATE, AMPHETAMINE ASPARTATE, DEXTROAMPHETAMINE SULFATE, AND AMPHETAMINE SULFATE 15 MG: 1.25; 1.25; 1.25; 1.25 TABLET ORAL at 08:21

## 2023-11-30 RX ADMIN — BUPROPION HYDROCHLORIDE 300 MG: 300 TABLET, FILM COATED, EXTENDED RELEASE ORAL at 09:00

## 2023-11-30 RX ADMIN — INSULIN LISPRO 4 UNITS: 100 INJECTION, SOLUTION INTRAVENOUS; SUBCUTANEOUS at 13:46

## 2023-11-30 RX ADMIN — POLYETHYLENE GLYCOL 3350 17 G: 17 POWDER, FOR SOLUTION ORAL at 08:21

## 2023-11-30 RX ADMIN — BUSPIRONE HYDROCHLORIDE 5 MG: 5 TABLET ORAL at 08:21

## 2023-11-30 RX ADMIN — LEVOTHYROXINE SODIUM 200 MCG: 0.2 TABLET ORAL at 08:21

## 2023-11-30 RX ADMIN — GADOTERATE MEGLUMINE 20 ML: 376.9 INJECTION INTRAVENOUS at 07:37

## 2023-11-30 ASSESSMENT — PAIN SCALES - GENERAL: PAINLEVEL_OUTOF10: 0 - NO PAIN

## 2023-11-30 ASSESSMENT — COGNITIVE AND FUNCTIONAL STATUS - GENERAL
MOBILITY SCORE: 24
DAILY ACTIVITIY SCORE: 24

## 2023-11-30 ASSESSMENT — PAIN - FUNCTIONAL ASSESSMENT: PAIN_FUNCTIONAL_ASSESSMENT: 0-10

## 2023-11-30 NOTE — PROGRESS NOTES
Dk Alas is a 43 y.o. male on day 3 of admission presenting with CBD stricture and scleral icterus s/p ERCP plastic stent in CBD and multiple necrotic lymph node masses in mediastinum and pancreas.     Subjective   Comfortable, lying in bed. Reports no abdominal pain or substernal chest pain. No f/c/s or SOB.  Good inspiratory effort with no end inspiratory pain. No night sweats or any weight loss. Similar scleral icterus.    Objective   Physical Exam  General: awake, alert, conversant, appears stated age  HEENT: pupils equal and round, scleral icterus present, no conjunctivitis or conjunctiva; pallor   Skin: no suspect lesions or rashes noted on visible skin  Chest: ctab, normal respiratory effort, not on supplemental oxygen  Cardiac: regular rate and rhythm, normal s1, s2, no M/R/G, no JVD  Abdomen: soft, ND, no involuntary guarding, NT  : no flank pain or indwelling urinary catheter  EXT: no peripheral edema, no asymmetry noted  MSK: no focal joint swelling noted  Neuro: AOx4, moving all limbs spontaneously, follows commands  Psych: coherent thought process, appropriate mood and affect    Last Recorded Vitals  Visit Vitals  /75   Pulse 77   Temp 36.5 °C (97.7 °F)   Resp 16     Intake/Output last 3 Shifts:    Intake/Output Summary (Last 24 hours) at 11/30/2023 1427  Last data filed at 11/29/2023 1700  Gross per 24 hour   Intake 240 ml   Output --   Net 240 ml        Relevant Results  Lab Results   Component Value Date     (H) 11/30/2023    AST 86 (H) 11/30/2023    ALKPHOS 296 (H) 11/30/2023    BILITOT 3.0 (H) 11/30/2023     Lab Results   Component Value Date    WBC 5.2 11/30/2023    HGB 12.9 (L) 11/30/2023    HCT 40.2 (L) 11/30/2023    MCV 86 11/30/2023     11/30/2023      Lab Results   Component Value Date    CREATININE 0.84 11/30/2023    BUN 8 11/30/2023     11/30/2023    K 3.9 11/30/2023     11/30/2023    CO2 24 11/30/2023     D-dimer 1,281    Assessment/Plan   Mr. Dk Alas  is a 43 year old male with a PMHx of T2DM , HTN, HLD, ADHD, Anxiety and depression who presents as a transfer from [facility] to [facility] for ERCP evaluation. CTPE showed posterior mediastinal mass, broad neuroendocrine tumour differential vs lymphoma. LDH normal, pending beta-hcg. EUS + ERCP 11/28 -> biopsy of mediastinal mass, necrotic and growing atypical cells, pending pathology. Moreover, stricture in CBD found, plastic stent placed. Pancreatic lymph node masses noted similar to mediastinal mass, also necrotic. Unable to assess clearly by EUS. MRI w and w/o contrast pancrease to further evaluate, pending final path but shows calcified necrotic nodes around pancrease. Stricture in CBD, small ulcers in duodenum, and gallbladder with chololithiasis shows likely passed gallstone. Patient will need a cholecystectomy in the future, repeat ercp in 6 weeks due to plastic stent. Biopsies of ERCP sent, pending.     Updates 11/30  - Granulomatous disease workup sent: HIV 1/2, histo, blastomyces, Immunoglobulins, Iron studies, haptoglobin, T-spot TB, ferritin  - MRI pancrease done, pending final report. Shows calcified nodes around pancrease.   - pending biopsy results    PLAN:  #Necrotic mediastinal lymph node and lobo-pancreatic lymph nodes  :: CT Chest showed hyperdense posterior mediastinal mass. Likely neuroendocrine tumour vs lymphoma.   :: EUS + ERCP 11/28 -> biopsy of mediastinal mass, necrotic and growing atypical cells, pending pathology. Pancreatic lymph node masses noted similar to mediastinal mass, also necrotic. Unable to assess clearly by EUS.  - MRI w and w/o contrast pancrease to further evaluate.  - LDH and B-hcg normal  - Granulomatous disease workup sent: HIV 1/2, histo, blastomyces, Immunoglobulins, Iron studies, haptoglobin, T-spot TB, ferritin  - pending biopsy formal results    #Large Gallbladder   #chololithiasis  #scleral icterus  :: CT A/P 11/26 gallbladder thickening [4.7cm transverse] and  stones near fundus, no signs of inflammation  - EUS + ERCP 11/28 -> Moreover, stricture in CBD found, plastic stent placed.   - Stricture in CBD, small ulcers in duodenum, and gallbladder with chololithiasis shows likely passed gallstone.   - Patient will need a cholecystectomy in the future, repeat ercp in 6 weeks due to plastic stent.   - regular diet  - Scleral icterus present and improving  - Pain Regimen: Oxycodone 5mg PO Q6 PRN Severe pain // Oxycodone 2.5mg PO Q6 PRN Moderate // Tylenolol 325mg Q6 PRN mild pain // 0.2mg IV Q3 PRN Breakthrough pain    #T2DM  :: Metformin 1000mg nightly and dulaglutide 3mg Weekly  :: However was planning to switch to Mounjaro on 11/26 but has not taken yet because it was back ordered   :: HbA1c 7.4  - Increased lispro sliding scale     #Anxiety  #Depression  #Adderall  - c/w buproprion   - c/w adderall      #HTN  - c/w home lisinopril 10mg OD     #HLD  - c/w home atorvostatin     #hypothyroidism  - c/w home levothyroxine 200mcg     F: Replete PRN  E: Keep mg >2, phos >3  and K >4  N: NPO Diet; Effective now   A: PIV     DVT: Lovenox Subq 40  GI ppx: none  Bowel care: N/A  Catheter: None  Oxygen:Room Air  Pain regimen: Oxycodone 5mg PO Q6 PRN Severe pain // Oxycodone 2.5mg PO Q6 PRN Moderate // Tylenolol 325mg Q6 PRN mild pain // 0.2mg IV Q3 PRN Breakthrough pain     Code Status: Full Code   NOK: Rosalvaer Joe Alas [phone]     Jacklyn Hay MD  PGY1-Internal Medicine

## 2023-11-30 NOTE — CARE PLAN
The patient's goals for the shift include      The clinical goals for the shift include Patient will verbalize pain free throguhout this shift

## 2023-12-01 VITALS
WEIGHT: 244.27 LBS | HEART RATE: 63 BPM | BODY MASS INDEX: 34.97 KG/M2 | RESPIRATION RATE: 16 BRPM | OXYGEN SATURATION: 94 % | TEMPERATURE: 97.9 F | SYSTOLIC BLOOD PRESSURE: 130 MMHG | DIASTOLIC BLOOD PRESSURE: 76 MMHG | HEIGHT: 70 IN

## 2023-12-01 LAB
25(OH)D3 SERPL-MCNC: 23 NG/ML (ref 30–100)
ALBUMIN SERPL BCP-MCNC: 3.6 G/DL (ref 3.4–5)
ALP SERPL-CCNC: 272 U/L (ref 33–120)
ALT SERPL W P-5'-P-CCNC: 270 U/L (ref 10–52)
ANION GAP SERPL CALC-SCNC: 15 MMOL/L (ref 10–20)
AST SERPL W P-5'-P-CCNC: 78 U/L (ref 9–39)
BASOPHILS # BLD AUTO: 0.04 X10*3/UL (ref 0–0.1)
BASOPHILS NFR BLD AUTO: 0.7 %
BILIRUB DIRECT SERPL-MCNC: 0.7 MG/DL (ref 0–0.3)
BILIRUB SERPL-MCNC: 1.7 MG/DL (ref 0–1.2)
BUN SERPL-MCNC: 12 MG/DL (ref 6–23)
CALCIUM SERPL-MCNC: 8.5 MG/DL (ref 8.6–10.6)
CHLORIDE SERPL-SCNC: 103 MMOL/L (ref 98–107)
CO2 SERPL-SCNC: 24 MMOL/L (ref 21–32)
CREAT SERPL-MCNC: 0.99 MG/DL (ref 0.5–1.3)
EOSINOPHIL # BLD AUTO: 0.16 X10*3/UL (ref 0–0.7)
EOSINOPHIL NFR BLD AUTO: 2.8 %
ERYTHROCYTE [DISTWIDTH] IN BLOOD BY AUTOMATED COUNT: 13.2 % (ref 11.5–14.5)
GFR SERPL CREATININE-BSD FRML MDRD: >90 ML/MIN/1.73M*2
GLUCOSE BLD MANUAL STRIP-MCNC: 202 MG/DL (ref 74–99)
GLUCOSE BLD MANUAL STRIP-MCNC: 274 MG/DL (ref 74–99)
GLUCOSE SERPL-MCNC: 286 MG/DL (ref 74–99)
HAPTOGLOB SERPL-MCNC: 150 MG/DL (ref 37–246)
HCT VFR BLD AUTO: 40.4 % (ref 41–52)
HGB BLD-MCNC: 12.9 G/DL (ref 13.5–17.5)
IMM GRANULOCYTES # BLD AUTO: 0.02 X10*3/UL (ref 0–0.7)
IMM GRANULOCYTES NFR BLD AUTO: 0.3 % (ref 0–0.9)
LYMPHOCYTES # BLD AUTO: 1.58 X10*3/UL (ref 1.2–4.8)
LYMPHOCYTES NFR BLD AUTO: 27.4 %
MAGNESIUM SERPL-MCNC: 2.02 MG/DL (ref 1.6–2.4)
MCH RBC QN AUTO: 27.7 PG (ref 26–34)
MCHC RBC AUTO-ENTMCNC: 31.9 G/DL (ref 32–36)
MCV RBC AUTO: 87 FL (ref 80–100)
MONOCYTES # BLD AUTO: 0.47 X10*3/UL (ref 0.1–1)
MONOCYTES NFR BLD AUTO: 8.1 %
NEUTROPHILS # BLD AUTO: 3.5 X10*3/UL (ref 1.2–7.7)
NEUTROPHILS NFR BLD AUTO: 60.7 %
NRBC BLD-RTO: 0 /100 WBCS (ref 0–0)
PLATELET # BLD AUTO: 272 X10*3/UL (ref 150–450)
POTASSIUM SERPL-SCNC: 4.7 MMOL/L (ref 3.5–5.3)
PROT SERPL-MCNC: 5.9 G/DL (ref 6.4–8.2)
RBC # BLD AUTO: 4.66 X10*6/UL (ref 4.5–5.9)
SODIUM SERPL-SCNC: 137 MMOL/L (ref 136–145)
WBC # BLD AUTO: 5.8 X10*3/UL (ref 4.4–11.3)

## 2023-12-01 PROCEDURE — 99239 HOSP IP/OBS DSCHRG MGMT >30: CPT | Performed by: INTERNAL MEDICINE

## 2023-12-01 PROCEDURE — 36415 COLL VENOUS BLD VENIPUNCTURE: CPT

## 2023-12-01 PROCEDURE — 85025 COMPLETE CBC W/AUTO DIFF WBC: CPT

## 2023-12-01 PROCEDURE — 83735 ASSAY OF MAGNESIUM: CPT

## 2023-12-01 PROCEDURE — 80053 COMPREHEN METABOLIC PANEL: CPT

## 2023-12-01 PROCEDURE — 82947 ASSAY GLUCOSE BLOOD QUANT: CPT

## 2023-12-01 PROCEDURE — 82248 BILIRUBIN DIRECT: CPT

## 2023-12-01 PROCEDURE — 2500000001 HC RX 250 WO HCPCS SELF ADMINISTERED DRUGS (ALT 637 FOR MEDICARE OP)

## 2023-12-01 RX ADMIN — INSULIN LISPRO 4 UNITS: 100 INJECTION, SOLUTION INTRAVENOUS; SUBCUTANEOUS at 14:33

## 2023-12-01 RX ADMIN — DEXTROAMPHETAMINE SACCHARATE, AMPHETAMINE ASPARTATE, DEXTROAMPHETAMINE SULFATE, AND AMPHETAMINE SULFATE 15 MG: 1.25; 1.25; 1.25; 1.25 TABLET ORAL at 08:52

## 2023-12-01 RX ADMIN — INSULIN LISPRO 6 UNITS: 100 INJECTION, SOLUTION INTRAVENOUS; SUBCUTANEOUS at 08:53

## 2023-12-01 RX ADMIN — BUPROPION HYDROCHLORIDE 300 MG: 300 TABLET, FILM COATED, EXTENDED RELEASE ORAL at 08:52

## 2023-12-01 RX ADMIN — LEVOTHYROXINE SODIUM 200 MCG: 0.2 TABLET ORAL at 07:00

## 2023-12-01 ASSESSMENT — PAIN SCALES - GENERAL: PAINLEVEL_OUTOF10: 0 - NO PAIN

## 2023-12-01 NOTE — DISCHARGE INSTRUCTIONS
Dear Mr. Alas,    It was a pleasure seeing you in the hospital. Below is a summary of what happened during your hospitalization and follow up plans:  You presented to [facility] emergency department as a transfer from [facility] due to yellowing of your eyes, pain, and CT images suggesting a block in the tube between your liver, gallbladder and intestine. The transfer is for ERCP procedure evaluation to remove the stone. Your pain was managed one the first day then improved one the next day. We ordered a CT of the chest because the pain was related to breathing and was concerned of a clot In your lungs; It showed no clot but did show a mass in the back of your chest near your esophagus. We did the procedure the next day and took biopsies of that mass, and biopsies of masses near your pancreas (lymph nodes), ok samples of an ulcer in your intestine and put a stent in the common bile duct (pipe between liver, gallbladder and intestine) since it was narrowed. We sent loads of labs to check for granulomatous diseases that many cause the lymph node findings, your CP and doctors will let you know about the results. Your labs improved and symptoms resolved and was discharged on 12/01/2023.     Things to Do:  - Will have a follow-up with Dr. Ramos in clinic at 1/09/2023.   - You will get a call about more biopsy results and will let you know if you need a repeat biopsy/procedure and will help you schedule the time and date.  - You will need a repeat ERCP procedure in 6 weeks, it is scheduled  - PLEASE schedule an appointment with your PCP in 1-2 weeks.   Number for  scheduling services is: [phone]    Thank you. Kind regards,  Jacklyn Hay  PGY-1 Internal Medicine

## 2023-12-01 NOTE — PROGRESS NOTES
Occupational Therapy                 Therapy Communication Note    Patient Name: Dk Alas  MRN: [id number]  Today's Date: 12/1/2023     Discipline: Occupational Therapy    Missed Visit Reason: Missed Visit Reason: Other (Comment) (OT orders received, OT entering room, declines any needs at this time, reports up independently. Will complete OT orders at this time, should pt have change in status will require new orders)    Missed Time: Attempt

## 2023-12-01 NOTE — DISCHARGE SUMMARY
Discharge Diagnosis  Obstructive jaundice    Issues Requiring Follow-Up  Look at the end of the hospital course    Test Results Pending At Discharge  Pending Labs       Order Current Status    Angiotensin converting enzyme In process    Blastomyces antibodies In process    Histoplasma antibodies In process    Histoplasma antigen, urine In process    Surgical Pathology Exam In process    T-Spot TB In process          Hospital Course  [facility] for concern of obstructive jaundice (on CT imaging + scleral icterus, abdominal pain, elevated LFTs and Total bili.) and ERCP evaluation. Patient had Chest pain worsening with deep inspiration and new D-dimer elvation, CT chest ordered and found no Pes but did find posterior mediastinal mass. EUS+ERCP+Biopsy done 11/30. Biopsy of mediastinal mass, duodenal ulcers, peripancreatic lymph nodes, brush biopsy of CBD,and narrowing of CBD and stent placed. EUS of pancrease was difficult to interpret, ordered MRI pancreas which showed calcified nodes around the pancreas. Fine cytological interpretation currently reads as necrotic material in mediastinum mass with histiocytes. Pancreatic acinar and lymphoid cells in peripancreatic FNA, with no malignant cells in CBD brush, and other 2 FNAs. Pending final path. LDH and Beta Hcg normal, sent whole granulomatous workup, so far inconclusive, pending other labs for it. Patient labs back to baseline, no abdominal pain since day 2, no nausea or vomiting, discharged on 12/1.    Follow up:  - Follow up appointment with Dr. Ramos in clinic at 1/09/2024  - Will get a call about biopsy final results, will let him know if he needs a repeat biopsy/procedure. EUS+biopsy vs thoracic surgery biopsy  - Will need a repeat ERCP procedure in 6 weeks   - PCP appointment in 1-2 weeks  - follow up granulomatous labs    Jacklyn Hay  PGY-1, Internal Medicine     Pertinent Physical Exam At Time of Discharge  Physical  Exam  General: awake, alert, conversant, appears stated age  HEENT: pupils equal and round, scleral icterus present, no conjunctivitis or conjunctiva; pallor   Skin: no suspect lesions or rashes noted on visible skin  Chest: ctab, normal respiratory effort, not on supplemental oxygen  Cardiac: regular rate and rhythm, normal s1, s2, no M/R/G, no JVD  Abdomen: soft, ND, no involuntary guarding, NT  : no flank pain or indwelling urinary catheter  EXT: no peripheral edema, no asymmetry noted  MSK: no focal joint swelling noted  Neuro: AOx4, moving all limbs spontaneously, follows commands  Psych: coherent thought process, appropriate mood and affect  Home Medications     Medication List      CONTINUE taking these medications     amphetamine-dextroamphetamine XR 15 mg 24 hr capsule; Commonly known as:   Adderall XR   atorvastatin 20 mg tablet; Commonly known as: Lipitor   buPROPion  mg 24 hr tablet; Commonly known as: Wellbutrin XL   busPIRone 5 mg tablet; Commonly known as: Buspar   cyanocobalamin 250 mcg tablet; Commonly known as: Vitamin B-12   dulaglutide 0.75 mg/0.5 mL pen injector; Commonly known as: Trulicity   FISH OIL ORAL   levothyroxine 200 mcg tablet; Commonly known as: Synthroid, Levoxyl   lisinopril 10 mg tablet   metFORMIN (OSM) 1,000 mg 24 hr tablet; Commonly known as: Fortamet       Outpatient Follow-Up  Future Appointments   Date Time Provider Department Center   1/9/2024  8:30 AM Nathalia Ramos MD [id number]       MD [facility] ATTENDING NOTE:  I assumed the role of Marylou GI attending on day of discharge. On the day of discharge I spent > 30 min seeing the patient, discussing with patient and team, reviewing results, reviewing medications, talking to the other specialists, arranging discharge plans, and completing paperwork.        Unclear etiology for posterior mediastinal mass with multiple perihepatic and peripancreatic lymph nodes - doubt infectious etiology;  concern for possible underlying lymphoma although he does not have weight loss, etc. MRI/MRCP suggestive of side-branch IPMN but not associated with these other findings. No pancreatic mass. PD normal. Multiple gallstones noted without cholecystitis. Discussed with patient re: repeat EUS-FNB (including samples for RPMI) and if still non-diagnostic, possible mediastinoscopy with thoracic surgery for tissue versus laparoscopy for tissue. Patient currently reports no abdominal pain. Tolerating regular diet. LFTs improving. He would like to go home with outpatient follow up. Pending final path review, plan to arrange for repeat EUS-FNB either closer to [facility] at [facility] or repeat here at [facility] depending on scheduling availability with repeat ERCP at later date for potential stent exchange.    Mike Daniels MD

## 2023-12-02 LAB
ACE SERPL-CCNC: 15 U/L (ref 16–85)
NIL(NEG) CONTROL SPOT COUNT: NORMAL
PANEL A SPOT COUNT: 0
PANEL B SPOT COUNT: 0
POS CONTROL SPOT COUNT: NORMAL
T-SPOT. TB INTERPRETATION: NEGATIVE

## 2023-12-04 LAB
H CAPSUL AB SER QL ID: NOT DETECTED
H CAPSUL MYC AB TITR SER CF: NORMAL {TITER}
H CAPSUL YST AB TITR SER CF: NORMAL {TITER}
LABORATORY COMMENT REPORT: NORMAL
LABORATORY COMMENT REPORT: NORMAL
PATH REPORT.ADDENDUM SPEC: NORMAL
PATH REPORT.FINAL DX SPEC: NORMAL
PATH REPORT.GROSS SPEC: NORMAL
PATH REPORT.INTRAOP OBS SPEC DOC: NORMAL
PATH REPORT.TOTAL CANCER: NORMAL

## 2023-12-06 LAB — B DERMAT AB TITR SER: NEGATIVE {TITER}

## 2023-12-08 LAB
LAB AP ASR DISCLAIMER: NORMAL
LABORATORY COMMENT REPORT: NORMAL
PATH REPORT.COMMENTS IMP SPEC: NORMAL
PATH REPORT.FINAL DX SPEC: NORMAL
PATH REPORT.GROSS SPEC: NORMAL
PATH REPORT.TOTAL CANCER: NORMAL

## 2023-12-11 ENCOUNTER — ANESTHESIA (OUTPATIENT)
Dept: GASTROENTEROLOGY | Facility: HOSPITAL | Age: 43
End: 2023-12-11
Payer: COMMERCIAL

## 2023-12-11 ENCOUNTER — ANESTHESIA EVENT (OUTPATIENT)
Dept: GASTROENTEROLOGY | Facility: HOSPITAL | Age: 43
End: 2023-12-11
Payer: COMMERCIAL

## 2023-12-11 ENCOUNTER — HOSPITAL ENCOUNTER (OUTPATIENT)
Dept: GASTROENTEROLOGY | Facility: HOSPITAL | Age: 43
Setting detail: OUTPATIENT SURGERY
Discharge: HOME | End: 2023-12-11
Payer: COMMERCIAL

## 2023-12-11 VITALS
DIASTOLIC BLOOD PRESSURE: 67 MMHG | SYSTOLIC BLOOD PRESSURE: 111 MMHG | TEMPERATURE: 96.8 F | BODY MASS INDEX: 34.79 KG/M2 | WEIGHT: 243 LBS | HEIGHT: 70 IN | RESPIRATION RATE: 18 BRPM | HEART RATE: 69 BPM | OXYGEN SATURATION: 98 %

## 2023-12-11 DIAGNOSIS — J98.59 MEDIASTINAL MASS: ICD-10-CM

## 2023-12-11 PROBLEM — E11.9 DIABETES MELLITUS, TYPE 2 (MULTI): Status: ACTIVE | Noted: 2023-12-11

## 2023-12-11 PROBLEM — E03.9 HYPOTHYROIDISM: Status: ACTIVE | Noted: 2023-12-11

## 2023-12-11 PROBLEM — D64.9 ANEMIA: Status: ACTIVE | Noted: 2023-12-11

## 2023-12-11 PROBLEM — F32.A DEPRESSION: Status: ACTIVE | Noted: 2023-12-11

## 2023-12-11 PROBLEM — F41.9 ANXIETY: Status: ACTIVE | Noted: 2023-12-11

## 2023-12-11 PROBLEM — F90.9 ADHD: Status: ACTIVE | Noted: 2023-12-11

## 2023-12-11 PROBLEM — I10 HTN (HYPERTENSION): Status: ACTIVE | Noted: 2023-12-11

## 2023-12-11 LAB — GLUCOSE BLD MANUAL STRIP-MCNC: 170 MG/DL (ref 74–99)

## 2023-12-11 PROCEDURE — 88189 FLOWCYTOMETRY/READ 16 & >: CPT | Performed by: PATHOLOGY

## 2023-12-11 PROCEDURE — 88312 SPECIAL STAINS GROUP 1: CPT | Performed by: PATHOLOGY

## 2023-12-11 PROCEDURE — 88341 IMHCHEM/IMCYTCHM EA ADD ANTB: CPT | Performed by: PATHOLOGY

## 2023-12-11 PROCEDURE — 7100000010 HC PHASE TWO TIME - EACH INCREMENTAL 1 MINUTE: Performed by: INTERNAL MEDICINE

## 2023-12-11 PROCEDURE — 2500000005 HC RX 250 GENERAL PHARMACY W/O HCPCS: Mod: SE

## 2023-12-11 PROCEDURE — 3700000001 HC GENERAL ANESTHESIA TIME - INITIAL BASE CHARGE: Performed by: INTERNAL MEDICINE

## 2023-12-11 PROCEDURE — A43242 PR EDG INTRMURAL NEEDLE ASPIR/BIOP ALTERED ANATOMY

## 2023-12-11 PROCEDURE — 43242 EGD US FINE NEEDLE BX/ASPIR: CPT | Performed by: INTERNAL MEDICINE

## 2023-12-11 PROCEDURE — 2500000004 HC RX 250 GENERAL PHARMACY W/ HCPCS (ALT 636 FOR OP/ED): Mod: SE

## 2023-12-11 PROCEDURE — 88185 FLOWCYTOMETRY/TC ADD-ON: CPT | Mod: TC | Performed by: INTERNAL MEDICINE

## 2023-12-11 PROCEDURE — 88342 IMHCHEM/IMCYTCHM 1ST ANTB: CPT | Performed by: PATHOLOGY

## 2023-12-11 PROCEDURE — 43238 EGD US FINE NEEDLE BX/ASPIR: CPT | Performed by: INTERNAL MEDICINE

## 2023-12-11 PROCEDURE — 88172 CYTP DX EVAL FNA 1ST EA SITE: CPT | Performed by: PATHOLOGY

## 2023-12-11 PROCEDURE — 2720000007 HC OR 272 NO HCPCS: Performed by: INTERNAL MEDICINE

## 2023-12-11 PROCEDURE — 88305 TISSUE EXAM BY PATHOLOGIST: CPT | Performed by: PATHOLOGY

## 2023-12-11 PROCEDURE — 3700000002 HC GENERAL ANESTHESIA TIME - EACH INCREMENTAL 1 MINUTE: Performed by: INTERNAL MEDICINE

## 2023-12-11 PROCEDURE — 82947 ASSAY GLUCOSE BLOOD QUANT: CPT

## 2023-12-11 PROCEDURE — 88341 IMHCHEM/IMCYTCHM EA ADD ANTB: CPT | Mod: TC,SUR | Performed by: STUDENT IN AN ORGANIZED HEALTH CARE EDUCATION/TRAINING PROGRAM

## 2023-12-11 PROCEDURE — 88313 SPECIAL STAINS GROUP 2: CPT | Performed by: PATHOLOGY

## 2023-12-11 PROCEDURE — 88305 TISSUE EXAM BY PATHOLOGIST: CPT | Mod: TC,MCY | Performed by: STUDENT IN AN ORGANIZED HEALTH CARE EDUCATION/TRAINING PROGRAM

## 2023-12-11 PROCEDURE — 7100000009 HC PHASE TWO TIME - INITIAL BASE CHARGE: Performed by: INTERNAL MEDICINE

## 2023-12-11 PROCEDURE — A43242 PR EDG INTRMURAL NEEDLE ASPIR/BIOP ALTERED ANATOMY: Performed by: PAIN MEDICINE

## 2023-12-11 PROCEDURE — 88173 CYTOPATH EVAL FNA REPORT: CPT | Performed by: PATHOLOGY

## 2023-12-11 RX ORDER — PIPERACILLIN SODIUM, TAZOBACTAM SODIUM 3; .375 G/15ML; G/15ML
INJECTION, POWDER, LYOPHILIZED, FOR SOLUTION INTRAVENOUS AS NEEDED
Status: DISCONTINUED | OUTPATIENT
Start: 2023-12-11 | End: 2023-12-11

## 2023-12-11 RX ORDER — ACETAMINOPHEN 325 MG/1
650 TABLET ORAL EVERY 8 HOURS PRN
Status: CANCELLED | OUTPATIENT
Start: 2023-12-11

## 2023-12-11 RX ORDER — ONDANSETRON HYDROCHLORIDE 2 MG/ML
INJECTION, SOLUTION INTRAVENOUS AS NEEDED
Status: DISCONTINUED | OUTPATIENT
Start: 2023-12-11 | End: 2023-12-11

## 2023-12-11 RX ORDER — PROPOFOL 10 MG/ML
INJECTION, EMULSION INTRAVENOUS AS NEEDED
Status: DISCONTINUED | OUTPATIENT
Start: 2023-12-11 | End: 2023-12-11

## 2023-12-11 RX ORDER — SODIUM CHLORIDE, SODIUM LACTATE, POTASSIUM CHLORIDE, CALCIUM CHLORIDE 600; 310; 30; 20 MG/100ML; MG/100ML; MG/100ML; MG/100ML
INJECTION, SOLUTION INTRAVENOUS CONTINUOUS PRN
Status: DISCONTINUED | OUTPATIENT
Start: 2023-12-11 | End: 2023-12-11

## 2023-12-11 RX ORDER — LIDOCAINE HCL/PF 100 MG/5ML
SYRINGE (ML) INTRAVENOUS AS NEEDED
Status: DISCONTINUED | OUTPATIENT
Start: 2023-12-11 | End: 2023-12-11

## 2023-12-11 RX ORDER — PROPOFOL 10 MG/ML
INJECTION, EMULSION INTRAVENOUS CONTINUOUS PRN
Status: DISCONTINUED | OUTPATIENT
Start: 2023-12-11 | End: 2023-12-11

## 2023-12-11 RX ORDER — ONDANSETRON HYDROCHLORIDE 2 MG/ML
4 INJECTION, SOLUTION INTRAVENOUS ONCE AS NEEDED
Status: CANCELLED | OUTPATIENT
Start: 2023-12-11

## 2023-12-11 RX ORDER — SODIUM CHLORIDE, SODIUM LACTATE, POTASSIUM CHLORIDE, CALCIUM CHLORIDE 600; 310; 30; 20 MG/100ML; MG/100ML; MG/100ML; MG/100ML
100 INJECTION, SOLUTION INTRAVENOUS CONTINUOUS
Status: CANCELLED | OUTPATIENT
Start: 2023-12-11

## 2023-12-11 RX ORDER — DIPHENHYDRAMINE HYDROCHLORIDE 50 MG/ML
12.5 INJECTION INTRAMUSCULAR; INTRAVENOUS ONCE AS NEEDED
Status: CANCELLED | OUTPATIENT
Start: 2023-12-11

## 2023-12-11 RX ORDER — MIDAZOLAM HYDROCHLORIDE 1 MG/ML
INJECTION INTRAMUSCULAR; INTRAVENOUS AS NEEDED
Status: DISCONTINUED | OUTPATIENT
Start: 2023-12-11 | End: 2023-12-11

## 2023-12-11 RX ORDER — ALBUTEROL SULFATE 0.83 MG/ML
2.5 SOLUTION RESPIRATORY (INHALATION) ONCE AS NEEDED
Status: CANCELLED | OUTPATIENT
Start: 2023-12-11

## 2023-12-11 RX ORDER — MEPERIDINE HYDROCHLORIDE 25 MG/ML
12.5 INJECTION INTRAMUSCULAR; INTRAVENOUS; SUBCUTANEOUS EVERY 10 MIN PRN
Status: CANCELLED | OUTPATIENT
Start: 2023-12-11

## 2023-12-11 RX ORDER — LABETALOL HYDROCHLORIDE 5 MG/ML
5 INJECTION, SOLUTION INTRAVENOUS EVERY 10 MIN PRN
Status: CANCELLED | OUTPATIENT
Start: 2023-12-11

## 2023-12-11 RX ADMIN — SODIUM CHLORIDE, POTASSIUM CHLORIDE, SODIUM LACTATE AND CALCIUM CHLORIDE: 600; 310; 30; 20 INJECTION, SOLUTION INTRAVENOUS at 08:00

## 2023-12-11 RX ADMIN — MIDAZOLAM HYDROCHLORIDE 2 MG: 1 INJECTION, SOLUTION INTRAMUSCULAR; INTRAVENOUS at 08:00

## 2023-12-11 RX ADMIN — PROPOFOL 200 MCG/KG/MIN: 10 INJECTION, EMULSION INTRAVENOUS at 08:10

## 2023-12-11 RX ADMIN — PROPOFOL 40 MG: 10 INJECTION, EMULSION INTRAVENOUS at 08:13

## 2023-12-11 RX ADMIN — PROPOFOL 30 MG: 10 INJECTION, EMULSION INTRAVENOUS at 08:59

## 2023-12-11 RX ADMIN — PIPERACILLIN SODIUM AND TAZOBACTAM SODIUM 3.38 G: 3; .375 INJECTION, POWDER, LYOPHILIZED, FOR SOLUTION INTRAVENOUS at 08:10

## 2023-12-11 RX ADMIN — PROPOFOL 60 MG: 10 INJECTION, EMULSION INTRAVENOUS at 08:50

## 2023-12-11 RX ADMIN — PROPOFOL 30 MG: 10 INJECTION, EMULSION INTRAVENOUS at 08:58

## 2023-12-11 RX ADMIN — ONDANSETRON 4 MG: 2 INJECTION INTRAMUSCULAR; INTRAVENOUS at 08:20

## 2023-12-11 RX ADMIN — DEXMEDETOMIDINE HYDROCHLORIDE 12 MCG: 100 INJECTION, SOLUTION INTRAVENOUS at 08:00

## 2023-12-11 RX ADMIN — PROPOFOL 50 MG: 10 INJECTION, EMULSION INTRAVENOUS at 08:10

## 2023-12-11 RX ADMIN — PROPOFOL 20 MG: 10 INJECTION, EMULSION INTRAVENOUS at 08:21

## 2023-12-11 RX ADMIN — LIDOCAINE HYDROCHLORIDE 100 MG: 20 INJECTION INTRAVENOUS at 08:10

## 2023-12-11 RX ADMIN — DEXMEDETOMIDINE HYDROCHLORIDE 8 MCG: 100 INJECTION, SOLUTION INTRAVENOUS at 08:36

## 2023-12-11 RX ADMIN — PROPOFOL 40 MG: 10 INJECTION, EMULSION INTRAVENOUS at 08:36

## 2023-12-11 ASSESSMENT — PAIN SCALES - GENERAL
PAINLEVEL_OUTOF10: 0 - NO PAIN
PAINLEVEL_OUTOF10: 4
PAINLEVEL_OUTOF10: 3
PAINLEVEL_OUTOF10: 0 - NO PAIN

## 2023-12-11 ASSESSMENT — PAIN - FUNCTIONAL ASSESSMENT
PAIN_FUNCTIONAL_ASSESSMENT: 0-10
PAIN_FUNCTIONAL_ASSESSMENT: UNABLE TO SELF-REPORT
PAIN_FUNCTIONAL_ASSESSMENT: 0-10

## 2023-12-11 ASSESSMENT — COLUMBIA-SUICIDE SEVERITY RATING SCALE - C-SSRS
1. IN THE PAST MONTH, HAVE YOU WISHED YOU WERE DEAD OR WISHED YOU COULD GO TO SLEEP AND NOT WAKE UP?: NO
2. HAVE YOU ACTUALLY HAD ANY THOUGHTS OF KILLING YOURSELF?: NO

## 2023-12-11 NOTE — ANESTHESIA PREPROCEDURE EVALUATION
Patient: Dk Alas    Procedure Information       Date/Time: 12/11/23 0730    Scheduled providers: Nathalia Ramos MD; Moraima Paulino RN; Cliff Sommers MD    Procedure: ENDOSCOPIC ULTRASOUND (UPPER)    Location: [facility]              Past Medical History:   Diagnosis Date    Motion sickness       Past Surgical History:   Procedure Laterality Date    CT ANGIO CORONARY ART WITH HEARTFLOW IF SCORE >30%  10/26/2021    CT ANGIO CORONARY ART WITH HEARTFLOW IF SCORE >30% 10/26/2021     Social History     Tobacco Use    Smoking status: Never    Smokeless tobacco: Never   Vaping Use    Vaping Use: Never used   Substance Use Topics    Alcohol use: Never    Drug use: Never      Current Outpatient Medications   Medication Instructions    amphetamine-dextroamphetamine XR (Adderall XR) 15 mg 24 hr capsule 15 mg, oral, Every morning, Do not crush or chew.    atorvastatin (LIPITOR) 20 mg, oral, Daily    buPROPion XL (WELLBUTRIN XL) 300 mg, oral, Daily, Do not crush, chew, or split.    busPIRone (Buspar) 5 mg tablet oral, 3 times daily PRN    cyanocobalamin (Vitamin B-12) 250 mcg tablet oral, Daily    docosahexaenoic acid/epa (FISH OIL ORAL) oral    dulaglutide (TRULICITY) 0.75 mg, subcutaneous, Weekly    levothyroxine (SYNTHROID, LEVOXYL) 200 mcg, oral, Daily before breakfast    lisinopril 10 mg, oral, Daily    metFORMIN (OSM) (FORTAMET) 1,000 mg, oral, Daily with evening meal, Do not crush, chew, or split.      Allergies   Allergen Reactions    Ciprofloxacin Hives and Other     Rapid heart rate, fainted    Nsaids (Non-Steroidal Anti-Inflammatory Drug) Hives        Chemistry    Lab Results   Component Value Date/Time     12/01/2023 0633    K 4.7 12/01/2023 0633     12/01/2023 0633    CO2 24 12/01/2023 0633    BUN 12 12/01/2023 0633    CREATININE 0.99 12/01/2023 0633    Lab Results   Component Value Date/Time    CALCIUM 8.5 (L) 12/01/2023 0633    ALKPHOS 272 (H) 12/01/2023 0633    AST 78 (H)  12/01/2023 0633     (H) 12/01/2023 0633    BILITOT 1.7 (H) 12/01/2023 0633          Lab Results   Component Value Date/Time    WBC 5.8 12/01/2023 0632    HGB 12.9 (L) 12/01/2023 0632    HCT 40.4 (L) 12/01/2023 0632     12/01/2023 0632     Lab Results   Component Value Date/Time    PROTIME 11.3 11/30/2023 0615    INR 1.0 11/30/2023 0615               Clinical information reviewed:   Tobacco  Allergies  Meds   Med Hx  Surg Hx   Fam Hx  Soc Hx        NPO Detail:  NPO/Void Status  Carbonhydrate Drink Given Prior to Surgery? : N  Date of Last Liquid: 12/11/23  Time of Last Liquid: 0500  Date of Last Solid: 12/10/23  Time of Last Solid: 0700  Last Intake Type: Clear fluids  Time of Last Void: 0710         Physical Exam    Airway  Mallampati: II  TM distance: >3 FB  Neck ROM: full     Cardiovascular   Rhythm: regular  Rate: normal  Comments: Occasional ecotopy on exam   Dental    Pulmonary   Breath sounds clear to auscultation     Abdominal            Anesthesia Plan    ASA 2     general and MAC     intravenous induction   Anesthetic plan and risks discussed with patient.    Plan discussed with CRNA.

## 2023-12-11 NOTE — ANESTHESIA PROCEDURE NOTES
Peripheral IV  Date/Time: 12/11/2023 8:48 AM  Inserted by: AUNDREA Romeo-KASEY    Placement  Needle size: 20 G  Laterality: left  Location: hand  Local anesthetic: none  Site prep: chlorhexidine  Technique: anatomical landmarks  Attempts: 1

## 2023-12-11 NOTE — ANESTHESIA POSTPROCEDURE EVALUATION
Patient: Dk Alas    Procedure Summary       Date: 12/11/23 Room / Location: [facility]    Anesthesia Start: 0803 Anesthesia Stop: 0938    Procedure: ENDOSCOPIC ULTRASOUND (UPPER) Diagnosis: Mediastinal mass    Scheduled Providers: Nathalia Ramos MD; Moraima Paulino RN; Cliff Sommers MD Responsible Provider: Cliff Sommers MD    Anesthesia Type: MAC ASA Status: 2            Anesthesia Type: MAC    Vitals Value Taken Time   /83 12/11/23 0935   Temp 36 °C (96.8 °F) 12/11/23 0935   Pulse 73 12/11/23 0935   Resp 16 12/11/23 0935   SpO2 100 % 12/11/23 0935       Anesthesia Post Evaluation    Patient participation: complete - patient participated  Level of consciousness: awake  Pain management: adequate  Airway patency: patent  Cardiovascular status: acceptable  Respiratory status: acceptable  Hydration status: acceptable  Postoperative Nausea and Vomiting: none        There were no known notable events for this encounter.    
normal...

## 2023-12-13 DIAGNOSIS — J98.59 MEDIASTINAL MASS: Primary | ICD-10-CM

## 2023-12-14 LAB
CELL COUNT (BLOOD): 1.26 X10*3/UL
CELL POPULATIONS: NORMAL
DIAGNOSIS: NORMAL
FLOW DIFFERENTIAL: NORMAL
FLOW TEST ORDERED: NORMAL
LAB TEST METHOD: NORMAL
NUMBER OF CELLS COLLECTED: NORMAL PER TUBE
PATH REPORT.TOTAL CANCER: NORMAL
SIGNATURE COMMENT: NORMAL
SPECIMEN VIABILITY: NORMAL

## 2023-12-14 NOTE — DOCUMENTATION CLARIFICATION NOTE
"    PATIENT:               SOPHIE LANDEROS  ACCT #:                  [id number]  MRN:                       [id number]  :                       1980  ADMIT DATE:       2023 10:02 AM  DISCH DATE:        2023 2:56 PM  RESPONDING PROVIDER #:        [id number]          PROVIDER RESPONSE TEXT:    ERCP was performed by Dr. Nathalia Ramos. It revealed a CBD stricture likely secondary to extrinsic compression of perihepatic lymph node.    CDI QUERY TEXT:    _Operative Report Missing    Instruction:    Based on your assessment of the patient and the clinical information, please provide the requested documentation by clicking on the appropriate radio button and enter any additional information if prompted.    Question: No procedure note  is currently available for the ERCP on 23    When answering this query, please exercise your independent professional judgment. The fact that a question is being asked, does not imply that any particular answer is desired or expected.    The patient's clinical indicators include:  Clinical Information: \" 43 year old male with a PMHx of T2DM Metformin 1000mg nightly and dulaglutide 3mg Weekly, HTN, HLD, ADHD Aderall 15mg OD, Anxiety and depression on bupropion who presents as a transfer from [facility]. \" Copied from the History and Physical 23    This query refers to the ERCP performed on 23    Clinical Indicators: Midsternal chest pain, vomiting, dark urine, \"  LFTs showed Total bili 4.7/ Direct Bili 2.0 /  /  / . Lipase 31. Cr 0.98 \" History and Physical 23, Vitals 36.6, 65, 20, 152/88, \" MRCP vs ERCP to fully r/o choledocholithiasis and possible surgical intervention for cholecystectomy if needed. \"    Treatment: CT A/P, Lab monitoring, NPO, Pain Control  Options provided:  -- Operative report has already been dictated  -- Operative report will be completed on, Please specify additional information " below  -- Other - I will add my own diagnosis  -- Refer to Clinical Documentation Reviewer    Query created by: Beatriz Sharp on 12/13/2023 6:29 PM      Electronically signed by:  KHURRAM JENKINS MD 12/14/2023 8:12 AM

## 2023-12-18 ENCOUNTER — APPOINTMENT (OUTPATIENT)
Dept: GASTROENTEROLOGY | Facility: CLINIC | Age: 43
End: 2023-12-18
Payer: COMMERCIAL

## 2023-12-21 LAB
LABORATORY COMMENT REPORT: NORMAL
LABORATORY COMMENT REPORT: NORMAL
PATH REPORT.FINAL DX SPEC: NORMAL
PATH REPORT.GROSS SPEC: NORMAL
PATH REPORT.INTRAOP OBS SPEC DOC: NORMAL
PATH REPORT.TOTAL CANCER: NORMAL

## 2023-12-22 DIAGNOSIS — J98.59 MEDIASTINAL MASS: Primary | ICD-10-CM

## 2023-12-22 NOTE — PROGRESS NOTES
Called patient over phone to discuss largely nondiagnostic EUS biopsy results. Shows granulomas again. Surgical pathology still pending but he reports a 12lb weight loss. Will refer to thoracic surgery, infectious disease, and hematology/oncology for assistance with further work up    Patient will call to make appointment with PCP as well.    He has repeat ERCP 1/9/2023 with Dr. Ramos for biliary stent replacement/removal

## 2023-12-26 LAB
LAB AP ASR DISCLAIMER: NORMAL
LABORATORY COMMENT REPORT: NORMAL
PATH REPORT.FINAL DX SPEC: NORMAL
PATH REPORT.GROSS SPEC: NORMAL
PATH REPORT.TOTAL CANCER: NORMAL

## 2024-01-04 ENCOUNTER — OFFICE VISIT (OUTPATIENT)
Dept: SURGERY | Facility: CLINIC | Age: 44
End: 2024-01-04
Payer: COMMERCIAL

## 2024-01-04 ENCOUNTER — LAB (OUTPATIENT)
Dept: LAB | Facility: HOSPITAL | Age: 44
End: 2024-01-04
Payer: COMMERCIAL

## 2024-01-04 VITALS
HEIGHT: 70 IN | HEART RATE: 75 BPM | TEMPERATURE: 97.8 F | WEIGHT: 230 LBS | BODY MASS INDEX: 32.93 KG/M2 | OXYGEN SATURATION: 98 % | DIASTOLIC BLOOD PRESSURE: 76 MMHG | SYSTOLIC BLOOD PRESSURE: 132 MMHG

## 2024-01-04 DIAGNOSIS — J98.59 MEDIASTINAL MASS: Primary | ICD-10-CM

## 2024-01-04 DIAGNOSIS — J98.59 MEDIASTINAL MASS: ICD-10-CM

## 2024-01-04 LAB
ALBUMIN SERPL BCP-MCNC: 4.1 G/DL (ref 3.4–5)
ALP SERPL-CCNC: 117 U/L (ref 33–120)
ALT SERPL W P-5'-P-CCNC: 30 U/L (ref 10–52)
ANION GAP SERPL CALC-SCNC: 12 MMOL/L (ref 10–20)
AST SERPL W P-5'-P-CCNC: 15 U/L (ref 9–39)
BILIRUB SERPL-MCNC: 1.1 MG/DL (ref 0–1.2)
BUN SERPL-MCNC: 10 MG/DL (ref 6–23)
CALCIUM SERPL-MCNC: 9.1 MG/DL (ref 8.6–10.3)
CHLORIDE SERPL-SCNC: 103 MMOL/L (ref 98–107)
CO2 SERPL-SCNC: 28 MMOL/L (ref 21–32)
CREAT SERPL-MCNC: 1.13 MG/DL (ref 0.5–1.3)
ERYTHROCYTE [DISTWIDTH] IN BLOOD BY AUTOMATED COUNT: 12.6 % (ref 11.5–14.5)
GFR SERPL CREATININE-BSD FRML MDRD: 82 ML/MIN/1.73M*2
GLUCOSE SERPL-MCNC: 155 MG/DL (ref 74–99)
HCT VFR BLD AUTO: 39 % (ref 41–52)
HGB BLD-MCNC: 12.7 G/DL (ref 13.5–17.5)
INR PPP: 1.1 (ref 0.9–1.1)
MCH RBC QN AUTO: 27.2 PG (ref 26–34)
MCHC RBC AUTO-ENTMCNC: 32.6 G/DL (ref 32–36)
MCV RBC AUTO: 84 FL (ref 80–100)
NRBC BLD-RTO: 0 /100 WBCS (ref 0–0)
PLATELET # BLD AUTO: 341 X10*3/UL (ref 150–450)
POTASSIUM SERPL-SCNC: 4.6 MMOL/L (ref 3.5–5.3)
PROT SERPL-MCNC: 6.6 G/DL (ref 6.4–8.2)
PROTHROMBIN TIME: 12.1 SECONDS (ref 9.8–12.8)
RBC # BLD AUTO: 4.67 X10*6/UL (ref 4.5–5.9)
SODIUM SERPL-SCNC: 138 MMOL/L (ref 136–145)
WBC # BLD AUTO: 7.8 X10*3/UL (ref 4.4–11.3)

## 2024-01-04 PROCEDURE — 36415 COLL VENOUS BLD VENIPUNCTURE: CPT

## 2024-01-04 PROCEDURE — 4010F ACE/ARB THERAPY RXD/TAKEN: CPT | Performed by: STUDENT IN AN ORGANIZED HEALTH CARE EDUCATION/TRAINING PROGRAM

## 2024-01-04 PROCEDURE — 85610 PROTHROMBIN TIME: CPT

## 2024-01-04 PROCEDURE — 82384 ASSAY THREE CATECHOLAMINES: CPT

## 2024-01-04 PROCEDURE — 3078F DIAST BP <80 MM HG: CPT | Performed by: STUDENT IN AN ORGANIZED HEALTH CARE EDUCATION/TRAINING PROGRAM

## 2024-01-04 PROCEDURE — 99205 OFFICE O/P NEW HI 60 MIN: CPT | Performed by: STUDENT IN AN ORGANIZED HEALTH CARE EDUCATION/TRAINING PROGRAM

## 2024-01-04 PROCEDURE — 85027 COMPLETE CBC AUTOMATED: CPT

## 2024-01-04 PROCEDURE — 99215 OFFICE O/P EST HI 40 MIN: CPT | Mod: 57 | Performed by: STUDENT IN AN ORGANIZED HEALTH CARE EDUCATION/TRAINING PROGRAM

## 2024-01-04 PROCEDURE — 1036F TOBACCO NON-USER: CPT | Performed by: STUDENT IN AN ORGANIZED HEALTH CARE EDUCATION/TRAINING PROGRAM

## 2024-01-04 PROCEDURE — 84075 ASSAY ALKALINE PHOSPHATASE: CPT

## 2024-01-04 PROCEDURE — 3075F SYST BP GE 130 - 139MM HG: CPT | Performed by: STUDENT IN AN ORGANIZED HEALTH CARE EDUCATION/TRAINING PROGRAM

## 2024-01-04 RX ORDER — TIRZEPATIDE 10 MG/.5ML
10 INJECTION, SOLUTION SUBCUTANEOUS
COMMUNITY

## 2024-01-04 NOTE — PROGRESS NOTES
HPI:   Dk Alas is a 43 y.o. male with a pmhx of DM2, HTN, and hypothyroidism who is referred to me by Dr Ramos for evaluation and treatment of posterior mediastinal mass. This was identified incidentally during workup for chest pain about one month ago. He developed intense retrosternal pain and went to ED. He was found to have obstructive jaundice requiring a CBD stent placed. During the workup, he was found to have an posterior mediastinal mass. This was biopsied twice by EUS but non-diagnostic in the end. He is otherwise doing well today. He is pain free. He denied MI or stroke. He denied SOB, WANG, fever or chills.     PMHx: per HPI  PSHx: ERCP, EUS  SHx: former smoker (20ppy), deny ETOH, or illicit drugs   FMHx: mom has heart attack    Current Outpatient Medications:     amphetamine-dextroamphetamine XR (Adderall XR) 15 mg 24 hr capsule, Take 1 capsule (15 mg) by mouth once daily in the morning. Do not crush or chew., Disp: , Rfl:     atorvastatin (Lipitor) 20 mg tablet, Take 1 tablet (20 mg) by mouth once daily., Disp: , Rfl:     buPROPion XL (Wellbutrin XL) 300 mg 24 hr tablet, Take 1 tablet (300 mg) by mouth once daily. Do not crush, chew, or split., Disp: , Rfl:     busPIRone (Buspar) 5 mg tablet, Take by mouth 3 times a day as needed., Disp: , Rfl:     cyanocobalamin (Vitamin B-12) 250 mcg tablet, Take by mouth once daily., Disp: , Rfl:     docosahexaenoic acid/epa (FISH OIL ORAL), Take by mouth., Disp: , Rfl:     dulaglutide (Trulicity) 0.75 mg/0.5 mL pen injector, Inject 0.75 mg under the skin 1 (one) time per week., Disp: , Rfl:     levothyroxine (Synthroid, Levoxyl) 200 mcg tablet, Take 1 tablet (200 mcg) by mouth once daily in the morning. Take before meals., Disp: , Rfl:     lisinopril 10 mg tablet, Take 1 tablet (10 mg) by mouth once daily., Disp: , Rfl:     metFORMIN, OSM, (Fortamet) 1,000 mg 24 hr tablet, Take 1 tablet (1,000 mg) by mouth once daily in the evening. Take with meals. Do not  "crush, chew, or split., Disp: , Rfl:    Allergies   Allergen Reactions    Ciprofloxacin Hives and Other     Rapid heart rate, fainted    Nsaids (Non-Steroidal Anti-Inflammatory Drug) Hives      No lab exists for component: \"<CBC>\", \"<CMP>\", \"<INR>\"       ROS  General: negative for fever, chills, weight loss, night sweat  Head: negative for severe headache, vision change, blurred vision,   CV: negative for chest pain, dizziness, lightheadedness   Pulm: negative for shortness of breath, dyspnea on exertion, hemoptysis  GI: negative for diarrhea, constipation, abdominal pain, nausea or vomiting, BRBPR  : negative for dysuria, hematuria, incontinence  Skin: negative for rash  Heme: negative for blood thinner, bleeding disorder or clotting disorder  Endo: negative for heat or cold intolerance, weight gain or weight loss  MSK: negative for rash, edema, weakness    PHYSICAL EXAM  Constitution: well-developed well-nourished male sitting in chair in no acute distress  HEENT: NCAT, moist mucosal membrane, neck supple, no crepitus, sclera anicteric  Lymph nodes: no cervical or supraclavicular lymphadenopathy  Cardiac: RRR, normal S1, S2, no mrg  Pulmonary: normal air movement, CTAP, no wcr  Abdomen: soft, non-distended, non-tender, no rigidity, guarding or rebound tenderness, no splenohepatomegaly  Neuro: AOx3, CNII-XII grossly normal  Ext: warm, dry, no edema noted. RUE in a sling with limited range of motion  Skin: dry, clean and intact  Psych: mood and affect wnl    Assessment and Plan:  This is a 43 y.o. male former smoker with incidental mediastinal mass.  I reviewed the CT scan from 11/27/23. It showed paraesophageal mass about 2.9 cm.  It positioned in the right chest mid esophagus.  It caused mass effect to the esophagus.  I reviewed Dr Ramos's EUS on 12/11/23.  There is a single round hypoechoic mass near the esophagus but not clearly originating from the esophagus.  Biopsy was taken and final pathology showed " fragments of necrotic or hyalinized debris, soft tissue and lymph lymphoid tissue.  No carcinoma identified.  I reviewed his MRI of abdomen on 11/30/23.  The mass identified in the mid esophagus.  It does not appear to connected to esophagus.  There is a soft tissue plane between these 2 structures.    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 I will send for preop labs and serum catecolamines to r/o pheochromocytoma. Then I will book him for surgery for mediastinal mass resection via a minimal invasive approach on 1/30.     I had a candid discussion with the patient. I discussed the risk of the surgery including bleeding, infection, airleak and postop pain. The alternative is serial imaging. The patient consented to proceed with surgery.     Tyrone Benitez MD  Thoracic Surgeon  [facility]   of Medicine  [facility]  Office phone: [phone]  Fax: [phone]  Pager: [phone]

## 2024-01-04 NOTE — LETTER
January 4, 2024     Nathalia Ramos MD  [id number]  Department Of Medicine-Gastroenterology  [facility] [address]    Patient: Dk Alas   YOB: 1980   Date of Visit: 1/4/2024       Dear Dr. Nathalia Ramos MD:    Thank you for referring Dk Alas to me for evaluation. Below are my notes for this consultation.  If you have questions, please do not hesitate to call me. I look forward to following your patient along with you. Thank you for involving me in the care of this patient.       Sincerely,     Tyrone Benitez MD      CC: No Recipients  ______________________________________________________________________________________    HPI:   Dk Alas is a 43 y.o. male with a pmhx of DM2, HTN, and hypothyroidism who is referred to me by Dr Ramos for evaluation and treatment of posterior mediastinal mass. This was identified incidentally during workup for chest pain about one month ago. He developed intense retrosternal pain and went to ED. He was found to have obstructive jaundice requiring a CBD stent placed. During the workup, he was found to have an posterior mediastinal mass. This was biopsied twice by EUS but non-diagnostic in the end. He is otherwise doing well today. He is pain free. He denied MI or stroke. He denied SOB, WANG, fever or chills.     PMHx: per HPI  PSHx: ERCP, EUS  SHx: former smoker (20ppy), deny ETOH, or illicit drugs   FMHx: mom has heart attack    Current Outpatient Medications:   •  amphetamine-dextroamphetamine XR (Adderall XR) 15 mg 24 hr capsule, Take 1 capsule (15 mg) by mouth once daily in the morning. Do not crush or chew., Disp: , Rfl:   •  atorvastatin (Lipitor) 20 mg tablet, Take 1 tablet (20 mg) by mouth once daily., Disp: , Rfl:   •  buPROPion XL (Wellbutrin XL) 300 mg 24 hr tablet, Take 1 tablet (300 mg) by mouth once daily. Do not crush, chew, or split., Disp: , Rfl:   •  busPIRone (Buspar) 5 mg tablet, Take by mouth 3 times a day as needed., Disp: , Rfl:  "  •  cyanocobalamin (Vitamin B-12) 250 mcg tablet, Take by mouth once daily., Disp: , Rfl:   •  docosahexaenoic acid/epa (FISH OIL ORAL), Take by mouth., Disp: , Rfl:   •  dulaglutide (Trulicity) 0.75 mg/0.5 mL pen injector, Inject 0.75 mg under the skin 1 (one) time per week., Disp: , Rfl:   •  levothyroxine (Synthroid, Levoxyl) 200 mcg tablet, Take 1 tablet (200 mcg) by mouth once daily in the morning. Take before meals., Disp: , Rfl:   •  lisinopril 10 mg tablet, Take 1 tablet (10 mg) by mouth once daily., Disp: , Rfl:   •  metFORMIN, OSM, (Fortamet) 1,000 mg 24 hr tablet, Take 1 tablet (1,000 mg) by mouth once daily in the evening. Take with meals. Do not crush, chew, or split., Disp: , Rfl:    Allergies   Allergen Reactions   • Ciprofloxacin Hives and Other     Rapid heart rate, fainted   • Nsaids (Non-Steroidal Anti-Inflammatory Drug) Hives      No lab exists for component: \"<CBC>\", \"<CMP>\", \"<INR>\"       ROS  General: negative for fever, chills, weight loss, night sweat  Head: negative for severe headache, vision change, blurred vision,   CV: negative for chest pain, dizziness, lightheadedness   Pulm: negative for shortness of breath, dyspnea on exertion, hemoptysis  GI: negative for diarrhea, constipation, abdominal pain, nausea or vomiting, BRBPR  : negative for dysuria, hematuria, incontinence  Skin: negative for rash  Heme: negative for blood thinner, bleeding disorder or clotting disorder  Endo: negative for heat or cold intolerance, weight gain or weight loss  MSK: negative for rash, edema, weakness    PHYSICAL EXAM  Constitution: well-developed well-nourished male sitting in chair in no acute distress  HEENT: NCAT, moist mucosal membrane, neck supple, no crepitus, sclera anicteric  Lymph nodes: no cervical or supraclavicular lymphadenopathy  Cardiac: RRR, normal S1, S2, no mrg  Pulmonary: normal air movement, CTAP, no wcr  Abdomen: soft, non-distended, non-tender, no rigidity, guarding or rebound " tenderness, no splenohepatomegaly  Neuro: AOx3, CNII-XII grossly normal  Ext: warm, dry, no edema noted. RUE in a sling with limited range of motion  Skin: dry, clean and intact  Psych: mood and affect wnl    Assessment and Plan:  This is a 43 y.o. male former smoker with incidental mediastinal mass.  I reviewed the CT scan from 11/27/23. It showed paraesophageal mass about 2.9 cm.  It positioned in the right chest mid esophagus.  It caused mass effect to the esophagus.  I reviewed Dr Ramos's EUS on 12/11/23.  There is a single round hypoechoic mass near the esophagus but not clearly originating from the esophagus.  Biopsy was taken and final pathology showed fragments of necrotic or hyalinized debris, soft tissue and lymph lymphoid tissue.  No carcinoma identified.  I reviewed his MRI of abdomen on 11/30/23.  The mass identified in the mid esophagus.  It does not appear to connected to esophagus.  There is a soft tissue plane between these 2 structures.    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 I will send for preop labs and serum catecolamines to r/o pheochromocytoma. Then I will book him for surgery for mediastinal mass resection via a minimal invasive approach on 1/30.     I had a candid discussion with the patient. I discussed the risk of the surgery including bleeding, infection, airleak and postop pain. The alternative is serial imaging. The patient consented to proceed with surgery.     Tyrone Benitez MD  Thoracic Surgeon  [facility]   of Medicine  [facility]  Office phone: [phone]  Fax: [phone]  Pager: [phone]

## 2024-01-04 NOTE — H&P (VIEW-ONLY)
HPI:   Dk Alas is a 43 y.o. male with a pmhx of DM2, HTN, and hypothyroidism who is referred to me by Dr Ramos for evaluation and treatment of posterior mediastinal mass. This was identified incidentally during workup for chest pain about one month ago. He developed intense retrosternal pain and went to ED. He was found to have obstructive jaundice requiring a CBD stent placed. During the workup, he was found to have an posterior mediastinal mass. This was biopsied twice by EUS but non-diagnostic in the end. He is otherwise doing well today. He is pain free. He denied MI or stroke. He denied SOB, WANG, fever or chills.     PMHx: per HPI  PSHx: ERCP, EUS  SHx: former smoker (20ppy), deny ETOH, or illicit drugs   FMHx: mom has heart attack    Current Outpatient Medications:     amphetamine-dextroamphetamine XR (Adderall XR) 15 mg 24 hr capsule, Take 1 capsule (15 mg) by mouth once daily in the morning. Do not crush or chew., Disp: , Rfl:     atorvastatin (Lipitor) 20 mg tablet, Take 1 tablet (20 mg) by mouth once daily., Disp: , Rfl:     buPROPion XL (Wellbutrin XL) 300 mg 24 hr tablet, Take 1 tablet (300 mg) by mouth once daily. Do not crush, chew, or split., Disp: , Rfl:     busPIRone (Buspar) 5 mg tablet, Take by mouth 3 times a day as needed., Disp: , Rfl:     cyanocobalamin (Vitamin B-12) 250 mcg tablet, Take by mouth once daily., Disp: , Rfl:     docosahexaenoic acid/epa (FISH OIL ORAL), Take by mouth., Disp: , Rfl:     dulaglutide (Trulicity) 0.75 mg/0.5 mL pen injector, Inject 0.75 mg under the skin 1 (one) time per week., Disp: , Rfl:     levothyroxine (Synthroid, Levoxyl) 200 mcg tablet, Take 1 tablet (200 mcg) by mouth once daily in the morning. Take before meals., Disp: , Rfl:     lisinopril 10 mg tablet, Take 1 tablet (10 mg) by mouth once daily., Disp: , Rfl:     metFORMIN, OSM, (Fortamet) 1,000 mg 24 hr tablet, Take 1 tablet (1,000 mg) by mouth once daily in the evening. Take with meals. Do not  "crush, chew, or split., Disp: , Rfl:    Allergies   Allergen Reactions    Ciprofloxacin Hives and Other     Rapid heart rate, fainted    Nsaids (Non-Steroidal Anti-Inflammatory Drug) Hives      No lab exists for component: \"<CBC>\", \"<CMP>\", \"<INR>\"       ROS  General: negative for fever, chills, weight loss, night sweat  Head: negative for severe headache, vision change, blurred vision,   CV: negative for chest pain, dizziness, lightheadedness   Pulm: negative for shortness of breath, dyspnea on exertion, hemoptysis  GI: negative for diarrhea, constipation, abdominal pain, nausea or vomiting, BRBPR  : negative for dysuria, hematuria, incontinence  Skin: negative for rash  Heme: negative for blood thinner, bleeding disorder or clotting disorder  Endo: negative for heat or cold intolerance, weight gain or weight loss  MSK: negative for rash, edema, weakness    PHYSICAL EXAM  Constitution: well-developed well-nourished male sitting in chair in no acute distress  HEENT: NCAT, moist mucosal membrane, neck supple, no crepitus, sclera anicteric  Lymph nodes: no cervical or supraclavicular lymphadenopathy  Cardiac: RRR, normal S1, S2, no mrg  Pulmonary: normal air movement, CTAP, no wcr  Abdomen: soft, non-distended, non-tender, no rigidity, guarding or rebound tenderness, no splenohepatomegaly  Neuro: AOx3, CNII-XII grossly normal  Ext: warm, dry, no edema noted. RUE in a sling with limited range of motion  Skin: dry, clean and intact  Psych: mood and affect wnl    Assessment and Plan:  This is a 43 y.o. male former smoker with incidental mediastinal mass.  I reviewed the CT scan from 11/27/23. It showed paraesophageal mass about 2.9 cm.  It positioned in the right chest mid esophagus.  It caused mass effect to the esophagus.  I reviewed Dr Ramos's EUS on 12/11/23.  There is a single round hypoechoic mass near the esophagus but not clearly originating from the esophagus.  Biopsy was taken and final pathology showed " fragments of necrotic or hyalinized debris, soft tissue and lymph lymphoid tissue.  No carcinoma identified.  I reviewed his MRI of abdomen on 11/30/23.  The mass identified in the mid esophagus.  It does not appear to connected to esophagus.  There is a soft tissue plane between these 2 structures.    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 I will send for preop labs and serum catecolamines to r/o pheochromocytoma. Then I will book him for surgery for mediastinal mass resection via a minimal invasive approach on 1/30.     I had a candid discussion with the patient. I discussed the risk of the surgery including bleeding, infection, airleak and postop pain. The alternative is serial imaging. The patient consented to proceed with surgery.     Tyrone Benitez MD  Thoracic Surgeon  [facility]  [facility]  Office phone: [phone]  Fax: [phone]  Pager: [phone]

## 2024-01-09 ENCOUNTER — ANESTHESIA EVENT (OUTPATIENT)
Dept: GASTROENTEROLOGY | Facility: HOSPITAL | Age: 44
End: 2024-01-09
Payer: COMMERCIAL

## 2024-01-09 ENCOUNTER — APPOINTMENT (OUTPATIENT)
Dept: PREADMISSION TESTING | Facility: HOSPITAL | Age: 44
End: 2024-01-09
Payer: COMMERCIAL

## 2024-01-09 ENCOUNTER — HOSPITAL ENCOUNTER (OUTPATIENT)
Dept: GASTROENTEROLOGY | Facility: HOSPITAL | Age: 44
Discharge: HOME | End: 2024-01-09
Payer: COMMERCIAL

## 2024-01-09 ENCOUNTER — ANESTHESIA (OUTPATIENT)
Dept: GASTROENTEROLOGY | Facility: HOSPITAL | Age: 44
End: 2024-01-09
Payer: COMMERCIAL

## 2024-01-09 VITALS
TEMPERATURE: 97.6 F | DIASTOLIC BLOOD PRESSURE: 78 MMHG | OXYGEN SATURATION: 97 % | SYSTOLIC BLOOD PRESSURE: 132 MMHG | HEART RATE: 72 BPM | RESPIRATION RATE: 16 BRPM

## 2024-01-09 DIAGNOSIS — K83.1 OBSTRUCTIVE JAUNDICE (CMS-HCC): ICD-10-CM

## 2024-01-09 DIAGNOSIS — J98.59 MEDIASTINAL MASS: ICD-10-CM

## 2024-01-09 LAB
DOPAMINE SERPL-MCNC: 61 PG/ML (ref 0–48)
EPINEPH PLAS-MCNC: 17 PG/ML (ref 0–62)
GLUCOSE BLD MANUAL STRIP-MCNC: 162 MG/DL (ref 74–99)
NOREPINEPH PLAS-MCNC: 625 PG/ML (ref 0–874)

## 2024-01-09 PROCEDURE — 74328 X-RAY BILE DUCT ENDOSCOPY: CPT | Performed by: INTERNAL MEDICINE

## 2024-01-09 PROCEDURE — 82947 ASSAY GLUCOSE BLOOD QUANT: CPT

## 2024-01-09 PROCEDURE — 7100000010 HC PHASE TWO TIME - EACH INCREMENTAL 1 MINUTE: Performed by: INTERNAL MEDICINE

## 2024-01-09 PROCEDURE — 2720000007 HC OR 272 NO HCPCS: Performed by: INTERNAL MEDICINE

## 2024-01-09 PROCEDURE — 7100000001 HC RECOVERY ROOM TIME - INITIAL BASE CHARGE: Performed by: INTERNAL MEDICINE

## 2024-01-09 PROCEDURE — 3700000001 HC GENERAL ANESTHESIA TIME - INITIAL BASE CHARGE: Performed by: INTERNAL MEDICINE

## 2024-01-09 PROCEDURE — 43275 ERCP REMOVE FORGN BODY DUCT: CPT | Performed by: INTERNAL MEDICINE

## 2024-01-09 PROCEDURE — 2500000001 HC RX 250 WO HCPCS SELF ADMINISTERED DRUGS (ALT 637 FOR MEDICARE OP): Mod: SE | Performed by: INTERNAL MEDICINE

## 2024-01-09 PROCEDURE — 2500000005 HC RX 250 GENERAL PHARMACY W/O HCPCS: Mod: SE | Performed by: NURSE ANESTHETIST, CERTIFIED REGISTERED

## 2024-01-09 PROCEDURE — 7100000002 HC RECOVERY ROOM TIME - EACH INCREMENTAL 1 MINUTE: Performed by: INTERNAL MEDICINE

## 2024-01-09 PROCEDURE — 2500000004 HC RX 250 GENERAL PHARMACY W/ HCPCS (ALT 636 FOR OP/ED): Mod: SE | Performed by: NURSE ANESTHETIST, CERTIFIED REGISTERED

## 2024-01-09 PROCEDURE — C1769 GUIDE WIRE: HCPCS | Performed by: INTERNAL MEDICINE

## 2024-01-09 PROCEDURE — 7100000009 HC PHASE TWO TIME - INITIAL BASE CHARGE: Performed by: INTERNAL MEDICINE

## 2024-01-09 PROCEDURE — 3700000002 HC GENERAL ANESTHESIA TIME - EACH INCREMENTAL 1 MINUTE: Performed by: INTERNAL MEDICINE

## 2024-01-09 RX ORDER — OXYCODONE AND ACETAMINOPHEN 5; 325 MG/1; MG/1
1 TABLET ORAL EVERY 4 HOURS PRN
Status: CANCELLED | OUTPATIENT
Start: 2024-01-09

## 2024-01-09 RX ORDER — ONDANSETRON HYDROCHLORIDE 2 MG/ML
INJECTION, SOLUTION INTRAVENOUS AS NEEDED
Status: DISCONTINUED | OUTPATIENT
Start: 2024-01-09 | End: 2024-01-09

## 2024-01-09 RX ORDER — MIDAZOLAM HYDROCHLORIDE 1 MG/ML
INJECTION, SOLUTION INTRAMUSCULAR; INTRAVENOUS AS NEEDED
Status: DISCONTINUED | OUTPATIENT
Start: 2024-01-09 | End: 2024-01-09

## 2024-01-09 RX ORDER — ONDANSETRON HYDROCHLORIDE 2 MG/ML
4 INJECTION, SOLUTION INTRAVENOUS ONCE AS NEEDED
Status: CANCELLED | OUTPATIENT
Start: 2024-01-09

## 2024-01-09 RX ORDER — LABETALOL HYDROCHLORIDE 5 MG/ML
5 INJECTION, SOLUTION INTRAVENOUS ONCE AS NEEDED
Status: CANCELLED | OUTPATIENT
Start: 2024-01-09

## 2024-01-09 RX ORDER — ROCURONIUM BROMIDE 10 MG/ML
INJECTION, SOLUTION INTRAVENOUS AS NEEDED
Status: DISCONTINUED | OUTPATIENT
Start: 2024-01-09 | End: 2024-01-09

## 2024-01-09 RX ORDER — LIDOCAINE HYDROCHLORIDE 20 MG/ML
INJECTION, SOLUTION INFILTRATION; PERINEURAL AS NEEDED
Status: DISCONTINUED | OUTPATIENT
Start: 2024-01-09 | End: 2024-01-09

## 2024-01-09 RX ORDER — FENTANYL CITRATE 50 UG/ML
50 INJECTION, SOLUTION INTRAMUSCULAR; INTRAVENOUS EVERY 5 MIN PRN
Status: CANCELLED | OUTPATIENT
Start: 2024-01-09

## 2024-01-09 RX ORDER — MEPERIDINE HYDROCHLORIDE 25 MG/ML
12.5 INJECTION INTRAMUSCULAR; INTRAVENOUS; SUBCUTANEOUS EVERY 10 MIN PRN
Status: CANCELLED | OUTPATIENT
Start: 2024-01-09

## 2024-01-09 RX ORDER — INDOMETHACIN 50 MG/1
SUPPOSITORY RECTAL AS NEEDED
Status: COMPLETED | OUTPATIENT
Start: 2024-01-09 | End: 2024-01-09

## 2024-01-09 RX ORDER — SODIUM CHLORIDE, SODIUM LACTATE, POTASSIUM CHLORIDE, CALCIUM CHLORIDE 600; 310; 30; 20 MG/100ML; MG/100ML; MG/100ML; MG/100ML
INJECTION, SOLUTION INTRAVENOUS CONTINUOUS PRN
Status: DISCONTINUED | OUTPATIENT
Start: 2024-01-09 | End: 2024-01-09

## 2024-01-09 RX ORDER — LIDOCAINE HYDROCHLORIDE 10 MG/ML
0.1 INJECTION INFILTRATION; PERINEURAL ONCE
Status: CANCELLED | OUTPATIENT
Start: 2024-01-09 | End: 2024-01-09

## 2024-01-09 RX ORDER — PROPOFOL 10 MG/ML
INJECTION, EMULSION INTRAVENOUS AS NEEDED
Status: DISCONTINUED | OUTPATIENT
Start: 2024-01-09 | End: 2024-01-09

## 2024-01-09 RX ORDER — METOCLOPRAMIDE HYDROCHLORIDE 5 MG/ML
10 INJECTION INTRAMUSCULAR; INTRAVENOUS ONCE AS NEEDED
Status: CANCELLED | OUTPATIENT
Start: 2024-01-09

## 2024-01-09 RX ORDER — OXYCODONE HYDROCHLORIDE 5 MG/1
5 TABLET ORAL EVERY 4 HOURS PRN
Status: CANCELLED | OUTPATIENT
Start: 2024-01-09

## 2024-01-09 RX ORDER — DEXAMETHASONE SODIUM PHOSPHATE 4 MG/ML
INJECTION, SOLUTION INTRA-ARTICULAR; INTRALESIONAL; INTRAMUSCULAR; INTRAVENOUS; SOFT TISSUE AS NEEDED
Status: DISCONTINUED | OUTPATIENT
Start: 2024-01-09 | End: 2024-01-09

## 2024-01-09 RX ORDER — FENTANYL CITRATE 50 UG/ML
INJECTION, SOLUTION INTRAMUSCULAR; INTRAVENOUS AS NEEDED
Status: DISCONTINUED | OUTPATIENT
Start: 2024-01-09 | End: 2024-01-09

## 2024-01-09 RX ORDER — SODIUM CHLORIDE, SODIUM LACTATE, POTASSIUM CHLORIDE, CALCIUM CHLORIDE 600; 310; 30; 20 MG/100ML; MG/100ML; MG/100ML; MG/100ML
100 INJECTION, SOLUTION INTRAVENOUS CONTINUOUS
Status: CANCELLED | OUTPATIENT
Start: 2024-01-09

## 2024-01-09 RX ORDER — HYDROMORPHONE HYDROCHLORIDE 1 MG/ML
0.2 INJECTION, SOLUTION INTRAMUSCULAR; INTRAVENOUS; SUBCUTANEOUS EVERY 5 MIN PRN
Status: CANCELLED | OUTPATIENT
Start: 2024-01-09

## 2024-01-09 RX ADMIN — SUGAMMADEX 200 MG: 100 INJECTION, SOLUTION INTRAVENOUS at 10:11

## 2024-01-09 RX ADMIN — SUGAMMADEX 200 MG: 100 INJECTION, SOLUTION INTRAVENOUS at 10:09

## 2024-01-09 RX ADMIN — DEXAMETHASONE SODIUM PHOSPHATE 8 MG: 4 INJECTION, SOLUTION INTRA-ARTICULAR; INTRALESIONAL; INTRAMUSCULAR; INTRAVENOUS; SOFT TISSUE at 10:15

## 2024-01-09 RX ADMIN — INDOMETHACIN 100 MG: 50 SUPPOSITORY RECTAL at 10:05

## 2024-01-09 RX ADMIN — ROCURONIUM BROMIDE 50 MG: 10 INJECTION INTRAVENOUS at 09:46

## 2024-01-09 RX ADMIN — MIDAZOLAM 2 MG: 1 INJECTION INTRAMUSCULAR; INTRAVENOUS at 09:31

## 2024-01-09 RX ADMIN — PROPOFOL 200 MG: 10 INJECTION, EMULSION INTRAVENOUS at 09:45

## 2024-01-09 RX ADMIN — SODIUM CHLORIDE, POTASSIUM CHLORIDE, SODIUM LACTATE AND CALCIUM CHLORIDE: 600; 310; 30; 20 INJECTION, SOLUTION INTRAVENOUS at 10:28

## 2024-01-09 RX ADMIN — SODIUM CHLORIDE, POTASSIUM CHLORIDE, SODIUM LACTATE AND CALCIUM CHLORIDE: 600; 310; 30; 20 INJECTION, SOLUTION INTRAVENOUS at 10:10

## 2024-01-09 RX ADMIN — FENTANYL CITRATE 50 MCG: 50 INJECTION, SOLUTION INTRAMUSCULAR; INTRAVENOUS at 09:30

## 2024-01-09 RX ADMIN — LIDOCAINE HYDROCHLORIDE 100 MG: 20 INJECTION, SOLUTION INFILTRATION; PERINEURAL at 10:19

## 2024-01-09 RX ADMIN — ONDANSETRON 4 MG: 2 INJECTION INTRAMUSCULAR; INTRAVENOUS at 10:15

## 2024-01-09 SDOH — HEALTH STABILITY: MENTAL HEALTH: CURRENT SMOKER: 0

## 2024-01-09 ASSESSMENT — ENCOUNTER SYMPTOMS
VOMITING: 0
RECTAL PAIN: 0
COLOR CHANGE: 0
SHORTNESS OF BREATH: 0
CONSTIPATION: 0
NAUSEA: 0
FEVER: 0
DIARRHEA: 0
TROUBLE SWALLOWING: 0
CHILLS: 0
BLOOD IN STOOL: 0
ANAL BLEEDING: 0
UNEXPECTED WEIGHT CHANGE: 0
ABDOMINAL PAIN: 0
ABDOMINAL DISTENTION: 0

## 2024-01-09 ASSESSMENT — PAIN SCALES - GENERAL
PAINLEVEL_OUTOF10: 0 - NO PAIN
PAIN_LEVEL: 0
PAINLEVEL_OUTOF10: 0 - NO PAIN

## 2024-01-09 ASSESSMENT — PAIN - FUNCTIONAL ASSESSMENT
PAIN_FUNCTIONAL_ASSESSMENT: 0-10

## 2024-01-09 ASSESSMENT — COLUMBIA-SUICIDE SEVERITY RATING SCALE - C-SSRS
1. IN THE PAST MONTH, HAVE YOU WISHED YOU WERE DEAD OR WISHED YOU COULD GO TO SLEEP AND NOT WAKE UP?: NO
6. HAVE YOU EVER DONE ANYTHING, STARTED TO DO ANYTHING, OR PREPARED TO DO ANYTHING TO END YOUR LIFE?: NO
2. HAVE YOU ACTUALLY HAD ANY THOUGHTS OF KILLING YOURSELF?: NO

## 2024-01-09 NOTE — ANESTHESIA POSTPROCEDURE EVALUATION
Patient: Dk Alas    Procedure Summary       Date: 01/09/24 Room / Location: [facility]    Anesthesia Start: 0934 Anesthesia Stop: 1030    Procedure: ERCP Diagnosis: Obstructive jaundice    Scheduled Providers: Nathalia Ramos MD; Johanna Bucio RN; Josué Leavitt MD Responsible Provider: OUSMANE Vasquez    Anesthesia Type: MAC ASA Status: 2            Anesthesia Type: MAC    Vitals Value Taken Time   /86 01/09/24 1050   Temp 36.4 °C (97.6 °F) 01/09/24 1025   Pulse 71 01/09/24 1050   Resp 15 01/09/24 1050   SpO2 97 % 01/09/24 1050       Anesthesia Post Evaluation    Patient location during evaluation: PACU  Patient participation: complete - patient participated  Level of consciousness: awake and alert  Pain score: 0  Pain management: adequate  Airway patency: patent  Cardiovascular status: stable and acceptable  Respiratory status: acceptable  Hydration status: acceptable  Postoperative Nausea and Vomiting: none    No notable events documented.

## 2024-01-09 NOTE — H&P
History Of Present Illness  Dk Alas is a 43 y.o. male with a PMHx of T2DM , HTN, HLD, ADHD, anxiety and depression who initially presented to OSH with substernal chest pain and N/V in November. Labs showed elevated LFTs which led to RUQ US and CT scan that showed gallbladder stones and unremarkable bile duct. He was [place] [facility] [place] Overnight, he ended up getting a chest CT for the substernal chest pain and elevated D dimer, with findings of a mediastinal mass. He had EUS and ERCP 11/28/23 with findings of a mediastinal mass, multiple enlarged abdominal lymph nodes and localized biliary stricture. Biopsies of the mediastinal mass and LN were unrevealing, including repeat EUS with [facility] 12/11. Plan for surgical resection at end of month. He is here for ERCP for biliary stricture evaluation and stent removal/exchange.      Past Medical History  Past Medical History:   Diagnosis Date    Cholelithiasis     Hyperlipidemia     Hypertension     Motion sickness        Surgical History  Past Surgical History:   Procedure Laterality Date    CT ANGIO CORONARY ART WITH HEARTFLOW IF SCORE >30%  10/26/2021    CT ANGIO CORONARY ART WITH HEARTFLOW IF SCORE >30% 10/26/2021        Social History  He reports that he quit smoking about 4 years ago. His smoking use included cigarettes. He has never used smokeless tobacco. He reports that he does not drink alcohol and does not use drugs.    Family History  No family history on file.     Allergies  Ciprofloxacin and Nsaids (non-steroidal anti-inflammatory drug)    Review of Systems   Constitutional:  Negative for chills, fever and unexpected weight change.   HENT:  Negative for trouble swallowing.    Respiratory:  Negative for shortness of breath.    Cardiovascular:  Negative for chest pain.   Gastrointestinal:  Negative for abdominal distention, abdominal pain, anal bleeding, blood in stool, constipation, diarrhea, nausea, rectal pain and vomiting.   Skin:  Negative for  color change.          Physical Exam  Vitals reviewed.   Constitutional:       General: He is awake.   Cardiovascular:      Rate and Rhythm: Normal rate and regular rhythm.   Pulmonary:      Effort: Pulmonary effort is normal.      Breath sounds: Normal breath sounds.   Neurological:      Mental Status: He is alert and oriented to person, place, and time.   Psychiatric:         Attention and Perception: Attention and perception normal.         Behavior: Behavior normal.            Last Recorded Vitals  Blood pressure 123/72, pulse 70, temperature 36.4 °C (97.5 °F), resp. rate 16, SpO2 96 %.    Relevant Results  Reviewed chart       Assessment/Plan   Proceed with ERCP         Nathalia Ramos MD

## 2024-01-09 NOTE — ANESTHESIA PROCEDURE NOTES
Airway  Date/Time: 1/9/2024 9:46 AM  Urgency: elective      Staffing  Performed: CRNA   Authorized by: OUSMANE Vasquez    Performed by: OUSMANE Vasquez  Patient location during procedure: OR    Indications and Patient Condition  Indications for airway management: anesthesia  Spontaneous Ventilation: absent  Sedation level: deep  Preoxygenated: yes  Patient position: sniffing  Mask difficulty assessment: 2 - vent by mask + OA or adjuvant +/- NMBA    Final Airway Details  Final airway type: endotracheal airway      Successful airway: ETT  Cuffed: yes   Successful intubation technique: video laryngoscopy  Facilitating devices/methods: intubating stylet  Blade: Day  Blade size: #4  ETT size (mm): 7.5  Cormack-Lehane Classification: grade I - full view of glottis  Placement verified by: chest auscultation and capnometry   Measured from: lips  ETT to lips (cm): 23  Number of attempts at approach: 1  Number of other approaches attempted: 0

## 2024-01-09 NOTE — ANESTHESIA PREPROCEDURE EVALUATION
Patient: Dk Alas    Procedure Information       Date/Time: 01/09/24 0830    Scheduled providers: Nathalia Ramos MD; Johanna Bucio RN    Procedure: ERCP    Location: [facility]            Relevant Problems   Cardiovascular   (+) HTN (hypertension)      Endocrine   (+) Diabetes mellitus, type 2 (CMS/HCC)   (+) Hypothyroidism      Neuro/Psych   (+) ADHD   (+) Anxiety   (+) Depression      Hematology   (+) Anemia       Clinical information reviewed:   Tobacco  Allergies  Meds   Med Hx  Surg Hx   Fam Hx  Soc Hx        NPO Detail:  NPO/Void Status  Date of Last Liquid: 01/08/24  Time of Last Liquid: 1900  Date of Last Solid: 01/08/24  Time of Last Solid: 1900  Last Intake Type: Clear fluids         Physical Exam    Airway  Mallampati: II  TM distance: >3 FB  Neck ROM: full     Cardiovascular - normal exam  Rhythm: regular  Rate: normal     Dental - normal exam     Pulmonary - normal exam  Breath sounds clear to auscultation     Abdominal - normal exam           Anesthesia Plan    History of general anesthesia?: yes  History of complications of general anesthesia?: no    ASA 2     MAC     The patient is not a current smoker.    intravenous induction   Trial extubation is planned.  Anesthetic plan and risks discussed with patient.  Use of blood products discussed with patient who consented to blood products.    Plan discussed with CRNA.

## 2024-01-09 NOTE — DISCHARGE INSTRUCTIONS
Patient Instructions after an ERCP:      The anesthetics, sedatives or narcotics which were given to you today will be acting in your body for the next 24 hours, so you might feel a little sleepy or groggy.  This feeling should slowly wear off. Carefully read and follow the instructions.     You received sedation today:  - Do not drive or operate any machinery or power tools of any kind.   - No alcoholic beverages today, not even beer or wine.  - Do not make any important decisions or sign any legal documents.  - No over the counter medications that contain alcohol or that may cause drowsiness.  - Do not make any important decisions or sign any legal documents.    While it is common to experience mild to moderate abdominal distention, gas, or belching after your procedure, if any of these symptoms occur following discharge from the GI Lab or within one week of having your procedure, call the [facility] to be advised whether a visit to your nearest Urgent Care or Emergency Department is indicated.  Take this paper with you if you go.     - If you develop an allergic reaction to the medications that were given during your procedure such as difficulty breathing, rash, hives, severe nausea, vomiting or lightheadedness.- If you experience chest pain, shortness of breath, severe abdominal pain, fevers and chills.    -If you develop signs and symptoms of bleeding such as blood in your spit, if your stools turn black, tarry, or bloody    - If you have not urinated within 8 hours following your procedure.- If your IV site becomes painful, red, inflamed, or looks infected.    If you received a biopsy/polypectomy/sphincterotomy the following instructions apply below:    __ Do not use Aspirin containing products, non-steroidal medications or anti-coagulants for one week following your procedure. (Examples of these types of medications are: Advil, Arthrotec, Aleve, Coumadin, Ecotrin, Heparin, Ibuprofen, Indocin,  Motrin, Naprosyn, Nuprin, Plavix, Vioxx, and Voltarin, or their generic forms.  This list is not all-inclusive.  Check with your physician or pharmacist before resuming medications.)     __ Eat a soft diet today.  Avoid foods that are poorly digested for the next 24 hours.  These foods would include: nuts, beans, lettuce, red meats, and fried foods.      __ Start with liquids and advance your diet as tolerated, gradually work up to eating solids.     __ Do not have a Barium Study or Enema for one week.    Your physician recommends the additional following instructions:    ERCP: Recommend a repeat ERCP in eight weeks to exchange a stent. Watch for symptoms of pancreatitis, bleeding, perforation and cholangitis.  Please see Medication Reconciliation Form for new medication/medications prescribed.     If you experience any problems or have any questions following discharge from the GI Lab, please call:         Nurse Signature                                                                        Date___________________                                                                            Patient/Responsible Party Signature                                        Date___________________

## 2024-01-10 ASSESSMENT — PAIN SCALES - GENERAL: PAINLEVEL_OUTOF10: 0 - NO PAIN

## 2024-01-19 ENCOUNTER — APPOINTMENT (OUTPATIENT)
Dept: PREADMISSION TESTING | Facility: HOSPITAL | Age: 44
End: 2024-01-19
Payer: COMMERCIAL

## 2024-01-23 ENCOUNTER — PRE-ADMISSION TESTING (OUTPATIENT)
Dept: PREADMISSION TESTING | Facility: HOSPITAL | Age: 44
End: 2024-01-23
Payer: COMMERCIAL

## 2024-01-23 ENCOUNTER — HOSPITAL ENCOUNTER (OUTPATIENT)
Dept: CARDIOLOGY | Facility: HOSPITAL | Age: 44
Discharge: HOME | End: 2024-01-23
Payer: COMMERCIAL

## 2024-01-23 VITALS
RESPIRATION RATE: 18 BRPM | BODY MASS INDEX: 32.78 KG/M2 | WEIGHT: 229 LBS | HEART RATE: 76 BPM | TEMPERATURE: 98.2 F | DIASTOLIC BLOOD PRESSURE: 73 MMHG | HEIGHT: 70 IN | SYSTOLIC BLOOD PRESSURE: 111 MMHG

## 2024-01-23 DIAGNOSIS — J98.59 MEDIASTINAL MASS: Primary | ICD-10-CM

## 2024-01-23 DIAGNOSIS — J98.59 MEDIASTINAL MASS: ICD-10-CM

## 2024-01-23 LAB
ABO GROUP (TYPE) IN BLOOD: NORMAL
ANION GAP SERPL CALC-SCNC: 14 MMOL/L (ref 10–20)
ANTIBODY SCREEN: NORMAL
APTT PPP: 32 SECONDS (ref 27–38)
BUN SERPL-MCNC: 12 MG/DL (ref 6–23)
CALCIUM SERPL-MCNC: 9.3 MG/DL (ref 8.6–10.6)
CHLORIDE SERPL-SCNC: 103 MMOL/L (ref 98–107)
CO2 SERPL-SCNC: 26 MMOL/L (ref 21–32)
CREAT SERPL-MCNC: 1.09 MG/DL (ref 0.5–1.3)
EGFRCR SERPLBLD CKD-EPI 2021: 85 ML/MIN/1.73M*2
ERYTHROCYTE [DISTWIDTH] IN BLOOD BY AUTOMATED COUNT: 12.8 % (ref 11.5–14.5)
GLUCOSE SERPL-MCNC: 141 MG/DL (ref 74–99)
HCT VFR BLD AUTO: 39.3 % (ref 41–52)
HGB BLD-MCNC: 12.4 G/DL (ref 13.5–17.5)
INR PPP: 1 (ref 0.9–1.1)
MCH RBC QN AUTO: 26.8 PG (ref 26–34)
MCHC RBC AUTO-ENTMCNC: 31.6 G/DL (ref 32–36)
MCV RBC AUTO: 85 FL (ref 80–100)
NRBC BLD-RTO: 0 /100 WBCS (ref 0–0)
PLATELET # BLD AUTO: 342 X10*3/UL (ref 150–450)
POTASSIUM SERPL-SCNC: 4.3 MMOL/L (ref 3.5–5.3)
PROTHROMBIN TIME: 11.2 SECONDS (ref 9.8–12.8)
RBC # BLD AUTO: 4.62 X10*6/UL (ref 4.5–5.9)
RH FACTOR (ANTIGEN D): NORMAL
SODIUM SERPL-SCNC: 139 MMOL/L (ref 136–145)
WBC # BLD AUTO: 7.4 X10*3/UL (ref 4.4–11.3)

## 2024-01-23 PROCEDURE — 87081 CULTURE SCREEN ONLY: CPT | Performed by: NURSE PRACTITIONER

## 2024-01-23 PROCEDURE — 86901 BLOOD TYPING SEROLOGIC RH(D): CPT

## 2024-01-23 PROCEDURE — 99205 OFFICE O/P NEW HI 60 MIN: CPT | Performed by: NURSE PRACTITIONER

## 2024-01-23 PROCEDURE — 93005 ELECTROCARDIOGRAM TRACING: CPT

## 2024-01-23 PROCEDURE — 36415 COLL VENOUS BLD VENIPUNCTURE: CPT

## 2024-01-23 PROCEDURE — 80048 BASIC METABOLIC PNL TOTAL CA: CPT

## 2024-01-23 PROCEDURE — 85610 PROTHROMBIN TIME: CPT

## 2024-01-23 PROCEDURE — 93010 ELECTROCARDIOGRAM REPORT: CPT | Performed by: INTERNAL MEDICINE

## 2024-01-23 PROCEDURE — 85027 COMPLETE CBC AUTOMATED: CPT

## 2024-01-23 RX ORDER — CHLORHEXIDINE GLUCONATE 40 MG/ML
SOLUTION TOPICAL DAILY PRN
Qty: 473 ML | Refills: 0 | Status: SHIPPED | OUTPATIENT
Start: 2024-01-23 | End: 2024-01-31 | Stop reason: HOSPADM

## 2024-01-23 ASSESSMENT — CHADS2 SCORE
CHADS2 SCORE: 1
CHF: NO
HYPERTENSION: YES
PRIOR STROKE OR TIA OR THROMBOEMBOLISM: NO
AGE GREATER THAN OR EQUAL TO 75: NO
DIABETES: NO

## 2024-01-23 ASSESSMENT — DUKE ACTIVITY SCORE INDEX (DASI)
CAN YOU CLIMB A FLIGHT OF STAIRS OR WALK UP A HILL: YES
TOTAL_SCORE: 32.2
CAN YOU PARTICIPATE IN MODERATE RECREATIONAL ACTIVITIES LIKE GOLF, BOWLING, DANCING, DOUBLES TENNIS OR THROWING A BASEBALL OR FOOTBALL: NO
DASI METS SCORE: 6.7
CAN YOU TAKE CARE OF YOURSELF (EAT, DRESS, BATHE, OR USE TOILET): YES
CAN YOU RUN A SHORT DISTANCE: YES
CAN YOU DO YARD WORK LIKE RAKING LEAVES, WEEDING OR PUSHING A MOWER: NO
CAN YOU WALK A BLOCK OR TWO ON LEVEL GROUND: YES
CAN YOU PARTICIPATE IN STRENOUS SPORTS LIKE SWIMMING, SINGLES TENNIS, FOOTBALL, BASKETBALL, OR SKIING: NO
CAN YOU DO HEAVY WORK AROUND THE HOUSE LIKE SCRUBBING FLOORS OR LIFTING AND MOVING HEAVY FURNITURE: NO
CAN YOU WALK INDOORS, SUCH AS AROUND YOUR HOUSE: YES
CAN YOU DO MODERATE WORK AROUND THE HOUSE LIKE VACUUMING, SWEEPING FLOORS OR CARRYING GROCERIES: YES
CAN YOU DO LIGHT WORK AROUND THE HOUSE LIKE DUSTING OR WASHING DISHES: YES
CAN YOU HAVE SEXUAL RELATIONS: YES

## 2024-01-23 ASSESSMENT — ENCOUNTER SYMPTOMS
NEUROLOGICAL NEGATIVE: 1
GASTROINTESTINAL NEGATIVE: 1
UNEXPECTED WEIGHT CHANGE: 1
NECK NEGATIVE: 1
CARDIOVASCULAR NEGATIVE: 1
EYES NEGATIVE: 1
RESPIRATORY NEGATIVE: 1
ENDOCRINE NEGATIVE: 1
MUSCULOSKELETAL NEGATIVE: 1

## 2024-01-23 ASSESSMENT — LIFESTYLE VARIABLES: SMOKING_STATUS: NONSMOKER

## 2024-01-23 NOTE — CPM/PAT H&P
CPM/PAT Evaluation       Name: Dk Alas (Dk Alas)  /Age: 1980/43 y.o.     Visit Type:   In-Person       Chief Complaint: Mediastinal Mass    HPI  Patient is a 43-year-old male with a past medical history significant for HTN, HLD, diabetes type 2, hypothyroidism, cholelithiasis, status post ERCP, anxiety, depression, ADHD, and mediastinal mass.  Patient is being evaluated in CPM in anticipation of a robotic posterior mediastinal mass resection with Dr. Benitez on 24.  Past Medical History:   Diagnosis Date    ADHD (attention deficit hyperactivity disorder)     Anxiety     Cholelithiasis     Contusion of right shoulder     Follows with PT Jonnie Rader    Depression     Diabetes mellitus (CMS/HCC)     Manged by PCP    H/O electrocardiogram     NORMAL ECG in     History of ERCP 2024    Hyperlipidemia     Hypertension     Hypothyroidism     Mediastinal mass 2024    Gen: Tyrone Benitez    Motion sickness     Obstructive jaundice     Per ERCP on 24    Pulmonary nodule     solid non-calcified pulmonary nodule measuring greater than 8 mm.       Past Surgical History:   Procedure Laterality Date    BILE DUCT STENT PLACEMENT      Stent placed 2023 for narrowing of CBD    CT ANGIO CHEST FOR PULMONARY EMBOLISM      CT scan on 23    CT ANGIO CORONARY ART WITH HEARTFLOW IF SCORE >30%  10/26/2021    CT ANGIO CORONARY ART WITH HEARTFLOW IF SCORE >30% 10/26/2021       Patient Sexual activity questions deferred to the physician.    Family History   Problem Relation Name Age of Onset    Diabetes Mother      Hypertension Mother      Heart attack Mother      Heart attack Maternal Grandmother         Allergies   Allergen Reactions    Ciprofloxacin Hives and Other     Rapid heart rate, fainted    Nsaids (Non-Steroidal Anti-Inflammatory Drug) Hives       Prior to Admission medications    Medication Sig Start Date End Date Taking? Authorizing Provider   amphetamine-dextroamphetamine XR (Adderall  XR) 15 mg 24 hr capsule Take 1 capsule (15 mg) by mouth once daily in the morning. Do not crush or chew.   Yes Historical Provider, MD   atorvastatin (Lipitor) 20 mg tablet Take 1 tablet (20 mg) by mouth once daily.   Yes Historical Provider, MD   buPROPion XL (Wellbutrin XL) 300 mg 24 hr tablet Take 1 tablet (300 mg) by mouth once daily. Do not crush, chew, or split.   Yes Historical Provider, MD   busPIRone (Buspar) 5 mg tablet Take by mouth 3 times a day as needed.   Yes Historical Provider, MD   cyanocobalamin (Vitamin B-12) 250 mcg tablet Take by mouth once daily.   Yes Historical Provider, MD   docosahexaenoic acid/epa (FISH OIL ORAL) Take by mouth.   Yes Historical Provider, MD   levothyroxine (Synthroid, Levoxyl) 200 mcg tablet Take 1 tablet (200 mcg) by mouth once daily in the morning. Take before meals.   Yes Historical Provider, MD   lisinopril 10 mg tablet Take 1 tablet (10 mg) by mouth once daily.   Yes Historical Provider, MD   metFORMIN, OSM, (Fortamet) 1,000 mg 24 hr tablet Take 1 tablet (1,000 mg) by mouth once daily in the evening. Take with meals. Do not crush, chew, or split.   Yes Historical Provider, MD   tirzepatide (Mounjaro) 10 mg/0.5 mL pen injector Inject 10 mg under the skin. Takes one time weekly   Yes Historical Provider, MD        PAT ROS:   Constitutional:    unexpected weight change (25 pounds)  Neuro/Psych:   neg    Eyes:   neg    Ears:   neg    Nose:   neg    Mouth:   neg    Throat:   neg    Neck:   neg    Cardio:   neg    Respiratory:   neg    Endocrine:   neg    GI:   neg    :   neg    Musculoskeletal:   neg    Hematologic:   neg    Skin:  neg        Physical Exam  Vitals reviewed. Physical exam within normal limits.          PAT AIRWAY:   Airway:     Mallampati::  III    TM distance::  >3 FB    Neck ROM::  Full  normal        Visit Vitals  /73   Pulse 76   Temp 36.8 °C (98.2 °F)   Resp 18       DASI Risk Score      Flowsheet Row Most Recent Value   DASI SCORE 32.2   METS  Score (Will be calculated only when all the questions are answered) 6.7          Caprini DVT Assessment      Flowsheet Row Most Recent Value   DVT Score 9   Current Status Major surgery planned, lasting over 3 hours   Age 40-59 years   BMI 31-40 (Obesity)          Modified Frailty Index      Flowsheet Row Most Recent Value   Modified Frailty Index Calculator .1818          CHADS2 Stroke Risk  Current as of 24 minutes ago        N/A 3 - 100%: High Risk   2 - 3%: Medium Risk   0 - 2%: Low Risk     Last Change: N/A          This score determines the patient's risk of having a stroke if the patient has atrial fibrillation.        This score is not applicable to this patient. Components are not calculated.          Revised Cardiac Risk Index      Flowsheet Row Most Recent Value   Revised Cardiac Risk Calculator 1          Apfel Simplified Score      Flowsheet Row Most Recent Value   Apfel Simplified Score Calculator 2          Risk Analysis Index Results This Encounter    No data found in the last 1 encounters.       Stop Bang Score      Flowsheet Row Most Recent Value   Do you snore loudly? 0   Do you often feel tired or fatigued after your sleep? 0   Has anyone ever observed you stop breathing in your sleep? 0   Do you have or are you being treated for high blood pressure? 1   Recent BMI (Calculated) 32.9   Is BMI greater than 35 kg/m2? 0=No   Age older than 50 years old? 0=No   Gender - Male 1=Yes            Assessment and Plan:   Anesthesia:  The patient denies problems with anesthesia in the past such as PONV, prolonged sedation, awareness, dental damage, aspiration, cardiac arrest, difficult intubation, or unexpected hospital admissions.     Neuro:   The patient has no neurological diagnoses or significant findings on chart review, clinical presentation, and evaluation.  No grossly apparent perioperative risk.    HEENT/Airway  No diagnoses, significant findings on chart review, clinical presentation, or  evaluation.    Cardiovascular  The patient is scheduled for non-cardiac surgery associated with elevated risk.  The patient has no major cardiac contraindications to non- cardiac surgery.  RCRI  The patient meets 2 RCRI criteria and therefore has a 6.6% risk (elevated) of major adverse cardiac complications.  METS  The patient's functional capacity capacity is greater than 4 METS.  EKG  The patient has no EKG or echocardiographic changes concerning for myocardial ischemia.   Heart Failure  The patient has no known history of heart failure.  Additionally, the patient reports no symptoms of heart failure and demonstrates no signs of heart failure.  Hypertension Evaluation  The patient has a known history of hypertension that is controlled.  Patient's hypertension is most consistent with stage 1.  Heart Rhythm Evaluation  The patient has no history of arrhythmias.  Heart Valve Evaluation  The patient has no known history of valvular heart disease. The patient has no symptoms or physical exam findings to suggest valvular heart disease.  CARDS EVAL  The patient is not followed by cardiology.  The patient has a 30-day risk for MACE of 2 predictors, 10.1% risk for cardiac death, nonfatal myocardial infarction, and nonfatal cardiac arrest.  MARYCARMEN score which indicates a 0.1% risk of intraoperative or 30-day postoperative.    Pulmonary   No significant findings on chart review or clinical presentation and evaluation. The patient is at increased risk of perioperative pulmonary complications secondary to thoracic surgery.  The patient has a stop bang score of 3, which places patient at intermediate risk for having MAYUR.    ARISCAT 40, Intermediate, 13.3% risk of in-hospital postoperative pulmonary complications  PRODIGY 0, low risk of respiratory depression episode.    Hematology  The patient has diagnoses or significant findings on chart review or clinical presentation and evaluation significant for mediastinal  mass.  Antiplatelet management   The patient is not currently receiving antiplatelet therapy.  Anticoagulation management  The patient is not currently receiving anticoagulation therapy.  Caprini score 9, high risk of perioperative VTE  Transfusion Evaluation  A type and screen was obtained given the likelihood for perioperative transfusion of blood or blood products.    Gastrointestinal  No diagnoses or significant findings on chart review or clinical presentation and evaluation.  Eat 10- 0,  self-perceived oropharyngeal dysphagia scale (0-40)     Genitourinary  No diagnoses or significant findings on chart review or clinical presentation and evaluation.    Renal  The patient has no known history of chronic kidney disease. The patient has specific risk factors associated with increased risk of perioperative renal complications due to male gender, hypertension, diabetes mellitus.    Musculoskeletal  No diagnoses or significant findings on chart review or clinical presentation and evaluation.    Endocrine  Diabetes Evaluation  The patient has history of diabetes mellitus controlled by medication  Thyroid Disease Evaluation  The patient has a history of thyroid disease that appears controlled.    ID  No diagnoses or significant findings on chart review or clinical presentation and evaluation., MRSA screening obtained. Prescriptions given for Hibiclens and Peridex.    -Preoperative medication instructions were provided and reviewed with the patient.  Any additional testing or evaluation was explained to the patient.  NPO Instructions were discussed, and the patient's questions were answered prior to conclusion of this encounter

## 2024-01-25 LAB — STAPHYLOCOCCUS SPEC CULT: NORMAL

## 2024-01-26 LAB
ATRIAL RATE: 68 BPM
P AXIS: 49 DEGREES
P OFFSET: 186 MS
P ONSET: 137 MS
PR INTERVAL: 158 MS
Q ONSET: 216 MS
QRS COUNT: 11 BEATS
QRS DURATION: 82 MS
QT INTERVAL: 406 MS
QTC CALCULATION(BAZETT): 431 MS
QTC FREDERICIA: 423 MS
R AXIS: 68 DEGREES
T AXIS: 61 DEGREES
T OFFSET: 419 MS
VENTRICULAR RATE: 68 BPM

## 2024-01-26 RX ORDER — SODIUM CHLORIDE, SODIUM LACTATE, POTASSIUM CHLORIDE, CALCIUM CHLORIDE 600; 310; 30; 20 MG/100ML; MG/100ML; MG/100ML; MG/100ML
100 INJECTION, SOLUTION INTRAVENOUS CONTINUOUS
Status: CANCELLED | OUTPATIENT
Start: 2024-01-26

## 2024-01-29 ENCOUNTER — ANESTHESIA EVENT (OUTPATIENT)
Dept: OPERATING ROOM | Facility: HOSPITAL | Age: 44
DRG: 165 | End: 2024-01-29
Payer: COMMERCIAL

## 2024-01-30 ENCOUNTER — HOSPITAL ENCOUNTER (INPATIENT)
Facility: HOSPITAL | Age: 44
LOS: 1 days | Discharge: HOME | DRG: 165 | End: 2024-01-31
Attending: STUDENT IN AN ORGANIZED HEALTH CARE EDUCATION/TRAINING PROGRAM | Admitting: STUDENT IN AN ORGANIZED HEALTH CARE EDUCATION/TRAINING PROGRAM
Payer: COMMERCIAL

## 2024-01-30 ENCOUNTER — APPOINTMENT (OUTPATIENT)
Dept: RADIOLOGY | Facility: HOSPITAL | Age: 44
DRG: 165 | End: 2024-01-30
Payer: COMMERCIAL

## 2024-01-30 ENCOUNTER — ANESTHESIA (OUTPATIENT)
Dept: OPERATING ROOM | Facility: HOSPITAL | Age: 44
DRG: 165 | End: 2024-01-30
Payer: COMMERCIAL

## 2024-01-30 ENCOUNTER — APPOINTMENT (OUTPATIENT)
Dept: CARDIOLOGY | Facility: HOSPITAL | Age: 44
DRG: 165 | End: 2024-01-30
Payer: COMMERCIAL

## 2024-01-30 DIAGNOSIS — J98.59 MEDIASTINAL MASS: Primary | ICD-10-CM

## 2024-01-30 LAB
ABO GROUP (TYPE) IN BLOOD: NORMAL
GLUCOSE BLD MANUAL STRIP-MCNC: 117 MG/DL (ref 74–99)
GLUCOSE BLD MANUAL STRIP-MCNC: 154 MG/DL (ref 74–99)
GLUCOSE BLD MANUAL STRIP-MCNC: 156 MG/DL (ref 74–99)
GLUCOSE BLD MANUAL STRIP-MCNC: 171 MG/DL (ref 74–99)
GLUCOSE BLD MANUAL STRIP-MCNC: 216 MG/DL (ref 74–99)
RH FACTOR (ANTIGEN D): NORMAL

## 2024-01-30 PROCEDURE — 2500000005 HC RX 250 GENERAL PHARMACY W/O HCPCS: Mod: SE | Performed by: STUDENT IN AN ORGANIZED HEALTH CARE EDUCATION/TRAINING PROGRAM

## 2024-01-30 PROCEDURE — 2500000004 HC RX 250 GENERAL PHARMACY W/ HCPCS (ALT 636 FOR OP/ED): Mod: SE | Performed by: ANESTHESIOLOGY

## 2024-01-30 PROCEDURE — 3700000002 HC GENERAL ANESTHESIA TIME - EACH INCREMENTAL 1 MINUTE: Performed by: STUDENT IN AN ORGANIZED HEALTH CARE EDUCATION/TRAINING PROGRAM

## 2024-01-30 PROCEDURE — 43235 EGD DIAGNOSTIC BRUSH WASH: CPT | Performed by: STUDENT IN AN ORGANIZED HEALTH CARE EDUCATION/TRAINING PROGRAM

## 2024-01-30 PROCEDURE — 7100000001 HC RECOVERY ROOM TIME - INITIAL BASE CHARGE: Performed by: STUDENT IN AN ORGANIZED HEALTH CARE EDUCATION/TRAINING PROGRAM

## 2024-01-30 PROCEDURE — A39220 PR EXC MEDIASTINAL TUMOR: Performed by: ANESTHESIOLOGY

## 2024-01-30 PROCEDURE — 2500000004 HC RX 250 GENERAL PHARMACY W/ HCPCS (ALT 636 FOR OP/ED): Mod: SE | Performed by: PHYSICIAN ASSISTANT

## 2024-01-30 PROCEDURE — 82947 ASSAY GLUCOSE BLOOD QUANT: CPT

## 2024-01-30 PROCEDURE — 1200000002 HC GENERAL ROOM WITH TELEMETRY DAILY

## 2024-01-30 PROCEDURE — 2500000002 HC RX 250 W HCPCS SELF ADMINISTERED DRUGS (ALT 637 FOR MEDICARE OP, ALT 636 FOR OP/ED): Performed by: PHYSICIAN ASSISTANT

## 2024-01-30 PROCEDURE — 88312 SPECIAL STAINS GROUP 1: CPT | Performed by: PATHOLOGY

## 2024-01-30 PROCEDURE — 0WBC4ZZ EXCISION OF MEDIASTINUM, PERCUTANEOUS ENDOSCOPIC APPROACH: ICD-10-PCS | Performed by: STUDENT IN AN ORGANIZED HEALTH CARE EDUCATION/TRAINING PROGRAM

## 2024-01-30 PROCEDURE — 2500000004 HC RX 250 GENERAL PHARMACY W/ HCPCS (ALT 636 FOR OP/ED): Mod: SE | Performed by: STUDENT IN AN ORGANIZED HEALTH CARE EDUCATION/TRAINING PROGRAM

## 2024-01-30 PROCEDURE — 93005 ELECTROCARDIOGRAM TRACING: CPT

## 2024-01-30 PROCEDURE — 2500000002 HC RX 250 W HCPCS SELF ADMINISTERED DRUGS (ALT 637 FOR MEDICARE OP, ALT 636 FOR OP/ED): Mod: SE | Performed by: STUDENT IN AN ORGANIZED HEALTH CARE EDUCATION/TRAINING PROGRAM

## 2024-01-30 PROCEDURE — 2780000003 HC OR 278 NO HCPCS: Performed by: STUDENT IN AN ORGANIZED HEALTH CARE EDUCATION/TRAINING PROGRAM

## 2024-01-30 PROCEDURE — 36620 INSERTION CATHETER ARTERY: CPT | Performed by: STUDENT IN AN ORGANIZED HEALTH CARE EDUCATION/TRAINING PROGRAM

## 2024-01-30 PROCEDURE — 0DJ08ZZ INSPECTION OF UPPER INTESTINAL TRACT, VIA NATURAL OR ARTIFICIAL OPENING ENDOSCOPIC: ICD-10-PCS | Performed by: STUDENT IN AN ORGANIZED HEALTH CARE EDUCATION/TRAINING PROGRAM

## 2024-01-30 PROCEDURE — 3700000001 HC GENERAL ANESTHESIA TIME - INITIAL BASE CHARGE: Performed by: STUDENT IN AN ORGANIZED HEALTH CARE EDUCATION/TRAINING PROGRAM

## 2024-01-30 PROCEDURE — 36415 COLL VENOUS BLD VENIPUNCTURE: CPT | Performed by: ANESTHESIOLOGY

## 2024-01-30 PROCEDURE — 71045 X-RAY EXAM CHEST 1 VIEW: CPT | Performed by: RADIOLOGY

## 2024-01-30 PROCEDURE — 2500000001 HC RX 250 WO HCPCS SELF ADMINISTERED DRUGS (ALT 637 FOR MEDICARE OP): Performed by: PHYSICIAN ASSISTANT

## 2024-01-30 PROCEDURE — 8E0W4CZ ROBOTIC ASSISTED PROCEDURE OF TRUNK REGION, PERCUTANEOUS ENDOSCOPIC APPROACH: ICD-10-PCS | Performed by: STUDENT IN AN ORGANIZED HEALTH CARE EDUCATION/TRAINING PROGRAM

## 2024-01-30 PROCEDURE — 31622 DX BRONCHOSCOPE/WASH: CPT | Performed by: STUDENT IN AN ORGANIZED HEALTH CARE EDUCATION/TRAINING PROGRAM

## 2024-01-30 PROCEDURE — 3600000009 HC OR TIME - EACH INCREMENTAL 1 MINUTE - PROCEDURE LEVEL FOUR: Performed by: STUDENT IN AN ORGANIZED HEALTH CARE EDUCATION/TRAINING PROGRAM

## 2024-01-30 PROCEDURE — 2500000005 HC RX 250 GENERAL PHARMACY W/O HCPCS: Mod: SE | Performed by: PAIN MEDICINE

## 2024-01-30 PROCEDURE — 88307 TISSUE EXAM BY PATHOLOGIST: CPT | Performed by: PATHOLOGY

## 2024-01-30 PROCEDURE — 32662 THORACOSCOPY W/MEDIAST EXC: CPT | Performed by: PHYSICIAN ASSISTANT

## 2024-01-30 PROCEDURE — 88305 TISSUE EXAM BY PATHOLOGIST: CPT | Performed by: PATHOLOGY

## 2024-01-30 PROCEDURE — 71045 X-RAY EXAM CHEST 1 VIEW: CPT

## 2024-01-30 PROCEDURE — 32666 THORACOSCOPY W/WEDGE RESECT: CPT | Performed by: PHYSICIAN ASSISTANT

## 2024-01-30 PROCEDURE — 88331 PATH CONSLTJ SURG 1 BLK 1SPC: CPT | Mod: TC,SUR | Performed by: PATHOLOGY

## 2024-01-30 PROCEDURE — 3600000004 HC OR TIME - INITIAL BASE CHARGE - PROCEDURE LEVEL FOUR: Performed by: STUDENT IN AN ORGANIZED HEALTH CARE EDUCATION/TRAINING PROGRAM

## 2024-01-30 PROCEDURE — 7100000002 HC RECOVERY ROOM TIME - EACH INCREMENTAL 1 MINUTE: Performed by: STUDENT IN AN ORGANIZED HEALTH CARE EDUCATION/TRAINING PROGRAM

## 2024-01-30 PROCEDURE — 32662 THORACOSCOPY W/MEDIAST EXC: CPT | Performed by: STUDENT IN AN ORGANIZED HEALTH CARE EDUCATION/TRAINING PROGRAM

## 2024-01-30 PROCEDURE — 2720000007 HC OR 272 NO HCPCS: Performed by: STUDENT IN AN ORGANIZED HEALTH CARE EDUCATION/TRAINING PROGRAM

## 2024-01-30 PROCEDURE — 32666 THORACOSCOPY W/WEDGE RESECT: CPT | Performed by: STUDENT IN AN ORGANIZED HEALTH CARE EDUCATION/TRAINING PROGRAM

## 2024-01-30 PROCEDURE — 0BBF4ZZ EXCISION OF RIGHT LOWER LUNG LOBE, PERCUTANEOUS ENDOSCOPIC APPROACH: ICD-10-PCS | Performed by: STUDENT IN AN ORGANIZED HEALTH CARE EDUCATION/TRAINING PROGRAM

## 2024-01-30 PROCEDURE — 0BJ08ZZ INSPECTION OF TRACHEOBRONCHIAL TREE, VIA NATURAL OR ARTIFICIAL OPENING ENDOSCOPIC: ICD-10-PCS | Performed by: STUDENT IN AN ORGANIZED HEALTH CARE EDUCATION/TRAINING PROGRAM

## 2024-01-30 PROCEDURE — 88307 TISSUE EXAM BY PATHOLOGIST: CPT | Mod: TC,SUR | Performed by: STUDENT IN AN ORGANIZED HEALTH CARE EDUCATION/TRAINING PROGRAM

## 2024-01-30 RX ORDER — LIDOCAINE HYDROCHLORIDE 20 MG/ML
INJECTION, SOLUTION INFILTRATION; PERINEURAL AS NEEDED
Status: DISCONTINUED | OUTPATIENT
Start: 2024-01-30 | End: 2024-01-30

## 2024-01-30 RX ORDER — CEFAZOLIN 1 G/1
INJECTION, POWDER, FOR SOLUTION INTRAVENOUS AS NEEDED
Status: DISCONTINUED | OUTPATIENT
Start: 2024-01-30 | End: 2024-01-30

## 2024-01-30 RX ORDER — PROPOFOL 10 MG/ML
INJECTION, EMULSION INTRAVENOUS AS NEEDED
Status: DISCONTINUED | OUTPATIENT
Start: 2024-01-30 | End: 2024-01-30

## 2024-01-30 RX ORDER — FENTANYL CITRATE 50 UG/ML
50 INJECTION, SOLUTION INTRAMUSCULAR; INTRAVENOUS EVERY 5 MIN PRN
Status: DISCONTINUED | OUTPATIENT
Start: 2024-01-30 | End: 2024-01-30

## 2024-01-30 RX ORDER — OXYCODONE AND ACETAMINOPHEN 5; 325 MG/1; MG/1
1 TABLET ORAL EVERY 4 HOURS PRN
Status: DISCONTINUED | OUTPATIENT
Start: 2024-01-30 | End: 2024-01-30

## 2024-01-30 RX ORDER — DIPHENHYDRAMINE HYDROCHLORIDE 50 MG/ML
12.5 INJECTION INTRAMUSCULAR; INTRAVENOUS ONCE AS NEEDED
Status: DISCONTINUED | OUTPATIENT
Start: 2024-01-30 | End: 2024-01-30 | Stop reason: HOSPADM

## 2024-01-30 RX ORDER — HEPARIN SODIUM 5000 [USP'U]/ML
5000 INJECTION, SOLUTION INTRAVENOUS; SUBCUTANEOUS ONCE
Status: COMPLETED | OUTPATIENT
Start: 2024-01-30 | End: 2024-01-30

## 2024-01-30 RX ORDER — HYDROMORPHONE HYDROCHLORIDE 1 MG/ML
0.5 INJECTION, SOLUTION INTRAMUSCULAR; INTRAVENOUS; SUBCUTANEOUS EVERY 5 MIN PRN
Status: DISCONTINUED | OUTPATIENT
Start: 2024-01-30 | End: 2024-01-30 | Stop reason: HOSPADM

## 2024-01-30 RX ORDER — CEFAZOLIN SODIUM 2 G/100ML
2 INJECTION, SOLUTION INTRAVENOUS ONCE
Status: DISCONTINUED | OUTPATIENT
Start: 2024-01-30 | End: 2024-01-30 | Stop reason: HOSPADM

## 2024-01-30 RX ORDER — ROCURONIUM BROMIDE 10 MG/ML
INJECTION, SOLUTION INTRAVENOUS AS NEEDED
Status: DISCONTINUED | OUTPATIENT
Start: 2024-01-30 | End: 2024-01-30

## 2024-01-30 RX ORDER — HYDROMORPHONE HCL/0.9% NACL/PF 15 MG/30ML
PATIENT CONTROLLED ANALGESIA SYRINGE INTRAVENOUS CONTINUOUS
Status: DISCONTINUED | OUTPATIENT
Start: 2024-01-30 | End: 2024-01-31

## 2024-01-30 RX ORDER — SODIUM CHLORIDE, SODIUM LACTATE, POTASSIUM CHLORIDE, CALCIUM CHLORIDE 600; 310; 30; 20 MG/100ML; MG/100ML; MG/100ML; MG/100ML
100 INJECTION, SOLUTION INTRAVENOUS CONTINUOUS
Status: DISCONTINUED | OUTPATIENT
Start: 2024-01-30 | End: 2024-01-30

## 2024-01-30 RX ORDER — DEXTROSE MONOHYDRATE 100 MG/ML
0.3 INJECTION, SOLUTION INTRAVENOUS ONCE AS NEEDED
Status: DISCONTINUED | OUTPATIENT
Start: 2024-01-30 | End: 2024-01-31 | Stop reason: HOSPADM

## 2024-01-30 RX ORDER — DEXTROSE MONOHYDRATE 100 MG/ML
0.3 INJECTION, SOLUTION INTRAVENOUS ONCE AS NEEDED
Status: DISCONTINUED | OUTPATIENT
Start: 2024-01-30 | End: 2024-01-30 | Stop reason: HOSPADM

## 2024-01-30 RX ORDER — IPRATROPIUM BROMIDE AND ALBUTEROL SULFATE 2.5; .5 MG/3ML; MG/3ML
3 SOLUTION RESPIRATORY (INHALATION) EVERY 6 HOURS PRN
Status: DISCONTINUED | OUTPATIENT
Start: 2024-01-30 | End: 2024-01-31 | Stop reason: HOSPADM

## 2024-01-30 RX ORDER — HYDROMORPHONE HYDROCHLORIDE 1 MG/ML
INJECTION, SOLUTION INTRAMUSCULAR; INTRAVENOUS; SUBCUTANEOUS AS NEEDED
Status: DISCONTINUED | OUTPATIENT
Start: 2024-01-30 | End: 2024-01-30

## 2024-01-30 RX ORDER — ONDANSETRON HYDROCHLORIDE 2 MG/ML
4 INJECTION, SOLUTION INTRAVENOUS ONCE AS NEEDED
Status: DISCONTINUED | OUTPATIENT
Start: 2024-01-30 | End: 2024-01-30

## 2024-01-30 RX ORDER — DEXTROSE 50 % IN WATER (D50W) INTRAVENOUS SYRINGE
25
Status: DISCONTINUED | OUTPATIENT
Start: 2024-01-30 | End: 2024-01-30 | Stop reason: HOSPADM

## 2024-01-30 RX ORDER — FENTANYL CITRATE 50 UG/ML
INJECTION, SOLUTION INTRAMUSCULAR; INTRAVENOUS
Status: COMPLETED
Start: 2024-01-30 | End: 2024-01-30

## 2024-01-30 RX ORDER — METFORMIN HYDROCHLORIDE 500 MG/1
500 TABLET ORAL
Status: ON HOLD | COMMUNITY
Start: 2023-09-06 | End: 2024-01-30 | Stop reason: ENTERED-IN-ERROR

## 2024-01-30 RX ORDER — ATORVASTATIN CALCIUM 20 MG/1
20 TABLET, FILM COATED ORAL DAILY
Status: DISCONTINUED | OUTPATIENT
Start: 2024-01-30 | End: 2024-01-31 | Stop reason: HOSPADM

## 2024-01-30 RX ORDER — BUPROPION HYDROCHLORIDE 150 MG/1
300 TABLET ORAL DAILY
Status: DISCONTINUED | OUTPATIENT
Start: 2024-01-30 | End: 2024-01-31 | Stop reason: HOSPADM

## 2024-01-30 RX ORDER — LABETALOL HYDROCHLORIDE 5 MG/ML
5 INJECTION, SOLUTION INTRAVENOUS EVERY 10 MIN PRN
Status: DISCONTINUED | OUTPATIENT
Start: 2024-01-30 | End: 2024-01-30 | Stop reason: HOSPADM

## 2024-01-30 RX ORDER — DEXTROSE 50 % IN WATER (D50W) INTRAVENOUS SYRINGE
25
Status: DISCONTINUED | OUTPATIENT
Start: 2024-01-30 | End: 2024-01-31 | Stop reason: HOSPADM

## 2024-01-30 RX ORDER — PIMECROLIMUS 10 MG/G
1 CREAM TOPICAL DAILY
COMMUNITY
Start: 2024-01-02

## 2024-01-30 RX ORDER — PHENYLEPHRINE HCL IN 0.9% NACL 0.4MG/10ML
SYRINGE (ML) INTRAVENOUS AS NEEDED
Status: DISCONTINUED | OUTPATIENT
Start: 2024-01-30 | End: 2024-01-30

## 2024-01-30 RX ORDER — ACETAMINOPHEN 325 MG/1
650 TABLET ORAL EVERY 4 HOURS PRN
Status: DISCONTINUED | OUTPATIENT
Start: 2024-01-30 | End: 2024-01-30

## 2024-01-30 RX ORDER — HYDROMORPHONE HYDROCHLORIDE 1 MG/ML
INJECTION, SOLUTION INTRAMUSCULAR; INTRAVENOUS; SUBCUTANEOUS
Status: COMPLETED
Start: 2024-01-30 | End: 2024-01-31

## 2024-01-30 RX ORDER — FENTANYL CITRATE 50 UG/ML
INJECTION, SOLUTION INTRAMUSCULAR; INTRAVENOUS AS NEEDED
Status: DISCONTINUED | OUTPATIENT
Start: 2024-01-30 | End: 2024-01-30

## 2024-01-30 RX ORDER — AMOXICILLIN 250 MG
2 CAPSULE ORAL 2 TIMES DAILY
Status: DISCONTINUED | OUTPATIENT
Start: 2024-01-30 | End: 2024-01-31 | Stop reason: HOSPADM

## 2024-01-30 RX ORDER — ACETAMINOPHEN 325 MG/1
650 TABLET ORAL EVERY 8 HOURS PRN
Status: DISCONTINUED | OUTPATIENT
Start: 2024-01-30 | End: 2024-01-30

## 2024-01-30 RX ORDER — MEPERIDINE HYDROCHLORIDE 25 MG/ML
12.5 INJECTION INTRAMUSCULAR; INTRAVENOUS; SUBCUTANEOUS EVERY 10 MIN PRN
Status: DISCONTINUED | OUTPATIENT
Start: 2024-01-30 | End: 2024-01-30

## 2024-01-30 RX ORDER — SODIUM CHLORIDE, SODIUM LACTATE, POTASSIUM CHLORIDE, CALCIUM CHLORIDE 600; 310; 30; 20 MG/100ML; MG/100ML; MG/100ML; MG/100ML
100 INJECTION, SOLUTION INTRAVENOUS CONTINUOUS
Status: DISCONTINUED | OUTPATIENT
Start: 2024-01-30 | End: 2024-01-30 | Stop reason: HOSPADM

## 2024-01-30 RX ORDER — METOCLOPRAMIDE HYDROCHLORIDE 5 MG/ML
10 INJECTION INTRAMUSCULAR; INTRAVENOUS ONCE AS NEEDED
Status: DISCONTINUED | OUTPATIENT
Start: 2024-01-30 | End: 2024-01-30 | Stop reason: HOSPADM

## 2024-01-30 RX ORDER — HYDROMORPHONE HYDROCHLORIDE 1 MG/ML
0.2 INJECTION, SOLUTION INTRAMUSCULAR; INTRAVENOUS; SUBCUTANEOUS EVERY 5 MIN PRN
Status: DISCONTINUED | OUTPATIENT
Start: 2024-01-30 | End: 2024-01-30

## 2024-01-30 RX ORDER — MEPERIDINE HYDROCHLORIDE 25 MG/ML
12.5 INJECTION INTRAMUSCULAR; INTRAVENOUS; SUBCUTANEOUS EVERY 10 MIN PRN
Status: DISCONTINUED | OUTPATIENT
Start: 2024-01-30 | End: 2024-01-30 | Stop reason: HOSPADM

## 2024-01-30 RX ORDER — MIDAZOLAM HYDROCHLORIDE 1 MG/ML
INJECTION INTRAMUSCULAR; INTRAVENOUS
Status: DISCONTINUED
Start: 2024-01-30 | End: 2024-01-31 | Stop reason: HOSPADM

## 2024-01-30 RX ORDER — HYDROMORPHONE HYDROCHLORIDE 1 MG/ML
0.2 INJECTION, SOLUTION INTRAMUSCULAR; INTRAVENOUS; SUBCUTANEOUS EVERY 5 MIN PRN
Status: DISCONTINUED | OUTPATIENT
Start: 2024-01-30 | End: 2024-01-30 | Stop reason: HOSPADM

## 2024-01-30 RX ORDER — OXYCODONE HYDROCHLORIDE 5 MG/1
5 TABLET ORAL EVERY 4 HOURS PRN
Status: DISCONTINUED | OUTPATIENT
Start: 2024-01-30 | End: 2024-01-30

## 2024-01-30 RX ORDER — REMIFENTANIL HYDROCHLORIDE 1 MG/ML
INJECTION, POWDER, LYOPHILIZED, FOR SOLUTION INTRAVENOUS AS NEEDED
Status: DISCONTINUED | OUTPATIENT
Start: 2024-01-30 | End: 2024-01-30

## 2024-01-30 RX ORDER — ONDANSETRON HYDROCHLORIDE 2 MG/ML
4 INJECTION, SOLUTION INTRAVENOUS EVERY 8 HOURS PRN
Status: DISCONTINUED | OUTPATIENT
Start: 2024-01-30 | End: 2024-01-31 | Stop reason: HOSPADM

## 2024-01-30 RX ORDER — LIDOCAINE HYDROCHLORIDE 10 MG/ML
0.1 INJECTION INFILTRATION; PERINEURAL ONCE
Status: DISCONTINUED | OUTPATIENT
Start: 2024-01-30 | End: 2024-01-30

## 2024-01-30 RX ORDER — LABETALOL HYDROCHLORIDE 5 MG/ML
5 INJECTION, SOLUTION INTRAVENOUS ONCE AS NEEDED
Status: DISCONTINUED | OUTPATIENT
Start: 2024-01-30 | End: 2024-01-30

## 2024-01-30 RX ORDER — AMOXICILLIN AND CLAVULANATE POTASSIUM 875; 125 MG/1; MG/1
875 TABLET, FILM COATED ORAL EVERY 12 HOURS SCHEDULED
Status: DISCONTINUED | OUTPATIENT
Start: 2024-01-30 | End: 2024-01-31 | Stop reason: HOSPADM

## 2024-01-30 RX ORDER — INSULIN LISPRO 100 [IU]/ML
0-5 INJECTION, SOLUTION INTRAVENOUS; SUBCUTANEOUS
Status: DISCONTINUED | OUTPATIENT
Start: 2024-01-30 | End: 2024-01-31 | Stop reason: HOSPADM

## 2024-01-30 RX ORDER — ONDANSETRON HYDROCHLORIDE 2 MG/ML
INJECTION, SOLUTION INTRAVENOUS AS NEEDED
Status: DISCONTINUED | OUTPATIENT
Start: 2024-01-30 | End: 2024-01-30

## 2024-01-30 RX ORDER — NALOXONE HYDROCHLORIDE 0.4 MG/ML
0.2 INJECTION, SOLUTION INTRAMUSCULAR; INTRAVENOUS; SUBCUTANEOUS AS NEEDED
Status: DISCONTINUED | OUTPATIENT
Start: 2024-01-30 | End: 2024-01-31 | Stop reason: HOSPADM

## 2024-01-30 RX ORDER — MIDAZOLAM HYDROCHLORIDE 1 MG/ML
INJECTION, SOLUTION INTRAMUSCULAR; INTRAVENOUS AS NEEDED
Status: DISCONTINUED | OUTPATIENT
Start: 2024-01-30 | End: 2024-01-30

## 2024-01-30 RX ORDER — REMIFENTANIL HYDROCHLORIDE 1 MG/ML
INJECTION, POWDER, LYOPHILIZED, FOR SOLUTION INTRAVENOUS
Status: COMPLETED
Start: 2024-01-30 | End: 2024-01-30

## 2024-01-30 RX ORDER — LEVOTHYROXINE SODIUM 200 UG/1
200 TABLET ORAL
Status: DISCONTINUED | OUTPATIENT
Start: 2024-01-31 | End: 2024-01-31 | Stop reason: HOSPADM

## 2024-01-30 RX ORDER — ALBUTEROL SULFATE 0.83 MG/ML
2.5 SOLUTION RESPIRATORY (INHALATION) ONCE AS NEEDED
Status: DISCONTINUED | OUTPATIENT
Start: 2024-01-30 | End: 2024-01-30 | Stop reason: HOSPADM

## 2024-01-30 RX ORDER — BUPIVACAINE HCL/EPINEPHRINE 0.25-.0005
VIAL (ML) INJECTION AS NEEDED
Status: DISCONTINUED | OUTPATIENT
Start: 2024-01-30 | End: 2024-01-30 | Stop reason: HOSPADM

## 2024-01-30 RX ORDER — ACETAMINOPHEN 325 MG/1
650 TABLET ORAL EVERY 4 HOURS PRN
Status: DISCONTINUED | OUTPATIENT
Start: 2024-01-30 | End: 2024-01-30 | Stop reason: HOSPADM

## 2024-01-30 RX ORDER — DEXAMETHASONE SODIUM PHOSPHATE 4 MG/ML
INJECTION, SOLUTION INTRA-ARTICULAR; INTRALESIONAL; INTRAMUSCULAR; INTRAVENOUS; SOFT TISSUE AS NEEDED
Status: DISCONTINUED | OUTPATIENT
Start: 2024-01-30 | End: 2024-01-30

## 2024-01-30 RX ORDER — LABETALOL HYDROCHLORIDE 5 MG/ML
INJECTION, SOLUTION INTRAVENOUS AS NEEDED
Status: DISCONTINUED | OUTPATIENT
Start: 2024-01-30 | End: 2024-01-30

## 2024-01-30 RX ORDER — HEPARIN SODIUM 5000 [USP'U]/ML
5000 INJECTION, SOLUTION INTRAVENOUS; SUBCUTANEOUS EVERY 8 HOURS
Status: DISCONTINUED | OUTPATIENT
Start: 2024-01-30 | End: 2024-01-31 | Stop reason: HOSPADM

## 2024-01-30 RX ORDER — PROPOFOL 10 MG/ML
INJECTION, EMULSION INTRAVENOUS
Status: COMPLETED
Start: 2024-01-30 | End: 2024-01-30

## 2024-01-30 RX ORDER — ONDANSETRON HYDROCHLORIDE 2 MG/ML
4 INJECTION, SOLUTION INTRAVENOUS ONCE AS NEEDED
Status: DISCONTINUED | OUTPATIENT
Start: 2024-01-30 | End: 2024-01-30 | Stop reason: HOSPADM

## 2024-01-30 RX ORDER — ACETAMINOPHEN 325 MG/1
650 TABLET ORAL EVERY 4 HOURS PRN
Status: DISCONTINUED | OUTPATIENT
Start: 2024-01-30 | End: 2024-01-31 | Stop reason: HOSPADM

## 2024-01-30 RX ADMIN — INSULIN HUMAN 4 UNITS: 100 INJECTION, SOLUTION PARENTERAL at 13:39

## 2024-01-30 RX ADMIN — HYDROMORPHONE HYDROCHLORIDE 0.6 MG: 1 INJECTION, SOLUTION INTRAMUSCULAR; INTRAVENOUS; SUBCUTANEOUS at 10:07

## 2024-01-30 RX ADMIN — REMIFENTANIL HYDROCHLORIDE 0.05 MCG/KG/MIN: 1 INJECTION, POWDER, LYOPHILIZED, FOR SOLUTION INTRAVENOUS at 07:54

## 2024-01-30 RX ADMIN — SUGAMMADEX 200 MG: 100 INJECTION, SOLUTION INTRAVENOUS at 09:51

## 2024-01-30 RX ADMIN — REMIFENTANIL HYDROCHLORIDE 20 MCG: 1 INJECTION, POWDER, LYOPHILIZED, FOR SOLUTION INTRAVENOUS at 08:06

## 2024-01-30 RX ADMIN — HYDROMORPHONE HYDROCHLORIDE 0.4 MG: 1 INJECTION, SOLUTION INTRAMUSCULAR; INTRAVENOUS; SUBCUTANEOUS at 09:32

## 2024-01-30 RX ADMIN — HEPARIN SODIUM 5000 UNITS: 5000 INJECTION INTRAVENOUS; SUBCUTANEOUS at 07:06

## 2024-01-30 RX ADMIN — HYDROMORPHONE HYDROCHLORIDE 0.5 MG: 1 INJECTION, SOLUTION INTRAMUSCULAR; INTRAVENOUS; SUBCUTANEOUS at 11:04

## 2024-01-30 RX ADMIN — SODIUM CHLORIDE, POTASSIUM CHLORIDE, SODIUM LACTATE AND CALCIUM CHLORIDE 100 ML/HR: 600; 310; 30; 20 INJECTION, SOLUTION INTRAVENOUS at 11:13

## 2024-01-30 RX ADMIN — AMOXICILLIN AND CLAVULANATE POTASSIUM 875 MG: 875; 125 TABLET, FILM COATED ORAL at 22:33

## 2024-01-30 RX ADMIN — CEFAZOLIN 2 G: 330 INJECTION, POWDER, FOR SOLUTION INTRAMUSCULAR; INTRAVENOUS at 07:35

## 2024-01-30 RX ADMIN — ROCURONIUM BROMIDE 80 MG: 10 INJECTION, SOLUTION INTRAVENOUS at 07:28

## 2024-01-30 RX ADMIN — HEPARIN SODIUM 5000 UNITS: 5000 INJECTION INTRAVENOUS; SUBCUTANEOUS at 16:51

## 2024-01-30 RX ADMIN — LABETALOL HYDROCHLORIDE 10 MG: 5 INJECTION, SOLUTION INTRAVENOUS at 09:48

## 2024-01-30 RX ADMIN — PROPOFOL 50 MG: 10 INJECTION, EMULSION INTRAVENOUS at 08:00

## 2024-01-30 RX ADMIN — Medication 40 MCG: at 07:28

## 2024-01-30 RX ADMIN — DEXAMETHASONE SODIUM PHOSPHATE 4 MG: 4 INJECTION, SOLUTION INTRAMUSCULAR; INTRAVENOUS at 08:22

## 2024-01-30 RX ADMIN — HYDROMORPHONE HYDROCHLORIDE 0.5 MG: 1 INJECTION, SOLUTION INTRAMUSCULAR; INTRAVENOUS; SUBCUTANEOUS at 10:33

## 2024-01-30 RX ADMIN — FENTANYL CITRATE 100 MCG: 50 INJECTION, SOLUTION INTRAMUSCULAR; INTRAVENOUS at 07:28

## 2024-01-30 RX ADMIN — Medication: at 10:30

## 2024-01-30 RX ADMIN — INSULIN LISPRO 1 UNITS: 100 INJECTION, SOLUTION INTRAVENOUS; SUBCUTANEOUS at 16:57

## 2024-01-30 RX ADMIN — MIDAZOLAM 2 MG: 1 INJECTION INTRAMUSCULAR; INTRAVENOUS at 07:26

## 2024-01-30 RX ADMIN — ATORVASTATIN CALCIUM 20 MG: 20 TABLET, FILM COATED ORAL at 16:00

## 2024-01-30 RX ADMIN — SENNOSIDES AND DOCUSATE SODIUM 2 TABLET: 8.6; 5 TABLET ORAL at 22:33

## 2024-01-30 RX ADMIN — HYDROMORPHONE HYDROCHLORIDE 0.5 MG: 1 INJECTION, SOLUTION INTRAMUSCULAR; INTRAVENOUS; SUBCUTANEOUS at 10:24

## 2024-01-30 RX ADMIN — HYDROMORPHONE HYDROCHLORIDE 0.2 MG: 1 INJECTION, SOLUTION INTRAMUSCULAR; INTRAVENOUS; SUBCUTANEOUS at 12:32

## 2024-01-30 RX ADMIN — HYDROMORPHONE HYDROCHLORIDE 0.2 MG: 1 INJECTION, SOLUTION INTRAMUSCULAR; INTRAVENOUS; SUBCUTANEOUS at 12:21

## 2024-01-30 RX ADMIN — Medication: at 12:41

## 2024-01-30 RX ADMIN — LIDOCAINE HYDROCHLORIDE 100 MG: 20 INJECTION, SOLUTION INFILTRATION; PERINEURAL at 07:28

## 2024-01-30 RX ADMIN — ONDANSETRON 4 MG: 2 INJECTION, SOLUTION INTRAMUSCULAR; INTRAVENOUS at 09:31

## 2024-01-30 RX ADMIN — BUPROPION HYDROCHLORIDE 300 MG: 150 TABLET, FILM COATED, EXTENDED RELEASE ORAL at 17:56

## 2024-01-30 RX ADMIN — PROPOFOL 150 MG: 10 INJECTION, EMULSION INTRAVENOUS at 07:28

## 2024-01-30 SDOH — HEALTH STABILITY: MENTAL HEALTH: CURRENT SMOKER: 0

## 2024-01-30 ASSESSMENT — PAIN SCALES - GENERAL
PAINLEVEL_OUTOF10: 4
PAINLEVEL_OUTOF10: 4
PAINLEVEL_OUTOF10: 10 - WORST POSSIBLE PAIN
PAINLEVEL_OUTOF10: 4
PAINLEVEL_OUTOF10: 8
PAINLEVEL_OUTOF10: 6
PAINLEVEL_OUTOF10: 4
PAINLEVEL_OUTOF10: 4
PAINLEVEL_OUTOF10: 6
PAINLEVEL_OUTOF10: 6
PAINLEVEL_OUTOF10: 9
PAINLEVEL_OUTOF10: 0 - NO PAIN
PAINLEVEL_OUTOF10: 10 - WORST POSSIBLE PAIN
PAINLEVEL_OUTOF10: 4
PAIN_LEVEL: 8
PAINLEVEL_OUTOF10: 8
PAINLEVEL_OUTOF10: 6
PAINLEVEL_OUTOF10: 6
PAINLEVEL_OUTOF10: 4
PAINLEVEL_OUTOF10: 10 - WORST POSSIBLE PAIN
PAINLEVEL_OUTOF10: 6
PAINLEVEL_OUTOF10: 9
PAINLEVEL_OUTOF10: 4
PAINLEVEL_OUTOF10: 6

## 2024-01-30 ASSESSMENT — PAIN - FUNCTIONAL ASSESSMENT

## 2024-01-30 ASSESSMENT — COGNITIVE AND FUNCTIONAL STATUS - GENERAL
TOILETING: A LITTLE
MOBILITY SCORE: 17
PERSONAL GROOMING: A LITTLE
CLIMB 3 TO 5 STEPS WITH RAILING: A LOT
DAILY ACTIVITIY SCORE: 19
DRESSING REGULAR UPPER BODY CLOTHING: A LITTLE
TURNING FROM BACK TO SIDE WHILE IN FLAT BAD: A LITTLE
MOVING FROM LYING ON BACK TO SITTING ON SIDE OF FLAT BED WITH BEDRAILS: A LITTLE
WALKING IN HOSPITAL ROOM: A LITTLE
HELP NEEDED FOR BATHING: A LITTLE
MOVING TO AND FROM BED TO CHAIR: A LITTLE
DRESSING REGULAR LOWER BODY CLOTHING: A LITTLE
STANDING UP FROM CHAIR USING ARMS: A LITTLE

## 2024-01-30 ASSESSMENT — PAIN DESCRIPTION - LOCATION
LOCATION: CHEST

## 2024-01-30 ASSESSMENT — PAIN DESCRIPTION - ORIENTATION
ORIENTATION: RIGHT
ORIENTATION: RIGHT

## 2024-01-30 ASSESSMENT — COLUMBIA-SUICIDE SEVERITY RATING SCALE - C-SSRS
1. IN THE PAST MONTH, HAVE YOU WISHED YOU WERE DEAD OR WISHED YOU COULD GO TO SLEEP AND NOT WAKE UP?: NO
2. HAVE YOU ACTUALLY HAD ANY THOUGHTS OF KILLING YOURSELF?: NO
6. HAVE YOU EVER DONE ANYTHING, STARTED TO DO ANYTHING, OR PREPARED TO DO ANYTHING TO END YOUR LIFE?: NO

## 2024-01-30 NOTE — BRIEF OP NOTE
Date: 2024  OR Location: [facility] OR    Name: Dk Alas : 1980, Age: 43 y.o., MRN: [id number], Sex: male    Diagnosis  Pre-op Diagnosis     * Mediastinal mass [J98.59] Post-op Diagnosis     * Mediastinal mass [J98.59]     Procedures  Robotic assisted posterior mediastinal mass resection, RLL wedge, EGD, bronchoscopy    Surgeons      * Tyrone Benitez - Primary    Resident/Fellow/Other Assistant:  Surgeon(s) and Role:     * Gricel Ibanez MD - Resident - Assisting    Procedure Summary  Anesthesia: General  ASA: III  Anesthesia Staff: Anesthesiologist: Kamar Singh MD; Dax Roche MD  C-AA: THALIA Villafana  Anesthesia Resident: Florence Dorman DO  Estimated Blood Loss: 50mL  Intra-op Medications:   Administrations occurring from 0710 to 1055 on 24:   Medication Name Total Dose   fentaNYL PF (Sublimaze) injection  - Omnicell Override Pull Cannot be calculated   HYDROmorphone (Dilaudid) injection  - Omnicell Override Pull Cannot be calculated   propofol (Diprivan) injection  - Omnicell Override Pull Cannot be calculated   remifentanil (Ultiva) injection  - Omnicell Override Pull Cannot be calculated              Anesthesia Record               Intraprocedure I/O Totals          Intake    Remifentanil Drip 0.00 mL    The total shown is the total volume documented since Anesthesia Start was filed.    Total Intake 0 mL          Specimen:   ID Type Source Tests Collected by Time   1 : Right lobectomy wedge Tissue LUNG LOBECTOMY/SEGMENTECTOMY RIGHT (SPECIFY SITE) SURGICAL PATHOLOGY EXAM Tyrone Benitez MD 2024 0827   2 : Infected Bronchiogenic Cyst Tissue LUNG LOBECTOMY/SEGMENTECTOMY RIGHT (SPECIFY SITE) SURGICAL PATHOLOGY EXAM Tyrone Benitez MD 2024 0921        Staff:   Scrub Person: Tyra Thorne RN; Kristal Lynn          Findings: Bronchogenic cyst, cyst capsule violated - immediate return of pus. No violation into esophagus on EGD at case conclusion. Right lower lobe nodule -  granuloma on frozen section.     Complications:  None; patient tolerated the procedure well.     Disposition: PACU - hemodynamically stable.  Condition: stable  Specimens Collected:   ID Type Source Tests Collected by Time   1 : Right lobectomy wedge Tissue LUNG LOBECTOMY/SEGMENTECTOMY RIGHT (SPECIFY SITE) SURGICAL PATHOLOGY EXAM Tyrone Benitez MD 1/30/2024 6367   2 : Infected Bronchiogenic Cyst Tissue LUNG LOBECTOMY/SEGMENTECTOMY RIGHT (SPECIFY SITE) SURGICAL PATHOLOGY EXAM Tyrone Benitez MD 1/30/2024 0845     Gricel Ibanez, PGY2  General Surgery    Attending Attestation:     Tyrone Benitez  Phone Number: [phone]

## 2024-01-30 NOTE — OP NOTE
Date: 2024  OR Location: [facility] OR    Name: Dk Alas : 1980, Age: 43 y.o., MRN: [id number], Sex: male    Preop Diagnosis: mediastinal mass  Postop Diagnosis: infected bronchogenic cyst, granuloma of lung    Procedures:  Robotic assisted posterior mediastinal mass resection  Robotic assisted RLL wedge resection  Bronchoscopy  EGD  Intercostal nerve block    Surgeons:  Tyrone Benitez MD    Resident/Fellow/Other Assistant:  Ama TERAN - who is required at bedside as first assist.     Anesthesia: General  ASA: III  Anesthesia Staff: Anesthesiologist: Kamar Singh MD; Dax Roche MD  C-AA: THALIA Villafana  Anesthesia Resident: Florence Dorman DO  Estimated Blood Loss: 20mL  Intra-op Medications:   Administrations occurring from 0710 to 1055 on 24:   Medication Name Total Dose   fentaNYL PF (Sublimaze) injection  - Omnicell Override Pull Cannot be calculated   HYDROmorphone (Dilaudid) injection  - Omnicell Override Pull Cannot be calculated   propofol (Diprivan) injection  - Omnicell Override Pull Cannot be calculated   remifentanil (Ultiva) injection  - Omnicell Override Pull Cannot be calculated            Anesthesia Record               Intraprocedure I/O Totals          Intake    Remifentanil Drip 0.00 mL    The total shown is the total volume documented since Anesthesia Start was filed.    Total Intake 0 mL          Specimen:   ID Type Source Tests Collected by Time   1 : Right lobectomy wedge Tissue LUNG LOBECTOMY/SEGMENTECTOMY RIGHT (SPECIFY SITE) SURGICAL PATHOLOGY EXAM Tyrone Benitez MD 2024 0859   2 : Infected Bronchiogenic Cyst Tissue LUNG LOBECTOMY/SEGMENTECTOMY RIGHT (SPECIFY SITE) SURGICAL PATHOLOGY EXAM Tyrone Benitez MD 2024 0921        Staff:   Scrub Person: Tyra Thorne RN; Kristal Lynn    Findings: Inflamed posterior mediastinal mass with purulent drainage consistent with infected bronchogenic cyst.  Right lower lobe 10 mm mobile hard nodule frozen  confirmed granuloma no malignancy.      Indication:  This is a 43-year-old gentleman with history of obstructing jaundice who has incidental found posterior mediastinal mass.  This mass has being biopsied twice by EUS and nondiagnostic.  I offered the patient a robotic assisted posterior mediastinal mass resection.  Incidentally he also found to have right lower lobe 10 mm nodule which radiology recommended either close follow-up or tissue biopsy.  I offered to him a wedge resection of this right lower lobe peripheral nodule during the mediastinal mass resection.  The risk of surgery include bleeding, infection, injury to nearby organs, air leak and postop pain.  The alternatives continue serial imaging and biopsy again.  Patient consent to proceed with surgery.    Operation Details:  Patient was brought to operation room. A time-out was performed. Patient name, MRN and procedure were confirmed and all staff were in agreement. Patient received subcutaneous heparin. SCDs were placed and working. Ancef was given prior to incision. Patient was induced and intubated with a double lumen tube without issue. We then performed bronchoscopy through the double lumen tube. The bronchoscopy examination was normal, no endobronchial pathology seen. Then we turned the patient to left decubitus position. All pressure points are padded. Double lumen was confirmed again in correct position. The right chest was prepped and draped in sterile fashion. A pre-incision time out was performed. All staff are in agreement to proceed.     I placed my 8 mm camera port in the eighth intercostal space anteriorly.  I then performed intercostal nerve block with quarter percent Marcaine 4 cc per intercostal space from the 3rd-10th.  I placed 2 additional 8 mm ports in the same interspace and I placed one 12 mm port in the seventh intercostal space anteriorly.  My assistant port was at 10th intercostal space.  The air seal was used to insufflate the  pleural space and the patient tolerated well. I then docked the robot without issue. I used a robotic 8mm camera and then I introduced a cardia grasper, long-tip bipolar grasper and tip-up grasper into the chest under visual guidance. I then scrubbed out to the console.    I started by performing the wedge resection. The tumor was easily palpated in the peripheral right lower lobe.  It was hard and mobile.  I then used 2 fire of black loads robotic stapler to completely resected the tumor. I used an endocatch bag to remove the specimen from the chest. I then palpated the specimen myself and confirmed the area of concern was within the specimen.  The specimen was then sent for frozen section which eventually come back as granuloma without malignancy identified.    I then turned my attention to the posterior mediastinal mass resection.  This mass was easily visualized in the posterior mediastinum near esophagus posteriorly extending down from subcarinal space.  I started by taking down the inferior pulmonary ligament.  I then continued to release posterior pleura and reflected lung anteriorly.  The mass was inflamed and stuck to the airway anteriorly.  I was able to slowly develop a plane between the airway and the mass.  Posteriorly the mass was stuck onto the esophagus.  I was able to slowly peel the mass off the esophagus wall without injuring the esophagus wall.  The mass continue extending towards the subcarinal space.  There is a multiple enlarged lymph nodes around this area stuck to the mass which I took en bloc with the mass.  The subcarinal space was very inflamed and adhesed.  I entered the mass here with suction .  Copious purulent pus was suctioned out using suction  without spillage.  Then the mass was deflated.  I was able to resected the mass from the subcarinal space.  I left behind a small amount posterior wall of the cyst due to the inflammation and adhesions which I felt is not  safe to continue dissecting into the space.  The mucosa of remaining cyst wall was cauterized.  I then irrigated the chest with the sterile saline.  I ensured hemostasis of the dissection bed and the port site.      Due to the cyst's close proximity to esophagus, I performed the EGD to examine the esophagus and the stomach.  I used an adult gastroscope to intubate the esophagus.  The esophagus mucosa was healthy without any bleeding or erythema noted.  Stomach was entered and it was healthy.  I desufflated the stomach and esophagus and removed my gastroscope of the mouth.    I then scrubbed back into the field.  We undocked the robot and removed all the instruments under visual guidance.  I ensured hemostasis again.  I left a 28 Upper sorbian straight chest tube posteriorly.  I then reinsufflated the lung under visual inspection.  The lung come up nicely.  The chest tube was secured to the chest wall with 0 Ethibond.  The port sites were then closed with 2-0 Vicryl and 3-0 Vicryl sutures.  The final count was correct. Then the patient was allowed to wake up from anesthesia without difficulty and brought to the recovery room in stable condition.    I was present for the entire duration of the procedure.    Tyrone Benitez MD  Thoracic Surgeon  [facility]   [facility]  [facility]  Office phone: [phone]  Fax: [phone]  Pager: [phone]

## 2024-01-30 NOTE — ANESTHESIA POSTPROCEDURE EVALUATION
Patient: Dk Alas    Procedure Summary       Date: 01/30/24 Room / Location: [facility] OR 14 / Virtual [facility] OR    Anesthesia Start: 0720 Anesthesia Stop: 1017    Procedure: Robotic posterior mediastinal mass resection, right chest approach (Right: Chest) Diagnosis:       Mediastinal mass      (Mediastinal mass [J98.59])    Surgeons: Tyrone Benitez MD Responsible Provider: Kamar Singh MD    Anesthesia Type: general ASA Status: 3            Anesthesia Type: general    Vitals Value Taken Time   /86 01/30/24 1021   Temp 36.1 01/30/24 1021   Pulse 78 01/30/24 1021   Resp 20 01/30/24 1021   SpO2 98 01/30/24 1021       Anesthesia Post Evaluation    Patient location during evaluation: PACU  Patient participation: complete - patient participated  Level of consciousness: agitated and awake and alert  Pain score: 8  Pain management: adequate  Airway patency: patent  Cardiovascular status: hypertensive and blood pressure returned to baseline  Respiratory status: acceptable and face mask  Hydration status: acceptable  Postoperative Nausea and Vomiting: none        No notable events documented.

## 2024-01-30 NOTE — ANESTHESIA PROCEDURE NOTES
Arterial Line:    Date/Time: 1/30/2024 7:37 AM    Staffing  Performed: resident   Authorized by: Dax Roche MD    Performed by: Florence Dorman DO    An arterial line was placed. Procedure performed using surface landmarks.in the OR for the following indication(s): continuous blood pressure monitoring.    A 20 gauge (size), 1 and 3/4 inch (length), Angiocath (type) catheter was placed into the Left ulnar artery, secured by Tegaderm,   Seldingsimi technique used.  Events:  patient tolerated procedure well with no complications.

## 2024-01-30 NOTE — ANESTHESIA PROCEDURE NOTES
Airway  Date/Time: 1/30/2024 7:33 AM  Urgency: elective      Staffing  Performed: resident   Authorized by: Dax Roche MD    Performed by: Florence Dorman DO  Patient location during procedure: OR    Indications and Patient Condition  Indications for airway management: anesthesia  Spontaneous Ventilation: absent  Sedation level: deep  Preoxygenated: yes  Patient position: sniffing  MILS maintained throughout  Mask difficulty assessment: 1 - vent by mask  Planned trial extubation    Final Airway Details  Final airway type: endotracheal airway      Successful airway: ETT - double lumen left  Cuffed: yes   Successful intubation technique: direct laryngoscopy  Facilitating devices/methods: intubating stylet and cricoid pressure  Endotracheal tube insertion site: oral  Blade: Day  Blade size: #4  ETT DL size (fr): 37  Cormack-Lehane Classification: grade I - full view of glottis  Placement verified by: chest auscultation, bronchoscopy and capnometry   Measured from: gums  Number of attempts at approach: 1

## 2024-01-30 NOTE — ANESTHESIA PREPROCEDURE EVALUATION
Patient: Dk Alas    Procedure Information       Date/Time: 01/30/24 0710    Procedure: Robotic posterior mediastinal mass resection, right chest approach (Right: Chest)    Location: [facility] OR 14 / Virtual [facility] OR    Surgeons: Tyrone Benitez MD            Relevant Problems   Anesthesia (within normal limits)      Cardiovascular   (+) HTN (hypertension)      Endocrine   (+) Diabetes mellitus, type 2 (CMS/HCC)   (+) Hypothyroidism      GI (within normal limits)      /Renal (within normal limits)      Neuro/Psych   (+) ADHD   (+) Anxiety   (+) Depression      Pulmonary (within normal limits)      GI/Hepatic (within normal limits)      Hematology   (+) Anemia      Musculoskeletal (within normal limits)  Torn ligament in R shoulder, abduction limited to 30 degree and flexion to 90 degrees       Clinical information reviewed:   Tobacco  Allergies    Med Hx  Surg Hx   Fam Hx  Soc Hx        NPO Detail:  NPO/Void Status  Date of Last Liquid: 01/29/24  Time of Last Liquid: 2200  Date of Last Solid: 01/29/24  Time of Last Solid: 2200  Time of Last Void: 0200         Physical Exam    Airway  Mallampati: I  TM distance: >3 FB  Neck ROM: limited     Cardiovascular - normal exam     Dental - normal exam     Pulmonary - normal exam     Abdominal - normal exam             Anesthesia Plan    History of general anesthesia?: yes  History of complications of general anesthesia?: no    ASA 3     general     The patient is not a current smoker.  Patient was not previously instructed to abstain from smoking on day of procedure.  Patient did not smoke on day of procedure.    intravenous induction   Postoperative administration of opioids is intended.  Trial extubation is planned.  Anesthetic plan and risks discussed with patient.  Use of blood products discussed with patient who.    Plan discussed with resident and attending.

## 2024-01-30 NOTE — PROGRESS NOTES
Pharmacy Medication History Review    Dk Alas is a 43 y.o. male admitted for Mediastinal mass. Pharmacy reviewed the patient's qjiyu-[id number] medications and allergies for accuracy.    The list below reflects the updated PTA list. Comments regarding how patient may be taking medications differently can be found in the Admit Orders Activity  Prior to Admission Medications   Prescriptions Last Dose Informant Patient Reported?   amphetamine-dextroamphetamine (Adderall) 15 mg tablet 1/29/2024 Self Yes   Sig: Take 1 tablet (15 mg) by mouth once daily in the morning. Do not crush or chew.   atorvastatin (Lipitor) 20 mg tablet 1/29/2024 Self Yes   Sig: Take 1 tablet (20 mg) by mouth once daily.   buPROPion XL (Wellbutrin XL) 300 mg 24 hr tablet 1/29/2024 Self Yes   Sig: Take 1 tablet (300 mg) by mouth once daily. Do not crush, chew, or split.   busPIRone (Buspar) 5 mg tablet 1/29/2024 Self Yes   Sig: Take by mouth 3 times a day as needed.   chlorhexidine (Hibiclens) 4 % external liquid   No   Sig: Apply topically once daily as needed for wound care for up to 5 days.   cyanocobalamin (Vitamin B-12) 250 mcg tablet 1/23/2024 Self Yes   Sig: Take by mouth once daily.   docosahexaenoic acid/epa (FISH OIL ORAL) 1/28/2024 Self Yes   Sig: Take by mouth.   flaxseed oiL oil 1/28/2024 Self Yes   levothyroxine (Synthroid, Levoxyl) 200 mcg tablet 1/29/2024 Self Yes   Sig: Take 1 tablet (200 mcg) by mouth once daily in the morning. Take before meals.   lisinopril 10 mg tablet 1/29/2024 Self Yes   Sig: Take 1 tablet (10 mg) by mouth once daily.   metFORMIN (Glucophage) 500 mg tablet 1/29/2024 Self Yes   Sig: Take 1 tablet (500 mg) by mouth. Take 1 or 2 tablets daily at bedtime. Do not crush, chew, or split.   pimecrolimus (Elidel) 1 % cream 1/28/2024 Self Yes   Sig: Apply 1 Application topically once daily. To face   tirzepatide (Mounjaro) 10 mg/0.5 mL pen injector 1/19/2024 Self Yes   Sig: Inject 10 mg under the skin. Takes one  time weekly      Facility-Administered Medications: None        The list below reflects the updated allergy list. Please review each documented allergy for additional clarification and justification.  Allergies  Reviewed by Nelly Botello [facility] on 1/30/2024        Severity Reactions Comments    Ciprofloxacin Not Specified Hives, Other Rapid heart rate, fainted    Nsaids (non-steroidal Anti-inflammatory Drug) Not Specified Hives             M2B service not offered prior to surgery, please reassess prior to patient discharge if Meds to Beds is desired.    Sources used to complete the med history include: Patient interview, OARRS, Care Everywhere, medication fill history.    Below are additional concerns with the patient's PTA list.  None to note.    Nelly Botello [facility]   Transitions of Care Pharmacist   Meds Ambulatory and Retail Services  Please reach out via Secure Chat for questions, or if no response call [facility] or [facility]

## 2024-01-31 ENCOUNTER — APPOINTMENT (OUTPATIENT)
Dept: RADIOLOGY | Facility: HOSPITAL | Age: 44
DRG: 165 | End: 2024-01-31
Payer: COMMERCIAL

## 2024-01-31 VITALS
OXYGEN SATURATION: 98 % | HEIGHT: 70 IN | WEIGHT: 228 LBS | DIASTOLIC BLOOD PRESSURE: 77 MMHG | RESPIRATION RATE: 18 BRPM | HEART RATE: 77 BPM | TEMPERATURE: 98.1 F | BODY MASS INDEX: 32.64 KG/M2 | SYSTOLIC BLOOD PRESSURE: 129 MMHG

## 2024-01-31 LAB
ANION GAP SERPL CALC-SCNC: 11 MMOL/L (ref 10–20)
BUN SERPL-MCNC: 8 MG/DL (ref 6–23)
CALCIUM SERPL-MCNC: 9.5 MG/DL (ref 8.6–10.6)
CHLORIDE SERPL-SCNC: 100 MMOL/L (ref 98–107)
CO2 SERPL-SCNC: 31 MMOL/L (ref 21–32)
CREAT SERPL-MCNC: 0.8 MG/DL (ref 0.5–1.3)
EGFRCR SERPLBLD CKD-EPI 2021: >90 ML/MIN/1.73M*2
ERYTHROCYTE [DISTWIDTH] IN BLOOD BY AUTOMATED COUNT: 12.8 % (ref 11.5–14.5)
GLUCOSE BLD MANUAL STRIP-MCNC: 125 MG/DL (ref 74–99)
GLUCOSE BLD MANUAL STRIP-MCNC: 152 MG/DL (ref 74–99)
GLUCOSE SERPL-MCNC: 108 MG/DL (ref 74–99)
HCT VFR BLD AUTO: 39.6 % (ref 41–52)
HGB BLD-MCNC: 12.9 G/DL (ref 13.5–17.5)
MAGNESIUM SERPL-MCNC: 2.23 MG/DL (ref 1.6–2.4)
MCH RBC QN AUTO: 28.3 PG (ref 26–34)
MCHC RBC AUTO-ENTMCNC: 32.6 G/DL (ref 32–36)
MCV RBC AUTO: 87 FL (ref 80–100)
NRBC BLD-RTO: 0 /100 WBCS (ref 0–0)
PLATELET # BLD AUTO: 308 X10*3/UL (ref 150–450)
POTASSIUM SERPL-SCNC: 3.9 MMOL/L (ref 3.5–5.3)
RBC # BLD AUTO: 4.56 X10*6/UL (ref 4.5–5.9)
SODIUM SERPL-SCNC: 138 MMOL/L (ref 136–145)
WBC # BLD AUTO: 7.9 X10*3/UL (ref 4.4–11.3)

## 2024-01-31 PROCEDURE — 80048 BASIC METABOLIC PNL TOTAL CA: CPT | Performed by: PHYSICIAN ASSISTANT

## 2024-01-31 PROCEDURE — 71045 X-RAY EXAM CHEST 1 VIEW: CPT

## 2024-01-31 PROCEDURE — 2500000004 HC RX 250 GENERAL PHARMACY W/ HCPCS (ALT 636 FOR OP/ED): Performed by: PHYSICIAN ASSISTANT

## 2024-01-31 PROCEDURE — 71045 X-RAY EXAM CHEST 1 VIEW: CPT | Performed by: RADIOLOGY

## 2024-01-31 PROCEDURE — 82947 ASSAY GLUCOSE BLOOD QUANT: CPT

## 2024-01-31 PROCEDURE — 99232 SBSQ HOSP IP/OBS MODERATE 35: CPT | Performed by: PHYSICIAN ASSISTANT

## 2024-01-31 PROCEDURE — 2500000001 HC RX 250 WO HCPCS SELF ADMINISTERED DRUGS (ALT 637 FOR MEDICARE OP): Performed by: PHYSICIAN ASSISTANT

## 2024-01-31 PROCEDURE — 36415 COLL VENOUS BLD VENIPUNCTURE: CPT | Performed by: PHYSICIAN ASSISTANT

## 2024-01-31 PROCEDURE — 83735 ASSAY OF MAGNESIUM: CPT | Performed by: PHYSICIAN ASSISTANT

## 2024-01-31 PROCEDURE — 85027 COMPLETE CBC AUTOMATED: CPT | Performed by: PHYSICIAN ASSISTANT

## 2024-01-31 PROCEDURE — 2500000004 HC RX 250 GENERAL PHARMACY W/ HCPCS (ALT 636 FOR OP/ED)

## 2024-01-31 RX ORDER — OXYCODONE HYDROCHLORIDE 5 MG/1
5 TABLET ORAL EVERY 4 HOURS PRN
Status: DISCONTINUED | OUTPATIENT
Start: 2024-01-31 | End: 2024-01-31 | Stop reason: HOSPADM

## 2024-01-31 RX ORDER — OXYCODONE HYDROCHLORIDE 5 MG/1
5 TABLET ORAL EVERY 6 HOURS PRN
Qty: 15 TABLET | Refills: 0 | Status: SHIPPED | OUTPATIENT
Start: 2024-01-31 | End: 2024-02-23 | Stop reason: HOSPADM

## 2024-01-31 RX ORDER — AMOXICILLIN AND CLAVULANATE POTASSIUM 875; 125 MG/1; MG/1
875 TABLET, FILM COATED ORAL EVERY 12 HOURS SCHEDULED
Qty: 12 TABLET | Refills: 0 | Status: SHIPPED | OUTPATIENT
Start: 2024-01-31 | End: 2024-02-08 | Stop reason: ALTCHOICE

## 2024-01-31 RX ORDER — OXYCODONE HYDROCHLORIDE 5 MG/1
10 TABLET ORAL EVERY 4 HOURS PRN
Status: DISCONTINUED | OUTPATIENT
Start: 2024-01-31 | End: 2024-01-31 | Stop reason: HOSPADM

## 2024-01-31 RX ORDER — POTASSIUM CHLORIDE 750 MG/1
10 TABLET, FILM COATED, EXTENDED RELEASE ORAL ONCE
Status: COMPLETED | OUTPATIENT
Start: 2024-01-31 | End: 2024-01-31

## 2024-01-31 RX ORDER — HYDROMORPHONE HYDROCHLORIDE 1 MG/ML
0.2 INJECTION, SOLUTION INTRAMUSCULAR; INTRAVENOUS; SUBCUTANEOUS ONCE
Status: COMPLETED | OUTPATIENT
Start: 2024-01-31 | End: 2024-01-31

## 2024-01-31 RX ORDER — ACETAMINOPHEN 325 MG/1
650 TABLET ORAL EVERY 4 HOURS PRN
Qty: 30 TABLET | Refills: 0
Start: 2024-01-31

## 2024-01-31 RX ADMIN — HYDROMORPHONE HYDROCHLORIDE 0.2 MG: 1 INJECTION, SOLUTION INTRAMUSCULAR; INTRAVENOUS; SUBCUTANEOUS at 02:20

## 2024-01-31 RX ADMIN — BUPROPION HYDROCHLORIDE 300 MG: 150 TABLET, FILM COATED, EXTENDED RELEASE ORAL at 08:46

## 2024-01-31 RX ADMIN — HEPARIN SODIUM 5000 UNITS: 5000 INJECTION INTRAVENOUS; SUBCUTANEOUS at 10:00

## 2024-01-31 RX ADMIN — ATORVASTATIN CALCIUM 20 MG: 20 TABLET, FILM COATED ORAL at 08:50

## 2024-01-31 RX ADMIN — ACETAMINOPHEN 650 MG: 325 TABLET ORAL at 02:02

## 2024-01-31 RX ADMIN — INSULIN LISPRO 1 UNITS: 100 INJECTION, SOLUTION INTRAVENOUS; SUBCUTANEOUS at 13:00

## 2024-01-31 RX ADMIN — HEPARIN SODIUM 5000 UNITS: 5000 INJECTION INTRAVENOUS; SUBCUTANEOUS at 02:00

## 2024-01-31 RX ADMIN — LEVOTHYROXINE SODIUM 200 MCG: 0.2 TABLET ORAL at 06:25

## 2024-01-31 RX ADMIN — OXYCODONE HYDROCHLORIDE 10 MG: 5 TABLET ORAL at 08:55

## 2024-01-31 RX ADMIN — SENNOSIDES AND DOCUSATE SODIUM 2 TABLET: 8.6; 5 TABLET ORAL at 08:49

## 2024-01-31 RX ADMIN — AMOXICILLIN AND CLAVULANATE POTASSIUM 875 MG: 875; 125 TABLET, FILM COATED ORAL at 08:45

## 2024-01-31 RX ADMIN — OXYCODONE HYDROCHLORIDE 5 MG: 5 TABLET ORAL at 14:00

## 2024-01-31 RX ADMIN — DEXTROAMPHETAMINE SACCHARATE, AMPHETAMINE ASPARTATE, DEXTROAMPHETAMINE SULFATE, AND AMPHETAMINE SULFATE 15 MG: 2.5; 2.5; 2.5; 2.5 TABLET ORAL at 13:18

## 2024-01-31 RX ADMIN — POTASSIUM CHLORIDE 10 MEQ: 750 TABLET, FILM COATED, EXTENDED RELEASE ORAL at 10:56

## 2024-01-31 ASSESSMENT — PAIN SCALES - GENERAL
PAINLEVEL_OUTOF10: 6
PAINLEVEL_OUTOF10: 7
PAINLEVEL_OUTOF10: 0 - NO PAIN
PAINLEVEL_OUTOF10: 8
PAINLEVEL_OUTOF10: 9

## 2024-01-31 ASSESSMENT — COGNITIVE AND FUNCTIONAL STATUS - GENERAL
CLIMB 3 TO 5 STEPS WITH RAILING: A LOT
MOVING TO AND FROM BED TO CHAIR: A LITTLE
TURNING FROM BACK TO SIDE WHILE IN FLAT BAD: A LITTLE
STANDING UP FROM CHAIR USING ARMS: A LITTLE
PERSONAL GROOMING: A LITTLE
DRESSING REGULAR UPPER BODY CLOTHING: A LITTLE
WALKING IN HOSPITAL ROOM: A LITTLE
DAILY ACTIVITIY SCORE: 19
DRESSING REGULAR LOWER BODY CLOTHING: A LITTLE
MOVING FROM LYING ON BACK TO SITTING ON SIDE OF FLAT BED WITH BEDRAILS: A LITTLE
TOILETING: A LITTLE
MOBILITY SCORE: 17
HELP NEEDED FOR BATHING: A LITTLE

## 2024-01-31 ASSESSMENT — PAIN DESCRIPTION - ORIENTATION: ORIENTATION: RIGHT

## 2024-01-31 ASSESSMENT — PAIN - FUNCTIONAL ASSESSMENT
PAIN_FUNCTIONAL_ASSESSMENT: 0-10

## 2024-01-31 ASSESSMENT — PAIN DESCRIPTION - LOCATION
LOCATION: CHEST
LOCATION: CHEST

## 2024-01-31 NOTE — PROGRESS NOTES
"Dk Alas is a 43 y.o. male on day 1 of admission presenting with Mediastinal mass.  Met with patient to introduce myself, role and discuss discharge planning.  Patient lives with brother.  Patient is independent in all adl's.  Requires no assist devices for ambulation.  Patient denies active home care or home care needs.  Patient drives and denies any issues with making follow up appointments or getting his medications.  Patient expressed no questions and no social work concerns.  Will continue to monitor patient for all home going needs.    PCP:  Edward Hopkins  Pharmacy:[facility]  Home Care:  N/A  DME:  N/A      Physical Exam    Last Recorded Vitals  Blood pressure 129/77, pulse 77, temperature 36.7 °C (98.1 °F), temperature source Temporal, resp. rate 18, height 1.778 m (5' 10\"), weight 103 kg (228 lb), SpO2 98 %.  Intake/Output last 3 Shifts:  I/O last 3 completed shifts:  In: 1182 (11.4 mL/kg) [P.O.:780; I.V.:402 (3.9 mL/kg)]  Out: 2547 (24.6 mL/kg) [Urine:2425 (0.7 mL/kg/hr); Chest Tube:122]  Weight: 103.4 kg       Assessment/Plan   Principal Problem:    Mediastinal mass            Tuan Woody RN      "

## 2024-01-31 NOTE — PROGRESS NOTES
Dk Alas is a 43 y.o. male with a past medical history of ADHD, anxiety, depression, anemia, former smoking, hypothyroidism, HTN, HLD, DM2, obstructive jaundice s/p ERCP with stent placement in the common bile duct, and a posterior mediastinal mass who is on day 1 of admission now s/p a bronchoscopy, EGD, robotic assisted posterior mediastinal mass resection, robotic RLL wedge resection, and intercostal nerve block on 1/30/24 with Dr. Benitez.    Subjective   Pt has minimal concerns this morning. One episode of breakthrough pain overnight necessitating a one time dose of IV Dilaudid 0.2mg. Otherwise, he denies shortness of breath, fevers, chills, sweating, nausea, or vomiting. Voiding spontaneously s/p surgery.     Objective     Physical Exam  Constitutional:       General: He is not in acute distress.     Comments: Oriented x3, resting comfortably in bed   HENT:      Head: Normocephalic and atraumatic.   Neck:      Comments: No JVD or tracheal deviation  Cardiovascular:      Comments: Regular rate and rhythm on telemetry   Pulmonary:      Comments: No accessory muscle use to breathe or crepitus appreciated. On 2L/min nasal cannula. One right chest tube to atrium and maintained to waterseal. ~130ml of serosanguinous output in the past 24 hours. No air leak appreciated.   Abdominal:      General: There is no distension.      Palpations: Abdomen is soft.      Tenderness: There is no abdominal tenderness. There is no guarding or rebound.   Musculoskeletal:         General: No tenderness.      Right lower leg: No edema.      Left lower leg: No edema.   Skin:     Comments: Warm and dry. Surgical incisions without surrounding erythema, edema, or exudates. Occlusive dressing intact around chest tube site without strikethrough.    Neurological:      Mental Status: He is alert.   Psychiatric:      Comments: Appropriate mood and behavior       Last Recorded Vitals  Blood pressure 144/79, pulse 61, temperature 36.8 °C (98.2  "°F), temperature source Temporal, resp. rate 16, height 1.778 m (5' 10\"), weight 103 kg (228 lb), SpO2 97 %.  Intake/Output last 3 Shifts:  I/O last 3 completed shifts:  In: 1182 (11.4 mL/kg) [P.O.:780; I.V.:402 (3.9 mL/kg)]  Out: 2547 (24.6 mL/kg) [Urine:2425 (0.7 mL/kg/hr); Chest Tube:122]  Weight: 103.4 kg     Relevant Results  Scheduled medications  amoxicillin-pot clavulanate, 875 mg, oral, q12h DARLING  amphetamine-dextroamphetamine, 15 mg, oral, q AM  atorvastatin, 20 mg, oral, Daily  buPROPion XL, 300 mg, oral, Daily  heparin (porcine), 5,000 Units, subcutaneous, q8h  insulin lispro, 0-5 Units, subcutaneous, TID with meals  levothyroxine, 200 mcg, oral, Daily before breakfast  midazolam, , ,   sennosides-docusate sodium, 2 tablet, oral, BID      Continuous medications     PRN medications  PRN medications: acetaminophen, dextrose 10 % in water (D10W), dextrose, glucagon, ipratropium-albuteroL, midazolam, naloxone, ondansetron, oxyCODONE, oxyCODONE, oxygen     Results for orders placed or performed during the hospital encounter of 01/30/24 (from the past 24 hour(s))   POCT GLUCOSE   Result Value Ref Range    POCT Glucose 216 (H) 74 - 99 mg/dL   POCT GLUCOSE   Result Value Ref Range    POCT Glucose 171 (H) 74 - 99 mg/dL   POCT GLUCOSE   Result Value Ref Range    POCT Glucose 156 (H) 74 - 99 mg/dL   POCT GLUCOSE   Result Value Ref Range    POCT Glucose 154 (H) 74 - 99 mg/dL   CBC   Result Value Ref Range    WBC 7.9 4.4 - 11.3 x10*3/uL    nRBC 0.0 0.0 - 0.0 /100 WBCs    RBC 4.56 4.50 - 5.90 x10*6/uL    Hemoglobin 12.9 (L) 13.5 - 17.5 g/dL    Hematocrit 39.6 (L) 41.0 - 52.0 %    MCV 87 80 - 100 fL    MCH 28.3 26.0 - 34.0 pg    MCHC 32.6 32.0 - 36.0 g/dL    RDW 12.8 11.5 - 14.5 %    Platelets 308 150 - 450 x10*3/uL   Basic metabolic panel   Result Value Ref Range    Glucose 108 (H) 74 - 99 mg/dL    Sodium 138 136 - 145 mmol/L    Potassium 3.9 3.5 - 5.3 mmol/L    Chloride 100 98 - 107 mmol/L    Bicarbonate 31 21 - 32 " mmol/L    Anion Gap 11 10 - 20 mmol/L    Urea Nitrogen 8 6 - 23 mg/dL    Creatinine 0.80 0.50 - 1.30 mg/dL    eGFR >90 >60 mL/min/1.73m*2    Calcium 9.5 8.6 - 10.6 mg/dL   Magnesium   Result Value Ref Range    Magnesium 2.23 1.60 - 2.40 mg/dL   POCT GLUCOSE   Result Value Ref Range    POCT Glucose 125 (H) 74 - 99 mg/dL      XR chest 1 view    Result Date: 1/30/2024  Interpreted By:  Husam Bush and Ritchie Brandon STUDY: XR CHEST 1 VIEW;  1/30/2024 11:08 am   INDICATION: Signs/Symptoms:s/p surgery, upright please.   COMPARISON: Chest x-ray 11/26/2023   ACCESSION NUMBER(S): [id number]   ORDERING CLINICIAN: MILAN MCMANUS   FINDINGS: AP radiograph of the chest was provided.   Right-sided chest tube with the distal tip projecting over the right lung apex.   CARDIOMEDIASTINAL SILHOUETTE: Cardiomediastinal silhouette is normal in size and configuration.   LUNGS: Expiratory radiograph causing bronchovascular crowding/prominence of the bilateral interstitial markings. There are postsurgical changes of right lower lobe/posterior right mediastinal mass resection. Mildly increased right retrocardiac consolidation/consolidation of the right lower lung zone. Findings likely represent atelectasis. No evidence of pleural effusion or pneumothorax.   ABDOMEN: No remarkable upper abdominal findings.   BONES: No acute osseous changes.       1.  Postsurgical changes of right lower lobe/posterior right mediastinal mass resection. There is associated increased right lower lung zone opacity likely representing focal atelectasis. 2. Expiratory radiograph causing bronchovascular crowding/prominence of the bilateral interstitial markings.   I personally reviewed the images/study and I agree with the findings as stated by resident Kulwinder Koroma. This study was interpreted at [facility], [place], [place].   MACRO: None   Signed by: Husam Bush 1/30/2024 12:40 PM Dictation workstation:    [id number]      Assessment/Plan   Principal Problem:    Mediastinal mass  Dk Alas is a 43 y.o. male with a past medical history of ADHD, anxiety, depression, anemia, former smoking, hypothyroidism, HTN, HLD, DM2, obstructive jaundice s/p ERCP with stent placement in the common bile duct, and a posterior mediastinal mass who is on day 1 of admission now s/p a bronchoscopy, EGD, robotic assisted posterior mediastinal mass resection, robotic RLL wedge resection, and intercostal nerve block on 1/30/24 with Dr. Benitez. The mediastinal mass was consistent with an infected bronchogenic cyst which will be treated with 7 days of Augmentin per Dr. Benitez, and the right lower lung wedge resection was consistent with a granuloma. Postoperatively, the patient maintained a single right chest tube. Chest tube with minimal output and no air leak on POD#1. Chest tube removed without complications, and an occlusive dressing was applied. A post pull CXR is pending.     Plan:     Neuro: Acute postop pain  -Out of bed to chair throughout the day  -Encourage ambulation as tolerated  -Discontinue PCA  -Begin oral regimen of pain control with oxycodone and Tylenol, monitor effectiveness, adjust dose as needed, OARRs checked for discharge planning, overdose risk score of 380.     Psych: History of anxiety, depression, and ADHD. On bupropion, buspirone, and Adderall at home.   -Continue home bupropion and Adderall   -Home Buspar if needed     Cardiac: History of HTN and HLD, on lisinopril and atorvastatin at home  -Continue telemetry  -Vital signs every four hours  -Replace electrolytes as needed, goal Mg>2 and K>4. Hypokalemia today, replaced.   -Continue home atorvastatin   -Hold home lisinopril in the acute postop period, anticipate resuming on discharge    Pulm: History of a  posterior mediastinal mass. Now s/p a bronchoscopy, EGD, robotic assisted posterior mediastinal mass resection, robotic RLL wedge resection, and intercostal  nerve block on 1/30/24 with Dr. Benitez  -Encourage incentive spirometer use every hour  -Continue pulmonary hygiene  -Wean oxygen as tolerated (currently on 2 L/min nasal cannula)  -Right chest tube with minimal output and no air leak on POD#1. Chest tube removed without complications, and an occlusive dressing was applied. A post pull CXR is pending.   -Daily CXR while hospitalized   -Nebulizers as needed for shortness of breath     GI: History of obstructive jaundice s/p ERCP with stent placement   -Begin diabetic diet  -Zofran available as needed for nausea  -Bowel regimen available for constipation secondary to pain medication    Endocrine: History of T2DM and hypothyroidism, on metformin, Mounjaro, and levothyroxine at home.   -Continue routine accuchecks  -Continue sliding scale insulin in the acute postop period   -Hypoglycemia protocol available for activation as needed  -Hold home metformin in the acute postop period, likely restart on discharge   -Continue home levothyroxine     Renal:   -Spontaneously voiding   -Monitor I&Os  -Monitor electrolytes with routine BMPs    ID: Afebrile, no leukocytosis   -Continue to trend daily temperatures and CBC to monitor WBC count for signs of infection   -Monitor surgical sites for signs of infection   -Preoperative Ancef given intra-op, plan for Augmentin x7 days per Dr. Benitez due to resection of infected bronchogenic cyst on 1/30    Heme:   -Continue to monitor for signs/symptoms of postop bleeding   -Daily CBC  -Continue subcutaneous heparin and SCDs for DVT prophylaxis     Dispo:   -Plan for discharge home pending postop CXR and medically stable   -Continue to assess for discharge needs    Pt seen and evaluated. Pt discussed with attending physician Dr. Benitez.     I spent >30 minutes in the professional and overall care of this patient. Pt educated on postoperative activity restrictions, wound care, and pain management. All questions answered to his satisfaction  until there were none.     Ama Dodson PA-C

## 2024-01-31 NOTE — DISCHARGE SUMMARY
Discharge Diagnosis  Mediastinal mass    Issues Requiring Follow-Up  -Surgical pathology  -Finish antibiotic course    Test Results Pending At Discharge  Pending Labs       Order Current Status    Surgical Pathology Exam In process          Hospital Course  Dk Alas is a 43 y.o. male with a past medical history of ADHD, anxiety, depression, anemia, former smoking, hypothyroidism, HTN, HLD, DM2, obstructive jaundice s/p ERCP with stent placement in the common bile duct, and a posterior mediastinal mass who is s/p a bronchoscopy, EGD, robotic assisted posterior mediastinal mass resection, robotic RLL wedge resection, and intercostal nerve block on 1/30/24 with Dr. Benitez. The mediastinal mass was consistent with an infected bronchogenic cyst which will be treated with 7 days of Augmentin per Dr. Benitez, and the right lower lung wedge resection was consistent with a granuloma. Postoperatively, the patient maintained a single right chest tube. Chest tube with minimal output and no air leak on POD#1. Chest tube removed without complications, and an occlusive dressing was applied. A post pull CXR is stable.     On POD#1, the patient was deemed fit for discharge. At the time of discharge, the patient was ambulating ad jaime, tolerating a regular diabetic diet, and pain was well-controlled on an oral regimen. The patient was educated on postoperative activity restrictions, wound care, and pain management. The patient will be discharged home with his family in stable condition. He will follow up with Dr. Benitez in the outpatient setting in two weeks.      Pertinent Physical Exam At Time of Discharge  Physical Exam  Constitutional:       General: He is not in acute distress.     Comments: Oriented x3, resting comfortably in bed   HENT:      Head: Normocephalic and atraumatic.   Neck:      Comments: No JVD or tracheal deviation  Cardiovascular:      Comments: Regular rate and rhythm on telemetry   Pulmonary:      Comments: No  accessory muscle use to breathe or crepitus appreciated. On room air.   Abdominal:      General: There is no distension.      Palpations: Abdomen is soft.      Tenderness: There is no abdominal tenderness. There is no guarding or rebound.   Musculoskeletal:         General: No tenderness.      Right lower leg: No edema.      Left lower leg: No edema.   Skin:     Comments: Warm and dry. Surgical incisions without surrounding erythema, edema, or exudates. Occlusive dressing intact around chest tube site without strikethrough.    Neurological:      Mental Status: He is alert.   Psychiatric:      Comments: Appropriate mood and behavior     Home Medications     Medication List      START taking these medications     acetaminophen 325 mg tablet; Commonly known as: Tylenol; Take 2 tablets   (650 mg) by mouth every 4 hours if needed for mild pain (1 - 3) or fever   (temp greater than 38.0 C).   amoxicillin-pot clavulanate 875-125 mg tablet; Commonly known as:   Augmentin; Take 1 tablet (875 mg) by mouth every 12 hours.   oxyCODONE 5 mg immediate release tablet; Commonly known as: Roxicodone;   Take 1 tablet (5 mg) by mouth every 6 hours if needed for moderate pain (4   - 6).     CONTINUE taking these medications     amphetamine-dextroamphetamine 15 mg tablet; Commonly known as: Adderall   atorvastatin 20 mg tablet; Commonly known as: Lipitor   buPROPion  mg 24 hr tablet; Commonly known as: Wellbutrin XL   busPIRone 5 mg tablet; Commonly known as: Buspar   cyanocobalamin 250 mcg tablet; Commonly known as: Vitamin B-12   FISH OIL ORAL   flaxseed oiL oil   levothyroxine 200 mcg tablet; Commonly known as: Synthroid, Levoxyl   lisinopril 10 mg tablet   metFORMIN 500 mg tablet; Commonly known as: Glucophage   Mounjaro 10 mg/0.5 mL pen injector; Generic drug: tirzepatide   pimecrolimus 1 % cream; Commonly known as: Elidel     STOP taking these medications     chlorhexidine 4 % external liquid; Commonly known as: Hibiclens        Outpatient Follow-Up  Future Appointments   Date Time Provider Department Center   2/8/2024 10:45 AM Tyrone Benitez MD [id number]       Ama Dodson PA-C

## 2024-01-31 NOTE — NURSING NOTE
Patient preparing for discharge. PIV removed, patient education provided, all questions answered, all belongings sent home with patient. Patient discharged home with brother.

## 2024-02-07 NOTE — PROGRESS NOTES
Subjective   Dk Alas  is a 43 y.o. male with a pmhx of DM2, HTN, hypothyroidism and posterior mediastinal mass s/p robotic assisted mediastinal mass resection and RLL wedge resection who presents today for postop follow up. He is doing well. Pain is tolerable.    There is no significant change in patient's past medical history, past surgical history, social history, family history, or review of systems since last visit.     Objective   There were no vitals taken for this visit.  Physical Exam  Constitutional:       General: He is not in acute distress.     Appearance: Normal appearance. He is not ill-appearing, toxic-appearing or diaphoretic.   HENT:      Head: Normocephalic and atraumatic.      Mouth/Throat:      Mouth: Mucous membranes are moist.      Pharynx: Oropharynx is clear.   Eyes:      General: No scleral icterus.     Extraocular Movements: Extraocular movements intact.      Conjunctiva/sclera: Conjunctivae normal.      Pupils: Pupils are equal, round, and reactive to light.   Cardiovascular:      Rate and Rhythm: Normal rate and regular rhythm.      Pulses: Normal pulses.      Heart sounds: Normal heart sounds.   Pulmonary:      Effort: Pulmonary effort is normal. No respiratory distress.      Breath sounds: Normal breath sounds. No stridor. No wheezing, rhonchi or rales.      Comments: Incision c/d/I.   Chest:      Chest wall: No tenderness.   Abdominal:      General: There is no distension.      Palpations: Abdomen is soft.      Tenderness: There is no abdominal tenderness. There is no guarding or rebound.   Musculoskeletal:         General: No swelling or tenderness. Normal range of motion.      Cervical back: Normal range of motion and neck supple. No rigidity or tenderness.      Right lower leg: No edema.      Left lower leg: No edema.   Skin:     General: Skin is warm and dry.      Coloration: Skin is not jaundiced.   Neurological:      General: No focal deficit present.      Mental Status: He is  alert and oriented to person, place, and time. Mental status is at baseline.   Psychiatric:         Mood and Affect: Mood normal.         Behavior: Behavior normal.         Diagnostic Review  I have reviewed CXR from today and compared it to CXR from 1/31/24. It showed clear lung field without PTX or effusion.  I reviewed the operative note. It showed RLL granuloma no malignancy on frozen. 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   The path report is still pending.       Assessment/Plan   Dk Alas  is doing well. I suspected it is infected bronchogenic cyst. I will wait for final pathology and call him with the results afterwards. I recommend follow up in as needed basis. He can return to work now.    Tyrone Benitez MD  Thoracic Surgeon  [facility]   of Medicine  [facility]  Office phone: [phone]  Fax: [phone]  Pager: [phone]

## 2024-02-08 ENCOUNTER — OFFICE VISIT (OUTPATIENT)
Dept: SURGERY | Facility: CLINIC | Age: 44
End: 2024-02-08
Payer: COMMERCIAL

## 2024-02-08 ENCOUNTER — HOSPITAL ENCOUNTER (OUTPATIENT)
Dept: RADIOLOGY | Facility: HOSPITAL | Age: 44
Discharge: HOME | End: 2024-02-08
Payer: COMMERCIAL

## 2024-02-08 VITALS
DIASTOLIC BLOOD PRESSURE: 73 MMHG | BODY MASS INDEX: 32.21 KG/M2 | HEART RATE: 72 BPM | HEIGHT: 70 IN | TEMPERATURE: 96.9 F | OXYGEN SATURATION: 97 % | WEIGHT: 225 LBS | SYSTOLIC BLOOD PRESSURE: 129 MMHG

## 2024-02-08 DIAGNOSIS — J98.59 MEDIASTINAL MASS: ICD-10-CM

## 2024-02-08 DIAGNOSIS — J98.59 MEDIASTINAL MASS: Primary | ICD-10-CM

## 2024-02-08 PROCEDURE — 3078F DIAST BP <80 MM HG: CPT | Performed by: STUDENT IN AN ORGANIZED HEALTH CARE EDUCATION/TRAINING PROGRAM

## 2024-02-08 PROCEDURE — 1036F TOBACCO NON-USER: CPT | Performed by: STUDENT IN AN ORGANIZED HEALTH CARE EDUCATION/TRAINING PROGRAM

## 2024-02-08 PROCEDURE — 71046 X-RAY EXAM CHEST 2 VIEWS: CPT

## 2024-02-08 PROCEDURE — 3074F SYST BP LT 130 MM HG: CPT | Performed by: STUDENT IN AN ORGANIZED HEALTH CARE EDUCATION/TRAINING PROGRAM

## 2024-02-08 PROCEDURE — 4010F ACE/ARB THERAPY RXD/TAKEN: CPT | Performed by: STUDENT IN AN ORGANIZED HEALTH CARE EDUCATION/TRAINING PROGRAM

## 2024-02-08 PROCEDURE — 99024 POSTOP FOLLOW-UP VISIT: CPT | Performed by: STUDENT IN AN ORGANIZED HEALTH CARE EDUCATION/TRAINING PROGRAM

## 2024-02-09 ENCOUNTER — TELEPHONE (OUTPATIENT)
Dept: CARDIOTHORACIC SURGERY | Facility: HOSPITAL | Age: 44
End: 2024-02-09
Payer: COMMERCIAL

## 2024-02-09 LAB
LABORATORY COMMENT REPORT: NORMAL
Lab: NORMAL
PATH REPORT.FINAL DX SPEC: NORMAL
PATH REPORT.GROSS SPEC: NORMAL
PATH REPORT.RELEVANT HX SPEC: NORMAL
PATH REPORT.TOTAL CANCER: NORMAL

## 2024-02-09 NOTE — TELEPHONE ENCOUNTER
I called patient about the path report.  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 They are consistent with infected bronchogenic cyst. He can follow-up with me in as-needed basis.    Tyrone Benitez MD  Thoracic Surgeon  [facility]   of Medicine  [facility]  Office phone: [phone]  Fax: [phone]  Pager: [phone]

## 2024-02-19 ENCOUNTER — APPOINTMENT (OUTPATIENT)
Dept: RADIOLOGY | Facility: HOSPITAL | Age: 44
End: 2024-02-19
Payer: COMMERCIAL

## 2024-02-19 ENCOUNTER — CLINICAL SUPPORT (OUTPATIENT)
Dept: EMERGENCY MEDICINE | Facility: HOSPITAL | Age: 44
End: 2024-02-19
Payer: COMMERCIAL

## 2024-02-19 ENCOUNTER — HOSPITAL ENCOUNTER (INPATIENT)
Facility: HOSPITAL | Age: 44
LOS: 4 days | Discharge: HOME | End: 2024-02-23
Attending: EMERGENCY MEDICINE | Admitting: SURGERY
Payer: COMMERCIAL

## 2024-02-19 DIAGNOSIS — K81.0 ACUTE CHOLECYSTITIS: Primary | ICD-10-CM

## 2024-02-19 LAB
ABO GROUP (TYPE) IN BLOOD: NORMAL
ALBUMIN SERPL BCP-MCNC: 3.9 G/DL (ref 3.4–5)
ALP SERPL-CCNC: 112 U/L (ref 33–120)
ALT SERPL W P-5'-P-CCNC: 18 U/L (ref 10–52)
ANION GAP BLDV CALCULATED.4IONS-SCNC: 9 MMOL/L (ref 10–25)
ANION GAP SERPL CALC-SCNC: 15 MMOL/L (ref 10–20)
ANTIBODY SCREEN: NORMAL
APTT PPP: 32 SECONDS (ref 27–38)
AST SERPL W P-5'-P-CCNC: 14 U/L (ref 9–39)
BASE EXCESS BLDV CALC-SCNC: 3.4 MMOL/L (ref -2–3)
BASOPHILS # BLD AUTO: 0.04 X10*3/UL (ref 0–0.1)
BASOPHILS NFR BLD AUTO: 0.3 %
BILIRUB SERPL-MCNC: 2.7 MG/DL (ref 0–1.2)
BODY TEMPERATURE: 37 DEGREES CELSIUS
BUN SERPL-MCNC: 11 MG/DL (ref 6–23)
CA-I BLDV-SCNC: 1.17 MMOL/L (ref 1.1–1.33)
CALCIUM SERPL-MCNC: 9.8 MG/DL (ref 8.6–10.6)
CHLORIDE BLDV-SCNC: 96 MMOL/L (ref 98–107)
CHLORIDE SERPL-SCNC: 97 MMOL/L (ref 98–107)
CO2 SERPL-SCNC: 25 MMOL/L (ref 21–32)
CREAT SERPL-MCNC: 0.93 MG/DL (ref 0.5–1.3)
EGFRCR SERPLBLD CKD-EPI 2021: >90 ML/MIN/1.73M*2
EOSINOPHIL # BLD AUTO: 0.03 X10*3/UL (ref 0–0.7)
EOSINOPHIL NFR BLD AUTO: 0.2 %
ERYTHROCYTE [DISTWIDTH] IN BLOOD BY AUTOMATED COUNT: 12.9 % (ref 11.5–14.5)
GLUCOSE BLDV-MCNC: 207 MG/DL (ref 74–99)
GLUCOSE SERPL-MCNC: 186 MG/DL (ref 74–99)
HCO3 BLDV-SCNC: 28.5 MMOL/L (ref 22–26)
HCT VFR BLD AUTO: 40 % (ref 41–52)
HCT VFR BLD EST: 41 % (ref 41–52)
HGB BLD-MCNC: 13.3 G/DL (ref 13.5–17.5)
HGB BLDV-MCNC: 13.7 G/DL (ref 13.5–17.5)
IMM GRANULOCYTES # BLD AUTO: 0.06 X10*3/UL (ref 0–0.7)
IMM GRANULOCYTES NFR BLD AUTO: 0.4 % (ref 0–0.9)
INHALED O2 CONCENTRATION: 21 %
INR PPP: 1.2 (ref 0.9–1.1)
LACTATE BLDV-SCNC: 1.1 MMOL/L (ref 0.4–2)
LIPASE SERPL-CCNC: 10 U/L (ref 9–82)
LYMPHOCYTES # BLD AUTO: 1.25 X10*3/UL (ref 1.2–4.8)
LYMPHOCYTES NFR BLD AUTO: 8 %
MCH RBC QN AUTO: 26.7 PG (ref 26–34)
MCHC RBC AUTO-ENTMCNC: 33.3 G/DL (ref 32–36)
MCV RBC AUTO: 80 FL (ref 80–100)
MONOCYTES # BLD AUTO: 1.53 X10*3/UL (ref 0.1–1)
MONOCYTES NFR BLD AUTO: 9.8 %
NEUTROPHILS # BLD AUTO: 12.64 X10*3/UL (ref 1.2–7.7)
NEUTROPHILS NFR BLD AUTO: 81.3 %
NRBC BLD-RTO: 0 /100 WBCS (ref 0–0)
OXYHGB MFR BLDV: 71.5 % (ref 45–75)
PCO2 BLDV: 44 MM HG (ref 41–51)
PH BLDV: 7.42 PH (ref 7.33–7.43)
PLATELET # BLD AUTO: 402 X10*3/UL (ref 150–450)
PO2 BLDV: 46 MM HG (ref 35–45)
POTASSIUM BLDV-SCNC: 4.9 MMOL/L (ref 3.5–5.3)
POTASSIUM SERPL-SCNC: 4.7 MMOL/L (ref 3.5–5.3)
PROT SERPL-MCNC: 7.3 G/DL (ref 6.4–8.2)
PROTHROMBIN TIME: 13.6 SECONDS (ref 9.8–12.8)
RBC # BLD AUTO: 4.98 X10*6/UL (ref 4.5–5.9)
RH FACTOR (ANTIGEN D): NORMAL
SAO2 % BLDV: 73 % (ref 45–75)
SODIUM BLDV-SCNC: 129 MMOL/L (ref 136–145)
SODIUM SERPL-SCNC: 132 MMOL/L (ref 136–145)
WBC # BLD AUTO: 15.6 X10*3/UL (ref 4.4–11.3)

## 2024-02-19 PROCEDURE — 84132 ASSAY OF SERUM POTASSIUM: CPT

## 2024-02-19 PROCEDURE — 36415 COLL VENOUS BLD VENIPUNCTURE: CPT

## 2024-02-19 PROCEDURE — 2550000001 HC RX 255 CONTRASTS: Performed by: SURGERY

## 2024-02-19 PROCEDURE — 1210000001 HC SEMI-PRIVATE ROOM DAILY

## 2024-02-19 PROCEDURE — A9575 INJ GADOTERATE MEGLUMI 0.1ML: HCPCS | Performed by: SURGERY

## 2024-02-19 PROCEDURE — 99223 1ST HOSP IP/OBS HIGH 75: CPT | Performed by: STUDENT IN AN ORGANIZED HEALTH CARE EDUCATION/TRAINING PROGRAM

## 2024-02-19 PROCEDURE — 96375 TX/PRO/DX INJ NEW DRUG ADDON: CPT | Mod: 59

## 2024-02-19 PROCEDURE — 2500000001 HC RX 250 WO HCPCS SELF ADMINISTERED DRUGS (ALT 637 FOR MEDICARE OP)

## 2024-02-19 PROCEDURE — 82330 ASSAY OF CALCIUM: CPT

## 2024-02-19 PROCEDURE — 99222 1ST HOSP IP/OBS MODERATE 55: CPT | Performed by: SURGERY

## 2024-02-19 PROCEDURE — 96375 TX/PRO/DX INJ NEW DRUG ADDON: CPT

## 2024-02-19 PROCEDURE — 2500000004 HC RX 250 GENERAL PHARMACY W/ HCPCS (ALT 636 FOR OP/ED): Mod: SE | Performed by: EMERGENCY MEDICINE

## 2024-02-19 PROCEDURE — 83690 ASSAY OF LIPASE: CPT

## 2024-02-19 PROCEDURE — 76705 ECHO EXAM OF ABDOMEN: CPT

## 2024-02-19 PROCEDURE — 2500000004 HC RX 250 GENERAL PHARMACY W/ HCPCS (ALT 636 FOR OP/ED)

## 2024-02-19 PROCEDURE — 85018 HEMOGLOBIN: CPT

## 2024-02-19 PROCEDURE — 2500000004 HC RX 250 GENERAL PHARMACY W/ HCPCS (ALT 636 FOR OP/ED): Mod: SE

## 2024-02-19 PROCEDURE — 96361 HYDRATE IV INFUSION ADD-ON: CPT

## 2024-02-19 PROCEDURE — 96365 THER/PROPH/DIAG IV INF INIT: CPT

## 2024-02-19 PROCEDURE — 86901 BLOOD TYPING SEROLOGIC RH(D): CPT

## 2024-02-19 PROCEDURE — 93005 ELECTROCARDIOGRAM TRACING: CPT

## 2024-02-19 PROCEDURE — 85610 PROTHROMBIN TIME: CPT

## 2024-02-19 PROCEDURE — 74182 MRI ABDOMEN W/CONTRAST: CPT

## 2024-02-19 PROCEDURE — 99285 EMERGENCY DEPT VISIT HI MDM: CPT | Mod: 25

## 2024-02-19 PROCEDURE — 99285 EMERGENCY DEPT VISIT HI MDM: CPT | Performed by: EMERGENCY MEDICINE

## 2024-02-19 PROCEDURE — 85025 COMPLETE CBC W/AUTO DIFF WBC: CPT

## 2024-02-19 PROCEDURE — 96376 TX/PRO/DX INJ SAME DRUG ADON: CPT

## 2024-02-19 PROCEDURE — 76705 ECHO EXAM OF ABDOMEN: CPT | Performed by: EMERGENCY MEDICINE

## 2024-02-19 PROCEDURE — 85730 THROMBOPLASTIN TIME PARTIAL: CPT

## 2024-02-19 PROCEDURE — 96365 THER/PROPH/DIAG IV INF INIT: CPT | Mod: 59

## 2024-02-19 RX ORDER — HYDROMORPHONE HYDROCHLORIDE 1 MG/ML
INJECTION, SOLUTION INTRAMUSCULAR; INTRAVENOUS; SUBCUTANEOUS
Status: COMPLETED
Start: 2024-02-19 | End: 2024-02-19

## 2024-02-19 RX ORDER — SODIUM CHLORIDE 9 MG/ML
125 INJECTION, SOLUTION INTRAVENOUS CONTINUOUS
Status: DISCONTINUED | OUTPATIENT
Start: 2024-02-19 | End: 2024-02-19

## 2024-02-19 RX ORDER — OXYCODONE HYDROCHLORIDE 5 MG/1
5 TABLET ORAL EVERY 4 HOURS PRN
Status: DISCONTINUED | OUTPATIENT
Start: 2024-02-19 | End: 2024-02-21

## 2024-02-19 RX ORDER — OXYCODONE HYDROCHLORIDE 5 MG/1
10 TABLET ORAL EVERY 4 HOURS PRN
Status: DISCONTINUED | OUTPATIENT
Start: 2024-02-19 | End: 2024-02-21

## 2024-02-19 RX ORDER — ONDANSETRON HYDROCHLORIDE 2 MG/ML
4 INJECTION, SOLUTION INTRAVENOUS EVERY 6 HOURS PRN
Status: DISCONTINUED | OUTPATIENT
Start: 2024-02-19 | End: 2024-02-23 | Stop reason: HOSPADM

## 2024-02-19 RX ORDER — GADOTERATE MEGLUMINE 376.9 MG/ML
20 INJECTION INTRAVENOUS
Status: COMPLETED | OUTPATIENT
Start: 2024-02-19 | End: 2024-02-19

## 2024-02-19 RX ORDER — HYDROMORPHONE HYDROCHLORIDE 1 MG/ML
0.5 INJECTION, SOLUTION INTRAMUSCULAR; INTRAVENOUS; SUBCUTANEOUS ONCE
Status: COMPLETED | OUTPATIENT
Start: 2024-02-19 | End: 2024-02-19

## 2024-02-19 RX ORDER — SODIUM CHLORIDE, SODIUM LACTATE, POTASSIUM CHLORIDE, CALCIUM CHLORIDE 600; 310; 30; 20 MG/100ML; MG/100ML; MG/100ML; MG/100ML
100 INJECTION, SOLUTION INTRAVENOUS CONTINUOUS
Status: DISCONTINUED | OUTPATIENT
Start: 2024-02-19 | End: 2024-02-20

## 2024-02-19 RX ORDER — ACETAMINOPHEN 325 MG/1
650 TABLET ORAL EVERY 6 HOURS
Status: DISCONTINUED | OUTPATIENT
Start: 2024-02-19 | End: 2024-02-23 | Stop reason: HOSPADM

## 2024-02-19 RX ORDER — POLYETHYLENE GLYCOL 3350 17 G/17G
17 POWDER, FOR SOLUTION ORAL DAILY
Status: DISCONTINUED | OUTPATIENT
Start: 2024-02-19 | End: 2024-02-23 | Stop reason: HOSPADM

## 2024-02-19 RX ORDER — HYDROMORPHONE HYDROCHLORIDE 1 MG/ML
0.2 INJECTION, SOLUTION INTRAMUSCULAR; INTRAVENOUS; SUBCUTANEOUS EVERY 4 HOURS PRN
Status: DISCONTINUED | OUTPATIENT
Start: 2024-02-19 | End: 2024-02-21

## 2024-02-19 RX ORDER — ENOXAPARIN SODIUM 100 MG/ML
40 INJECTION SUBCUTANEOUS EVERY 24 HOURS
Status: DISCONTINUED | OUTPATIENT
Start: 2024-02-19 | End: 2024-02-23 | Stop reason: HOSPADM

## 2024-02-19 RX ADMIN — ACETAMINOPHEN 650 MG: 325 TABLET ORAL at 13:42

## 2024-02-19 RX ADMIN — HYDROMORPHONE HYDROCHLORIDE 0.5 MG: 1 INJECTION, SOLUTION INTRAMUSCULAR; INTRAVENOUS; SUBCUTANEOUS at 08:01

## 2024-02-19 RX ADMIN — GADOTERATE MEGLUMINE 20 ML: 376.9 INJECTION INTRAVENOUS at 22:41

## 2024-02-19 RX ADMIN — HYDROMORPHONE HYDROCHLORIDE 0.2 MG: 1 INJECTION, SOLUTION INTRAMUSCULAR; INTRAVENOUS; SUBCUTANEOUS at 15:32

## 2024-02-19 RX ADMIN — PIPERACILLIN SODIUM AND TAZOBACTAM SODIUM 3.38 G: 3; .375 INJECTION, SOLUTION INTRAVENOUS at 14:59

## 2024-02-19 RX ADMIN — SODIUM CHLORIDE, POTASSIUM CHLORIDE, SODIUM LACTATE AND CALCIUM CHLORIDE 100 ML/HR: 600; 310; 30; 20 INJECTION, SOLUTION INTRAVENOUS at 13:42

## 2024-02-19 RX ADMIN — HYDROMORPHONE HYDROCHLORIDE 0.5 MG: 1 INJECTION, SOLUTION INTRAMUSCULAR; INTRAVENOUS; SUBCUTANEOUS at 11:59

## 2024-02-19 RX ADMIN — HYDROMORPHONE HYDROCHLORIDE 0.5 MG: 1 INJECTION, SOLUTION INTRAMUSCULAR; INTRAVENOUS; SUBCUTANEOUS at 10:03

## 2024-02-19 RX ADMIN — ENOXAPARIN SODIUM 40 MG: 100 INJECTION SUBCUTANEOUS at 13:42

## 2024-02-19 RX ADMIN — PIPERACILLIN SODIUM AND TAZOBACTAM SODIUM 3.38 G: 3; .375 INJECTION, SOLUTION INTRAVENOUS at 09:13

## 2024-02-19 RX ADMIN — ACETAMINOPHEN 650 MG: 325 TABLET ORAL at 18:55

## 2024-02-19 RX ADMIN — SODIUM CHLORIDE, POTASSIUM CHLORIDE, SODIUM LACTATE AND CALCIUM CHLORIDE 1000 ML: 600; 310; 30; 20 INJECTION, SOLUTION INTRAVENOUS at 07:59

## 2024-02-19 RX ADMIN — PIPERACILLIN SODIUM AND TAZOBACTAM SODIUM 3.38 G: 3; .375 INJECTION, SOLUTION INTRAVENOUS at 20:16

## 2024-02-19 RX ADMIN — OXYCODONE HYDROCHLORIDE 10 MG: 5 TABLET ORAL at 14:59

## 2024-02-19 RX ADMIN — OXYCODONE HYDROCHLORIDE 10 MG: 5 TABLET ORAL at 20:16

## 2024-02-19 ASSESSMENT — PAIN - FUNCTIONAL ASSESSMENT
PAIN_FUNCTIONAL_ASSESSMENT: 0-10

## 2024-02-19 ASSESSMENT — ENCOUNTER SYMPTOMS
ABDOMINAL PAIN: 1
FLANK PAIN: 1

## 2024-02-19 ASSESSMENT — PAIN SCALES - GENERAL
PAINLEVEL_OUTOF10: 9
PAINLEVEL_OUTOF10: 8
PAINLEVEL_OUTOF10: 9
PAINLEVEL_OUTOF10: 7
PAINLEVEL_OUTOF10: 4
PAINLEVEL_OUTOF10: 8

## 2024-02-19 ASSESSMENT — COLUMBIA-SUICIDE SEVERITY RATING SCALE - C-SSRS
6. HAVE YOU EVER DONE ANYTHING, STARTED TO DO ANYTHING, OR PREPARED TO DO ANYTHING TO END YOUR LIFE?: NO
1. IN THE PAST MONTH, HAVE YOU WISHED YOU WERE DEAD OR WISHED YOU COULD GO TO SLEEP AND NOT WAKE UP?: NO
2. HAVE YOU ACTUALLY HAD ANY THOUGHTS OF KILLING YOURSELF?: NO

## 2024-02-19 ASSESSMENT — PAIN DESCRIPTION - PROGRESSION: CLINICAL_PROGRESSION: GRADUALLY IMPROVING

## 2024-02-19 NOTE — ED TRIAGE NOTES
Pt transfer from [facility] for cholecystitis. Recently seen here for ERCP. A/Ox4 w/o cp sob or n/v. Skin warm dry and intact pink membranes. Pt recalls all events and follows al commands . Reps are equal and unlabored. MAEx4 w/o neuro defs

## 2024-02-19 NOTE — CONSULTS
[facility]  ACUTE CARE SURGERY - HISTORY AND PHYSICAL / CONSULT    Patient Name: Dk Alas  MRN: [id number]  Admit Date: 219  : 1980  AGE: 43 y.o.   GENDER: male  ==============================================================================  TODAY'S ASSESSMENT AND PLAN OF CARE:  Pt is a 42yo M with hx of cholelithiasis and CBD stenosis s/p stent with interval resolution and removal of stent 1 month ago who presents with 3 days of RUQ and epigastric pain with elevated T.bili to 2.7, without fever or AMS, and positive Mcelroy's sign. On CT, he appears to have cholecystitis and non-dilated CBD.     Admit to [facility]; discussing lap possible open cholecystectomy with intra-op cholangiogram tomorrow    - scheduled PO tylenol, PRN oxy, Dilaudid breakthrough  - IS, ambulate TID  - NPO, LR 100cc/hr  - GI consult for evaluation for ERCP  - DVT ppx with lovenox 40 daily  - zosyn 3.375 q6hr for empiric abx for cholecystitis     Discussed with Attending, Dr. Leroy.     Rocky Mae MD   Pager [phone]     ==============================================================================  CHIEF COMPLAINT/REASON FOR CONSULT:  Pt is a 42yo M transferred from [facility] for concern for cholecystitis. Pt initially presented to ED 2023 with epigastric pain that started within 1 hr of food consumption and was constant and worsening. T.bili at the time was 7.1. RUQ US showed cholelithiasis, GB wall 2mm, CBD 4 mm. ERCP was done to place stent for CBD stricture. Pt reported feeling immediate relief of his pain. Part of his workup included a CT chest which showed a posterior mediastinal mass, for which he underwent robotic-assisted posterior mediastinal mass resection and RLL wedge resection (Granuloma) on  with Dr. Benitez. Repeat ERCP 2024 shows resolved biliary stenosis and removal of CBD stent at which time T.bili was 1.1. Pt now presents with 3 days of the same epigastric pain that he had prior to  ERCP stent placement.     Reports nausea, emesis x1, not bloody not bilious. Denies fevers, chills. Last BM 2 days ago which is abnormal from his baseline of a few BM daily. Not passing gas.     In ED, pt is not tachycardic, or hypotensive but has leukocytosis to 15.6, T.bili to 2.7.     PAST MEDICAL HISTORY:   PMH: ADHD, hypothyroidism, HTN, DM2 since , weight loss 10 lbs since ERCP.      PSH: see above   Past Surgical History:   Procedure Laterality Date    BILE DUCT STENT PLACEMENT      Stent placed 2023 for narrowing of CBD    CT ANGIO CHEST FOR PULMONARY EMBOLISM      CT scan on 23    CT ANGIO CORONARY ART WITH HEARTFLOW IF SCORE >30%  10/26/2021    CT ANGIO CORONARY ART WITH HEARTFLOW IF SCORE >30% 10/26/2021     FH:   Family History   Problem Relation Name Age of Onset    Diabetes Mother      Hypertension Mother      Heart attack Mother      Heart attack Maternal Grandmother       SOCIAL HISTORY:    Smokin pack-years; quit 3 years ago   Social History     Tobacco Use   Smoking Status Former    Types: Cigarettes    Quit date:     Years since quittin.1   Smokeless Tobacco Never       Alcohol:    Social History     Substance and Sexual Activity   Alcohol Use Never           MEDICATIONS:   Prior to Admission medications    Medication Sig Start Date End Date Taking? Authorizing Provider   acetaminophen (Tylenol) 325 mg tablet Take 2 tablets (650 mg) by mouth every 4 hours if needed for mild pain (1 - 3) or fever (temp greater than 38.0 C). 24   Ama Dodson PA-C   amphetamine-dextroamphetamine (Adderall) 15 mg tablet Take 1 tablet (15 mg) by mouth once daily in the morning. Do not crush or chew.    Historical Provider, MD   atorvastatin (Lipitor) 20 mg tablet Take 1 tablet (20 mg) by mouth once daily.    Historical Provider, MD   buPROPion XL (Wellbutrin XL) 300 mg 24 hr tablet Take 1 tablet (300 mg) by mouth once daily. Do not crush, chew, or split.    Historical Provider, MD    busPIRone (Buspar) 5 mg tablet Take by mouth 3 times a day as needed.    Historical Provider, MD   cyanocobalamin (Vitamin B-12) 250 mcg tablet Take by mouth once daily.    Historical Provider, MD   docosahexaenoic acid/epa (FISH OIL ORAL) Take by mouth.    Historical Provider, MD   flaxseed oiL oil     Historical Provider, MD   levothyroxine (Synthroid, Levoxyl) 200 mcg tablet Take 1 tablet (200 mcg) by mouth once daily in the morning. Take before meals.    Historical Provider, MD   lisinopril 10 mg tablet Take 1 tablet (10 mg) by mouth once daily.    Historical Provider, MD   metFORMIN (Glucophage) 500 mg tablet Take 1 tablet (500 mg) by mouth. Take 1 or 2 tablets daily at bedtime. Do not crush, chew, or split.    Historical Provider, MD   oxyCODONE (Roxicodone) 5 mg immediate release tablet Take 1 tablet (5 mg) by mouth every 6 hours if needed for moderate pain (4 - 6). 1/31/24   Ama Dodson PA-C   pimecrolimus (Elidel) 1 % cream Apply 1 Application topically once daily. To face 1/2/24   Historical Provider, MD   tirzepatide (Mounjaro) 10 mg/0.5 mL pen injector Inject 10 mg under the skin. Takes one time weekly    Historical Provider, MD     ALLERGIES:   Allergies   Allergen Reactions    Ciprofloxacin Hives and Other     Rapid heart rate, fainted    Nsaids (Non-Steroidal Anti-Inflammatory Drug) Hives       REVIEW OF SYSTEMS:  Review of Systems    See above    PHYSICAL EXAM:  Physical Exam    Physical Exam     Constitutional- moderate discomfort  Cards- regular rate   Resp- nonlabored breathing on room air   Abdomen- soft, not distended; RUQ tenderness, positive Mcelroy's sign  Extremities- ROSE   Skin- diaphoretic  Neuro- alert and oriented x3   Psych- appropriate mood   Tubes/lines- PIV   IMAGING SUMMARY:  (summary of findings, not a copy of dictation)  See above    LABS:  Results from last 7 days   Lab Units 02/19/24  0752   WBC AUTO x10*3/uL 15.6*   HEMOGLOBIN g/dL 13.3*   HEMATOCRIT % 40.0*   PLATELETS  AUTO x10*3/uL 402   NEUTROS PCT AUTO % 81.3   LYMPHS PCT AUTO % 8.0   MONOS PCT AUTO % 9.8   EOS PCT AUTO % 0.2     Results from last 7 days   Lab Units 02/19/24  0752   APTT seconds 32   INR  1.2*     Results from last 7 days   Lab Units 02/19/24  0752   SODIUM mmol/L 132*   POTASSIUM mmol/L 4.7   CHLORIDE mmol/L 97*   CO2 mmol/L 25   BUN mg/dL 11   CREATININE mg/dL 0.93   CALCIUM mg/dL 9.8   PROTEIN TOTAL g/dL 7.3   BILIRUBIN TOTAL mg/dL 2.7*   ALK PHOS U/L 112   ALT U/L 18   AST U/L 14   GLUCOSE mg/dL 186*     Results from last 7 days   Lab Units 02/19/24  0752   BILIRUBIN TOTAL mg/dL 2.7*             I have reviewed all laboratory and imaging results ordered/pertinent for this encounter.

## 2024-02-19 NOTE — CONSULTS
[facility]   [facility]  INITIAL CONSULT NOTE       Reason For Consult  evaluation of possible CBD stricture due to patient's history    SUBJECTIVE     History Of Present Illness  Dk Alas is a 43 y.o. male with a past medical history of posterior mediastinal mass s/p EUS+ERCP+biopsy done n the 11/30/23, HTN, DLD, T2DM, depression, anxiety, and ADHD, admitted on 2/19/2024 with abdominal pain.     GI is consulted for evaluation of possible CBD stricture due to patient's history.    Of note, the patient presented with substernal chest pain and N/V in November 2023. Labs showed elevated LFTs which led to RUQ US and CT scan that showed gallbladder stones and unremarkable bile duct. He was transferred to [facility] for ERCP. Overnight, he ended up getting a chest CT for the substernal chest pain and elevated D dimer, with findings of a mediastinal mass. He had EUS and ERCP 11/28/23 with findings of a mediastinal mass, multiple enlarged abdominal lymph nodes and localized biliary stricture. Biopsies of the mediastinal mass and LN were unrevealing, including repeat EUS with FNB 12/11. Plan for surgical resection at end of month. He is here for ERCP for biliary stricture evaluation and stent removal/exchange.     The patient states that today he started having epigastric and RUQ pain. Denied fever, chills, nausea, vomiting, diarrhea, constipation, melena, and hematochezia.     Review of Systems  12-point ROS has been reviewed and is negative, except if mentioned otherwise above.    Past Medical History:    Past Medical History:   Diagnosis Date    ADHD (attention deficit hyperactivity disorder)     Anxiety     Cholelithiasis     Contusion of right shoulder     Follows with PT Jonnie Rader    Depression     Diabetes mellitus (CMS/HCC)     Manged by PCP    H/O electrocardiogram     NORMAL ECG in 2021    History of ERCP 01/09/2024    Hyperlipidemia     Hypertension      Hypothyroidism     Mediastinal mass 2024    Gen: Tyrone Emmanuel    Motion sickness     Obstructive jaundice     Per ERCP on 24    Pulmonary nodule     solid non-calcified pulmonary nodule measuring greater than 8 mm.       Home Medications  (Not in a hospital admission)      Surgical History:    Past Surgical History:   Procedure Laterality Date    BILE DUCT STENT PLACEMENT      Stent placed 2023 for narrowing of CBD    CT ANGIO CHEST FOR PULMONARY EMBOLISM      CT scan on 23    CT ANGIO CORONARY ART WITH HEARTFLOW IF SCORE >30%  10/26/2021    CT ANGIO CORONARY ART WITH HEARTFLOW IF SCORE >30% 10/26/2021       Allergies:    Allergies   Allergen Reactions    Ciprofloxacin Hives and Other     Rapid heart rate, fainted    Nsaids (Non-Steroidal Anti-Inflammatory Drug) Hives       Social History:    Social History     Socioeconomic History    Marital status: Significant Other     Spouse name: Not on file    Number of children: Not on file    Years of education: Not on file    Highest education level: Not on file   Occupational History    Not on file   Tobacco Use    Smoking status: Former     Types: Cigarettes     Quit date:      Years since quittin.1    Smokeless tobacco: Never   Vaping Use    Vaping Use: Never used   Substance and Sexual Activity    Alcohol use: Never    Drug use: Never    Sexual activity: Defer   Other Topics Concern    Not on file   Social History Narrative    Not on file     Social Determinants of Health     Financial Resource Strain: Low Risk  (2023)    Overall Financial Resource Strain (CARDIA)     Difficulty of Paying Living Expenses: Not hard at all   Food Insecurity: Not on file   Transportation Needs: No Transportation Needs (2023)    PRAPARE - Transportation     Lack of Transportation (Medical): No     Lack of Transportation (Non-Medical): No   Physical Activity: Not on file   Stress: Not on file   Social Connections: Not on file   Intimate Partner  Violence: Not on file   Housing Stability: Low Risk  (11/29/2023)    Housing Stability Vital Sign     Unable to Pay for Housing in the Last Year: No     Number of Places Lived in the Last Year: 1     Unstable Housing in the Last Year: No       Family History:    Family History   Problem Relation Name Age of Onset    Diabetes Mother      Hypertension Mother      Heart attack Mother      Heart attack Maternal Grandmother         EXAM     Vitals:    Vitals:    02/19/24 0640 02/19/24 0833 02/19/24 1203   BP: 146/90 149/85 146/84   Pulse: 89 86 91   Resp: 18 19 20   Temp: 36.7 °C (98.1 °F)     SpO2: 98% 96% 97%   Weight: 102 kg (225 lb)       Failed to redirect to the Timeline version of the [employer] SmartLink.  No intake or output data in the 24 hours ending 02/19/24 1336    Physical Exam   GENERAL: In no acute distress.  NEUROLOGIC: A and O x 3. Cranial nerves 2 through 12 grossly intact. Intact motor and sensory systems, with normal reflexes and coordination.    HEENT: Pupils are equal, round, and reactive to light and accommodation. Extraocular motion intact. Scleral icterus.  CARDIAC: S1 + S2, RRR, no M/R/G.    LUNGS: CTA BL. No wheezes or coarse breath sounds. Symmetric respirations. No increased work of breathing.   ABDOMEN: Tender to palpation in epigastric area and RUQ. No rebound or guarding.  SKIN: Clean, warm, dry, and intact with normal skin turgor.  PSYCH: Appropriate mood and behavior.      OBJECTIVE                                                                              Medications       Current Facility-Administered Medications:     acetaminophen (Tylenol) tablet 650 mg, 650 mg, oral, q6h, Rocky Mae MD    enoxaparin (Lovenox) syringe 40 mg, 40 mg, subcutaneous, q24h, Rocky Mae MD    HYDROmorphone (Dilaudid) injection 0.2 mg, 0.2 mg, intravenous, q4h PRN, Rocky Mae MD    lactated Ringer's infusion, 100 mL/hr, intravenous, Continuous, Rocky Mae MD    ondansetron (Zofran) injection 4 mg, 4 mg,  intravenous, q6h PRN, Rocky Mae MD    oxyCODONE (Roxicodone) immediate release tablet 10 mg, 10 mg, oral, q4h PRN, Rocky Mae MD    oxyCODONE (Roxicodone) immediate release tablet 5 mg, 5 mg, oral, q4h PRN, Rocky Mae MD    piperacillin-tazobactam-dextrose (Zosyn) IV 3.375 g, 3.375 g, intravenous, q6h, Los Trujillo DO    polyethylene glycol (Glycolax, Miralax) packet 17 g, 17 g, oral, Daily, Rocky Mae MD    sodium chloride 0.9% infusion, 125 mL/hr, intravenous, Continuous, Los Trujillo DO    Current Outpatient Medications:     acetaminophen (Tylenol) 325 mg tablet, Take 2 tablets (650 mg) by mouth every 4 hours if needed for mild pain (1 - 3) or fever (temp greater than 38.0 C)., Disp: 30 tablet, Rfl: 0    amphetamine-dextroamphetamine (Adderall) 15 mg tablet, Take 1 tablet (15 mg) by mouth once daily in the morning. Do not crush or chew., Disp: , Rfl:     atorvastatin (Lipitor) 20 mg tablet, Take 1 tablet (20 mg) by mouth once daily., Disp: , Rfl:     buPROPion XL (Wellbutrin XL) 300 mg 24 hr tablet, Take 1 tablet (300 mg) by mouth once daily. Do not crush, chew, or split., Disp: , Rfl:     busPIRone (Buspar) 5 mg tablet, Take by mouth 3 times a day as needed., Disp: , Rfl:     cyanocobalamin (Vitamin B-12) 250 mcg tablet, Take by mouth once daily., Disp: , Rfl:     docosahexaenoic acid/epa (FISH OIL ORAL), Take by mouth., Disp: , Rfl:     flaxseed oiL oil, , Disp: , Rfl:     levothyroxine (Synthroid, Levoxyl) 200 mcg tablet, Take 1 tablet (200 mcg) by mouth once daily in the morning. Take before meals., Disp: , Rfl:     lisinopril 10 mg tablet, Take 1 tablet (10 mg) by mouth once daily., Disp: , Rfl:     metFORMIN (Glucophage) 500 mg tablet, Take 1 tablet (500 mg) by mouth. Take 1 or 2 tablets daily at bedtime. Do not crush, chew, or split., Disp: , Rfl:     oxyCODONE (Roxicodone) 5 mg immediate release tablet, Take 1 tablet (5 mg) by mouth every 6 hours if needed for moderate pain (4 - 6)., Disp: 15  tablet, Rfl: 0    pimecrolimus (Elidel) 1 % cream, Apply 1 Application topically once daily. To face, Disp: , Rfl:     tirzepatide (Mounjaro) 10 mg/0.5 mL pen injector, Inject 10 mg under the skin. Takes one time weekly, Disp: , Rfl:                                                                             Labs     Results for orders placed or performed during the hospital encounter of 02/19/24 (from the past 24 hour(s))   Blood Gas Venous Full Panel   Result Value Ref Range    POCT pH, Venous 7.42 7.33 - 7.43 pH    POCT pCO2, Venous 44 41 - 51 mm Hg    POCT pO2, Venous 46 (H) 35 - 45 mm Hg    POCT SO2, Venous 73 45 - 75 %    POCT Oxy Hemoglobin, Venous 71.5 45.0 - 75.0 %    POCT Hematocrit Calculated, Venous 41.0 41.0 - 52.0 %    POCT Sodium, Venous 129 (L) 136 - 145 mmol/L    POCT Potassium, Venous 4.9 3.5 - 5.3 mmol/L    POCT Chloride, Venous 96 (L) 98 - 107 mmol/L    POCT Ionized Calicum, Venous 1.17 1.10 - 1.33 mmol/L    POCT Glucose, Venous 207 (H) 74 - 99 mg/dL    POCT Lactate, Venous 1.1 0.4 - 2.0 mmol/L    POCT Base Excess, Venous 3.4 (H) -2.0 - 3.0 mmol/L    POCT HCO3 Calculated, Venous 28.5 (H) 22.0 - 26.0 mmol/L    POCT Hemoglobin, Venous 13.7 13.5 - 17.5 g/dL    POCT Anion Gap, Venous 9.0 (L) 10.0 - 25.0 mmol/L    Patient Temperature 37.0 degrees Celsius    FiO2 21 %   CBC and Auto Differential   Result Value Ref Range    WBC 15.6 (H) 4.4 - 11.3 x10*3/uL    nRBC 0.0 0.0 - 0.0 /100 WBCs    RBC 4.98 4.50 - 5.90 x10*6/uL    Hemoglobin 13.3 (L) 13.5 - 17.5 g/dL    Hematocrit 40.0 (L) 41.0 - 52.0 %    MCV 80 80 - 100 fL    MCH 26.7 26.0 - 34.0 pg    MCHC 33.3 32.0 - 36.0 g/dL    RDW 12.9 11.5 - 14.5 %    Platelets 402 150 - 450 x10*3/uL    Neutrophils % 81.3 40.0 - 80.0 %    Immature Granulocytes %, Automated 0.4 0.0 - 0.9 %    Lymphocytes % 8.0 13.0 - 44.0 %    Monocytes % 9.8 2.0 - 10.0 %    Eosinophils % 0.2 0.0 - 6.0 %    Basophils % 0.3 0.0 - 2.0 %    Neutrophils Absolute 12.64 (H) 1.20 - 7.70  x10*3/uL    Immature Granulocytes Absolute, Automated 0.06 0.00 - 0.70 x10*3/uL    Lymphocytes Absolute 1.25 1.20 - 4.80 x10*3/uL    Monocytes Absolute 1.53 (H) 0.10 - 1.00 x10*3/uL    Eosinophils Absolute 0.03 0.00 - 0.70 x10*3/uL    Basophils Absolute 0.04 0.00 - 0.10 x10*3/uL   Comprehensive metabolic panel   Result Value Ref Range    Glucose 186 (H) 74 - 99 mg/dL    Sodium 132 (L) 136 - 145 mmol/L    Potassium 4.7 3.5 - 5.3 mmol/L    Chloride 97 (L) 98 - 107 mmol/L    Bicarbonate 25 21 - 32 mmol/L    Anion Gap 15 10 - 20 mmol/L    Urea Nitrogen 11 6 - 23 mg/dL    Creatinine 0.93 0.50 - 1.30 mg/dL    eGFR >90 >60 mL/min/1.73m*2    Calcium 9.8 8.6 - 10.6 mg/dL    Albumin 3.9 3.4 - 5.0 g/dL    Alkaline Phosphatase 112 33 - 120 U/L    Total Protein 7.3 6.4 - 8.2 g/dL    AST 14 9 - 39 U/L    Bilirubin, Total 2.7 (H) 0.0 - 1.2 mg/dL    ALT 18 10 - 52 U/L   Lipase   Result Value Ref Range    Lipase 10 9 - 82 U/L   Coagulation Screen   Result Value Ref Range    Protime 13.6 (H) 9.8 - 12.8 seconds    INR 1.2 (H) 0.9 - 1.1    aPTT 32 27 - 38 seconds   Type and Screen   Result Value Ref Range    ABO TYPE A     Rh TYPE POS     ANTIBODY SCREEN NEG                                                                               Imaging           02/19/24 POCUS RUQ:  Findings:   Gallstones: The gallbladder was visualized and was NEGATIVE for stones.   Gallbladder Wall: abnormal   Sonographic Mcelroy's: A Sonographic Mcelroy's Sign was PRESENT.                                                                          GI Procedures     EUS and ERCP 11/28/23 with findings of a mediastinal mass, multiple enlarged abdominal lymph nodes and localized biliary stricture. Biopsies of the mediastinal mass and LN were unrevealing, including repeat EUS with FNB 12/11.     A. MEDIASTINUM, FINE NEEDLE ASPIRATION POSTERIOR, CYTOLOGY AND CELL BLOCK:    --  No carcinoma cells identified.  -- Limited lymphoid sample with predominantly  necrotic/degenerated material and amorphous debris with rare giant cells and histiocytic aggregates,  see note.  -- GMS and AFB stains are negative for micro organisms.  -- Please correlate with concurrent surgical specimen, [id number].     Note: The specimen consists predominantly of necrotic and amorphous debris with rare giant cells and histiocytic aggregates suggestive of possible granulomas. Rare fragments of benign squamous epithelium present, possibility of contamination due to procedure cannot be excluded.  Please correlate with microbial cultures. Clinical and radiologic correlation is recommended.      B. LYMPH NODE, PERIPANCREATIC FINE NEEDLE ASPIRATION RIGHT - ELIAS PORTAL LYMPH NODE, CYTOLOGY AND CELL BLOCK:   -- No malignant cells identified.  -- Lymphoid sample.    A.  Mediastinal mass, biopsy:  -Detached fragments of necrotic and hyalinized debris, soft tissue and lymphoid tissue.  See note     Note:  Multiple fragments of soft tissue, lymphoid tissue (immunoreactive with CD3 and CD20), necrotic debris and hyalinized fragments are seen.  CD68 highlights macrophages surrounding small fragments of hyalinized tissue.  These findings are nonspecific but are suspicious for poorly formed granulomas involving soft tissue/lymphoid tissue.  GMS and AFB histochemical stains are negative.  Findings not might not be representative of the clinically identified lesion.  Clinical correlation and correlation with cultures, if available, are strongly recommended.  Correlate with concomitant cytology results.    12/20/2023 ERCP:  Impression  The major papilla had a bulging appearance.  Complete major papilla sphincterotomy was performed.  Localized, intrinsic and severe stricture was visualized in the mid common bile duct. The main bile duct was relatively small in caliber. There were no intraductal stones/sludge.  Multiple small gallstones were noted in the gallbladder.   Performed brushings with cytology brush in the  common bile duct  One 10 Fr x 9 cm double flanged stent was placed in the proximal common bile duct     Findings  A  film of the abdomen was obtained and was normal.   The esophagus was successfully intubated under direct vision without detailed examination of the pharynx, larynx, and associated structures, and upper GI tract.  The upper GI tract was grossly normal.    The duodenoscope was advanced to the descending duodenum. Inspection of the major papilla was performed. The major papilla was native and had a slightly bulging appearance.   A 0.025 inch x 270 cm straight Visiglide wire was passed into the biliary tree.  The Fusion OMNI sphincterotome was passed over the guidewire and the bile duct was then deeply cannulated.  Contrast was injected.  I personally interpreted the bile duct images.  There was brisk flow of contrast through the ducts.  Image quality was excellent.  Contrast extended to the hepatic ducts.  Localized, intrinsic and severe stricture was visualized in the mid common bile duct. The stricture was traversable. But the entire main bile duct was relatively small in caliber with no filling defects. The cystic duct had a low insertion and was long. The gallbladder contained what appeared to be numerous small stones.   Am 8 mm biliary sphincterotomy was made with a monofilament traction (standard) sphincterotome using ERBE electrocautery.  There was no post-sphincterotomy bleeding.    The biliary tree was swept with a 12 mm balloon starting at the bifurcation.  Nothing was found.   Brushings for cytology were performed in the mid-bile duct stricture.   One 10 Fr by 9 cm temporary plastic stent with a single external flap and a single internal flap was placed 8 cm into the common bile duct.  Bile flowed through the stent.  The stent was in good position.    There was prompt drainage of bile and contrast at the end of the procedure.   The pancreatic duct was neither cannulated nor injected  during the procedure.     01/09/2024 ERCP  Impression  One patent 10 Fr double flanged stent was visualized in the common bile duct. The stent was removed  Previous biliary sphincterotomy was wide open.  Cholangiogram showed a completely normal bile duct, with resolved prior biliary stenosis.   Multiple sweeps were performed confirming no biliary sludge/stones and no stenosis.      Findings  A  film of the abdomen was obtained. One biliary stent was seen in the right upper quadrant.   The esophagus was successfully intubated under direct vision without detailed examination of the pharynx, larynx, and associated structures, and upper GI tract.  The upper GI tract was grossly normal.    The duodenoscope was advanced to the descending duodenum. Inspection of the major papilla was performed. One temporary plastic stent originating in the biliary tree was seen emerging from the major papilla.  The stent was patent and was removed from the biliary tree using a snare.   A biliary sphincterotomy had been performed.  The sphincterotomy appeared open.    A 0.025 inch x 270 cm straight Visiglide wire was passed into the biliary tree.  The extraction balloon was passed over the guidewire and the bile duct was then deeply cannulated.  Contrast was injected.  I personally interpreted the bile duct images.  There was brisk flow of contrast through the ducts.  Image quality was excellent.  Contrast extended to the hepatic ducts.  The bile duct appeared completely normal. Previously seen stricture was resolved. There was no ductal dilation or filling defects. The cystic duct insertion site was seen and was patent.   The biliary tree was swept with a 12 mm balloon starting at the bifurcation.  Nothing was found.   Final occlusion balloon cholangiogram confirmed a completely normal appearing bile duct.   There was prompt drainage of bile and contrast at the end of the procedure.   The pancreatic duct was neither cannulated nor  injected during the procedure.    ASSESSMENT / PLAN                  ASSESSMENT/PLAN:    Dk Alas is a 43 y.o. male with a past medical history of posterior mediastinal mass s/p EUS+ERCP+biopsy done n the 11/30/23, HTN, DLD, T2DM, depression, anxiety, and ADHD, admitted on 2/19/2024 with abdominal pain.     GI is consulted for evaluation of possible CBD stricture due to patient's history.    It is unlikely for the patient to have a biliary stricture, as his cholangiogram on the 01/09/2024 showed a completely normal bile duct, with resolved prior biliary stenosis.    Recommendations:  - Please acquire MRCP to better evaluate the biliary tree for possible stricture.    The above recommendations were communicated to the ACS team.    Patient was seen and discussed with Dr. Daniels.    Gastroenterology will continue to follow.  During weekday hours of 7am-5pm please do not hesitate to contact me on [employer] Chat or page [phone], if there are any further questions between the weekday hours of 7am-5pm.   After hours, on weekends, and on holidays, please page [place] [phone]. Thank you.    Ruth Ayala MD  PGY-4 Gastroenterology and Hepatology Fellow  [facility]

## 2024-02-19 NOTE — ED PROCEDURE NOTE
Procedure    Performed by: Syed Crandall MD  Authorized by: Los Trujillo DO      Gastrointestinal Indications: abdominal pain      Musculoskeletal Indications: flank pain      Procedure: Biliary Ultrasound    Findings:  Gallstones: The gallbladder was visualized and was NEGATIVE for stones.  Gallbladder Wall: abnormal  Sonographic Mcelroy's: A Sonographic Mcelroy's Sign was PRESENT.        Impression:  Biliary: The focused biliary ultrasound exam had ABNORMAL findings as specified.                   Syed Crandall MD  Resident  02/19/24 3246

## 2024-02-19 NOTE — PROGRESS NOTES
Handoff Note    I received Dk Alas in signout from Dr. Evans.  Please see the previous note for all HPI, PE and MDM up to the time of signout at 0700.    In brief Dk Alas is an 43 y.o. male presenting for   Chief Complaint   Patient presents with    Abdominal Pain     [place]   .      At the time of signout, the patient's disposition is pending recommendations from acute care surgery.  Patient's analgesia was redosed multiple times.  Acute care surgery recommending continuing Zosyn every 6 hours as well as maintenance fluids.  These are ordered.  Acute care surgery will admit the patient to their service for further management.  Patient remained hemodynamically stable and awaits transfer to regular nursing floor.      Throughout the ED stay, the patient was monitored and re-examined for any changes in stability or symptomatology.     ED Course:   ED Course as of 02/19/24 1634   Mon Feb 19, 2024   0918 ED Senior resident attestation: 43-year-old male with history of obstructive jaundice s/p ERCP with stent placement 11/28/2023, posterior mediastinal mass resection and RLL wedge resection 1/30/2024 who presents as a transfer from outside hospital for concern for acute cholecystitis.  Patient reports recurrent right upper quadrant and epigastric pain similar to the pain that initially led to the ERCP 2 months prior.  He reports chills at home.  CT imaging at outside hospital revealed a dilated gallbladder with wall thickening and pericholecystic fluid concerning for acute cholecystitis for which patient was transferred.  On exam, patient continues to endorse significant right upper quadrant pain with positive Mcelroy sign, hemodynamically stable and otherwise nontoxic-appearing.  Zosyn was re-dosed and patient's pain was controlled.  Acute care surgery was immediately consulted. [MARINO]      ED Course User Index  [MARINO] Nura Mchugh, DO         Diagnoses as of 02/19/24 1634   Acute cholecystitis         Patient  seen by and discussed with Dr. Lewis    Pt Disposition: Admit-ACS    Procedures      Los Trujillo DO  Emergency Medicine PGY-2  [facility]

## 2024-02-20 ENCOUNTER — ANESTHESIA EVENT (OUTPATIENT)
Dept: OPERATING ROOM | Facility: HOSPITAL | Age: 44
End: 2024-02-20
Payer: COMMERCIAL

## 2024-02-20 ENCOUNTER — ANESTHESIA (OUTPATIENT)
Dept: OPERATING ROOM | Facility: HOSPITAL | Age: 44
End: 2024-02-20
Payer: COMMERCIAL

## 2024-02-20 PROBLEM — K85.90 PANCREATITIS (HHS-HCC): Status: ACTIVE | Noted: 2024-02-20

## 2024-02-20 LAB
ALBUMIN SERPL BCP-MCNC: 3.5 G/DL (ref 3.4–5)
ALP SERPL-CCNC: 200 U/L (ref 33–120)
ALT SERPL W P-5'-P-CCNC: 50 U/L (ref 10–52)
ANION GAP SERPL CALC-SCNC: 13 MMOL/L (ref 10–20)
AST SERPL W P-5'-P-CCNC: 55 U/L (ref 9–39)
ATRIAL RATE: 89 BPM
BILIRUB SERPL-MCNC: 3.7 MG/DL (ref 0–1.2)
BUN SERPL-MCNC: 12 MG/DL (ref 6–23)
CALCIUM SERPL-MCNC: 8.9 MG/DL (ref 8.6–10.6)
CHLORIDE SERPL-SCNC: 98 MMOL/L (ref 98–107)
CO2 SERPL-SCNC: 27 MMOL/L (ref 21–32)
CREAT SERPL-MCNC: 0.88 MG/DL (ref 0.5–1.3)
EGFRCR SERPLBLD CKD-EPI 2021: >90 ML/MIN/1.73M*2
ERYTHROCYTE [DISTWIDTH] IN BLOOD BY AUTOMATED COUNT: 12.9 % (ref 11.5–14.5)
GLUCOSE BLD MANUAL STRIP-MCNC: 118 MG/DL (ref 74–99)
GLUCOSE BLD MANUAL STRIP-MCNC: 172 MG/DL (ref 74–99)
GLUCOSE BLD MANUAL STRIP-MCNC: 175 MG/DL (ref 74–99)
GLUCOSE SERPL-MCNC: 160 MG/DL (ref 74–99)
HCT VFR BLD AUTO: 35.2 % (ref 41–52)
HGB BLD-MCNC: 11.2 G/DL (ref 13.5–17.5)
MAGNESIUM SERPL-MCNC: 1.82 MG/DL (ref 1.6–2.4)
MCH RBC QN AUTO: 27 PG (ref 26–34)
MCHC RBC AUTO-ENTMCNC: 31.8 G/DL (ref 32–36)
MCV RBC AUTO: 85 FL (ref 80–100)
NRBC BLD-RTO: 0 /100 WBCS (ref 0–0)
P AXIS: 34 DEGREES
P OFFSET: 203 MS
P ONSET: 147 MS
PLATELET # BLD AUTO: 309 X10*3/UL (ref 150–450)
POTASSIUM SERPL-SCNC: 4.3 MMOL/L (ref 3.5–5.3)
PR INTERVAL: 152 MS
PROT SERPL-MCNC: 6.3 G/DL (ref 6.4–8.2)
Q ONSET: 223 MS
QRS COUNT: 14 BEATS
QRS DURATION: 82 MS
QT INTERVAL: 360 MS
QTC CALCULATION(BAZETT): 438 MS
QTC FREDERICIA: 410 MS
R AXIS: 46 DEGREES
RBC # BLD AUTO: 4.15 X10*6/UL (ref 4.5–5.9)
SODIUM SERPL-SCNC: 134 MMOL/L (ref 136–145)
T AXIS: 46 DEGREES
T OFFSET: 403 MS
VENTRICULAR RATE: 89 BPM
WBC # BLD AUTO: 10 X10*3/UL (ref 4.4–11.3)

## 2024-02-20 PROCEDURE — 2500000005 HC RX 250 GENERAL PHARMACY W/O HCPCS

## 2024-02-20 PROCEDURE — 2500000004 HC RX 250 GENERAL PHARMACY W/ HCPCS (ALT 636 FOR OP/ED)

## 2024-02-20 PROCEDURE — 36415 COLL VENOUS BLD VENIPUNCTURE: CPT

## 2024-02-20 PROCEDURE — 85027 COMPLETE CBC AUTOMATED: CPT

## 2024-02-20 PROCEDURE — 84075 ASSAY ALKALINE PHOSPHATASE: CPT

## 2024-02-20 PROCEDURE — 96366 THER/PROPH/DIAG IV INF ADDON: CPT | Mod: 59

## 2024-02-20 PROCEDURE — 87184 SC STD DISK METHOD PER PLATE: CPT | Performed by: SURGERY

## 2024-02-20 PROCEDURE — 3600000003 HC OR TIME - INITIAL BASE CHARGE - PROCEDURE LEVEL THREE: Performed by: SURGERY

## 2024-02-20 PROCEDURE — 2500000001 HC RX 250 WO HCPCS SELF ADMINISTERED DRUGS (ALT 637 FOR MEDICARE OP)

## 2024-02-20 PROCEDURE — 2720000007 HC OR 272 NO HCPCS: Performed by: SURGERY

## 2024-02-20 PROCEDURE — 83735 ASSAY OF MAGNESIUM: CPT

## 2024-02-20 PROCEDURE — 82947 ASSAY GLUCOSE BLOOD QUANT: CPT

## 2024-02-20 PROCEDURE — 0FJ44ZZ INSPECTION OF GALLBLADDER, PERCUTANEOUS ENDOSCOPIC APPROACH: ICD-10-PCS | Performed by: SURGERY

## 2024-02-20 PROCEDURE — 3600000008 HC OR TIME - EACH INCREMENTAL 1 MINUTE - PROCEDURE LEVEL THREE: Performed by: SURGERY

## 2024-02-20 PROCEDURE — 1100000001 HC PRIVATE ROOM DAILY

## 2024-02-20 PROCEDURE — 88304 TISSUE EXAM BY PATHOLOGIST: CPT | Mod: TC,SUR | Performed by: SURGERY

## 2024-02-20 PROCEDURE — 93010 ELECTROCARDIOGRAM REPORT: CPT

## 2024-02-20 PROCEDURE — 88304 TISSUE EXAM BY PATHOLOGIST: CPT | Performed by: PATHOLOGY

## 2024-02-20 PROCEDURE — 7100000002 HC RECOVERY ROOM TIME - EACH INCREMENTAL 1 MINUTE: Performed by: SURGERY

## 2024-02-20 PROCEDURE — 3700000002 HC GENERAL ANESTHESIA TIME - EACH INCREMENTAL 1 MINUTE: Performed by: SURGERY

## 2024-02-20 PROCEDURE — 7100000001 HC RECOVERY ROOM TIME - INITIAL BASE CHARGE: Performed by: SURGERY

## 2024-02-20 PROCEDURE — 2500000004 HC RX 250 GENERAL PHARMACY W/ HCPCS (ALT 636 FOR OP/ED): Performed by: SURGERY

## 2024-02-20 PROCEDURE — 2500000004 HC RX 250 GENERAL PHARMACY W/ HCPCS (ALT 636 FOR OP/ED): Performed by: STUDENT IN AN ORGANIZED HEALTH CARE EDUCATION/TRAINING PROGRAM

## 2024-02-20 PROCEDURE — 2500000004 HC RX 250 GENERAL PHARMACY W/ HCPCS (ALT 636 FOR OP/ED): Performed by: ANESTHESIOLOGY

## 2024-02-20 PROCEDURE — 96376 TX/PRO/DX INJ SAME DRUG ADON: CPT | Mod: 59

## 2024-02-20 PROCEDURE — A47600 PR REMOVAL GALLBLADDER: Performed by: ANESTHESIOLOGY

## 2024-02-20 PROCEDURE — 3700000001 HC GENERAL ANESTHESIA TIME - INITIAL BASE CHARGE: Performed by: SURGERY

## 2024-02-20 PROCEDURE — 2500000005 HC RX 250 GENERAL PHARMACY W/O HCPCS: Performed by: STUDENT IN AN ORGANIZED HEALTH CARE EDUCATION/TRAINING PROGRAM

## 2024-02-20 PROCEDURE — 0FT40ZZ RESECTION OF GALLBLADDER, OPEN APPROACH: ICD-10-PCS | Performed by: SURGERY

## 2024-02-20 PROCEDURE — 87070 CULTURE OTHR SPECIMN AEROBIC: CPT | Performed by: SURGERY

## 2024-02-20 PROCEDURE — 99233 SBSQ HOSP IP/OBS HIGH 50: CPT | Performed by: STUDENT IN AN ORGANIZED HEALTH CARE EDUCATION/TRAINING PROGRAM

## 2024-02-20 RX ORDER — DROPERIDOL 2.5 MG/ML
0.62 INJECTION, SOLUTION INTRAMUSCULAR; INTRAVENOUS ONCE AS NEEDED
Status: COMPLETED | OUTPATIENT
Start: 2024-02-20 | End: 2024-02-20

## 2024-02-20 RX ORDER — MIDAZOLAM HYDROCHLORIDE 1 MG/ML
INJECTION INTRAMUSCULAR; INTRAVENOUS AS NEEDED
Status: DISCONTINUED | OUTPATIENT
Start: 2024-02-20 | End: 2024-02-20

## 2024-02-20 RX ORDER — SODIUM CHLORIDE, SODIUM LACTATE, POTASSIUM CHLORIDE, CALCIUM CHLORIDE 600; 310; 30; 20 MG/100ML; MG/100ML; MG/100ML; MG/100ML
75 INJECTION, SOLUTION INTRAVENOUS CONTINUOUS
Status: DISCONTINUED | OUTPATIENT
Start: 2024-02-20 | End: 2024-02-22

## 2024-02-20 RX ORDER — BUPIVACAINE HYDROCHLORIDE 2.5 MG/ML
INJECTION, SOLUTION EPIDURAL; INFILTRATION; INTRACAUDAL AS NEEDED
Status: DISCONTINUED | OUTPATIENT
Start: 2024-02-20 | End: 2024-02-20 | Stop reason: HOSPADM

## 2024-02-20 RX ORDER — PROPOFOL 10 MG/ML
INJECTION, EMULSION INTRAVENOUS AS NEEDED
Status: DISCONTINUED | OUTPATIENT
Start: 2024-02-20 | End: 2024-02-20

## 2024-02-20 RX ORDER — MIDAZOLAM HYDROCHLORIDE 1 MG/ML
INJECTION, SOLUTION INTRAMUSCULAR; INTRAVENOUS AS NEEDED
Status: DISCONTINUED | OUTPATIENT
Start: 2024-02-20 | End: 2024-02-20

## 2024-02-20 RX ORDER — LIDOCAINE HYDROCHLORIDE 20 MG/ML
INJECTION, SOLUTION INFILTRATION; PERINEURAL AS NEEDED
Status: DISCONTINUED | OUTPATIENT
Start: 2024-02-20 | End: 2024-02-20

## 2024-02-20 RX ORDER — INSULIN LISPRO 100 [IU]/ML
0-5 INJECTION, SOLUTION INTRAVENOUS; SUBCUTANEOUS EVERY 4 HOURS
Status: DISCONTINUED | OUTPATIENT
Start: 2024-02-21 | End: 2024-02-23 | Stop reason: HOSPADM

## 2024-02-20 RX ORDER — HYDROMORPHONE HYDROCHLORIDE 1 MG/ML
INJECTION, SOLUTION INTRAMUSCULAR; INTRAVENOUS; SUBCUTANEOUS AS NEEDED
Status: DISCONTINUED | OUTPATIENT
Start: 2024-02-20 | End: 2024-02-20

## 2024-02-20 RX ORDER — ACETAMINOPHEN 325 MG/1
650 TABLET ORAL EVERY 4 HOURS PRN
Status: DISCONTINUED | OUTPATIENT
Start: 2024-02-20 | End: 2024-02-20 | Stop reason: HOSPADM

## 2024-02-20 RX ORDER — ONDANSETRON HYDROCHLORIDE 2 MG/ML
4 INJECTION, SOLUTION INTRAVENOUS ONCE AS NEEDED
Status: DISCONTINUED | OUTPATIENT
Start: 2024-02-20 | End: 2024-02-20 | Stop reason: HOSPADM

## 2024-02-20 RX ORDER — LABETALOL HYDROCHLORIDE 5 MG/ML
5 INJECTION, SOLUTION INTRAVENOUS ONCE AS NEEDED
Status: COMPLETED | OUTPATIENT
Start: 2024-02-20 | End: 2024-02-20

## 2024-02-20 RX ORDER — SUCCINYLCHOLINE CHLORIDE 100 MG/5ML
SYRINGE (ML) INTRAVENOUS AS NEEDED
Status: DISCONTINUED | OUTPATIENT
Start: 2024-02-20 | End: 2024-02-20

## 2024-02-20 RX ORDER — DEXAMETHASONE SODIUM PHOSPHATE 4 MG/ML
INJECTION, SOLUTION INTRA-ARTICULAR; INTRALESIONAL; INTRAMUSCULAR; INTRAVENOUS; SOFT TISSUE AS NEEDED
Status: DISCONTINUED | OUTPATIENT
Start: 2024-02-20 | End: 2024-02-20

## 2024-02-20 RX ORDER — SODIUM CHLORIDE, SODIUM LACTATE, POTASSIUM CHLORIDE, CALCIUM CHLORIDE 600; 310; 30; 20 MG/100ML; MG/100ML; MG/100ML; MG/100ML
100 INJECTION, SOLUTION INTRAVENOUS CONTINUOUS
Status: DISCONTINUED | OUTPATIENT
Start: 2024-02-20 | End: 2024-02-20 | Stop reason: HOSPADM

## 2024-02-20 RX ORDER — METHOCARBAMOL 500 MG/1
500 TABLET, FILM COATED ORAL EVERY 6 HOURS SCHEDULED
Status: DISCONTINUED | OUTPATIENT
Start: 2024-02-21 | End: 2024-02-23 | Stop reason: HOSPADM

## 2024-02-20 RX ORDER — HYDROMORPHONE HYDROCHLORIDE 1 MG/ML
0.2 INJECTION, SOLUTION INTRAMUSCULAR; INTRAVENOUS; SUBCUTANEOUS EVERY 5 MIN PRN
Status: DISCONTINUED | OUTPATIENT
Start: 2024-02-20 | End: 2024-02-20 | Stop reason: HOSPADM

## 2024-02-20 RX ORDER — DEXTROSE MONOHYDRATE 100 MG/ML
0.3 INJECTION, SOLUTION INTRAVENOUS ONCE AS NEEDED
Status: DISCONTINUED | OUTPATIENT
Start: 2024-02-20 | End: 2024-02-23 | Stop reason: HOSPADM

## 2024-02-20 RX ORDER — FENTANYL CITRATE 50 UG/ML
INJECTION, SOLUTION INTRAMUSCULAR; INTRAVENOUS AS NEEDED
Status: DISCONTINUED | OUTPATIENT
Start: 2024-02-20 | End: 2024-02-20

## 2024-02-20 RX ORDER — LIDOCAINE HYDROCHLORIDE 10 MG/ML
0.1 INJECTION INFILTRATION; PERINEURAL ONCE
Status: DISCONTINUED | OUTPATIENT
Start: 2024-02-20 | End: 2024-02-20 | Stop reason: HOSPADM

## 2024-02-20 RX ORDER — HYDROMORPHONE HYDROCHLORIDE 1 MG/ML
0.5 INJECTION, SOLUTION INTRAMUSCULAR; INTRAVENOUS; SUBCUTANEOUS EVERY 5 MIN PRN
Status: DISCONTINUED | OUTPATIENT
Start: 2024-02-20 | End: 2024-02-20 | Stop reason: HOSPADM

## 2024-02-20 RX ORDER — DEXTROSE 50 % IN WATER (D50W) INTRAVENOUS SYRINGE
25
Status: DISCONTINUED | OUTPATIENT
Start: 2024-02-20 | End: 2024-02-23 | Stop reason: HOSPADM

## 2024-02-20 RX ORDER — ROCURONIUM BROMIDE 10 MG/ML
INJECTION, SOLUTION INTRAVENOUS AS NEEDED
Status: DISCONTINUED | OUTPATIENT
Start: 2024-02-20 | End: 2024-02-20

## 2024-02-20 RX ADMIN — ROCURONIUM BROMIDE 20 MG: 10 INJECTION, SOLUTION INTRAVENOUS at 17:22

## 2024-02-20 RX ADMIN — SODIUM CHLORIDE, POTASSIUM CHLORIDE, SODIUM LACTATE AND CALCIUM CHLORIDE 75 ML/HR: 600; 310; 30; 20 INJECTION, SOLUTION INTRAVENOUS at 23:51

## 2024-02-20 RX ADMIN — HYDROMORPHONE HYDROCHLORIDE 0.5 MG: 1 INJECTION, SOLUTION INTRAMUSCULAR; INTRAVENOUS; SUBCUTANEOUS at 22:32

## 2024-02-20 RX ADMIN — INSULIN LISPRO 1 UNITS: 100 INJECTION, SOLUTION INTRAVENOUS; SUBCUTANEOUS at 23:49

## 2024-02-20 RX ADMIN — OXYCODONE HYDROCHLORIDE 10 MG: 5 TABLET ORAL at 01:48

## 2024-02-20 RX ADMIN — PIPERACILLIN SODIUM AND TAZOBACTAM SODIUM 3.38 G: 3; .375 INJECTION, SOLUTION INTRAVENOUS at 09:33

## 2024-02-20 RX ADMIN — PROPOFOL 10 MG: 10 INJECTION, EMULSION INTRAVENOUS at 20:24

## 2024-02-20 RX ADMIN — PIPERACILLIN SODIUM AND TAZOBACTAM SODIUM 3.38 G: 3; .375 INJECTION, SOLUTION INTRAVENOUS at 16:30

## 2024-02-20 RX ADMIN — LABETALOL HYDROCHLORIDE 5 MG: 5 INJECTION, SOLUTION INTRAVENOUS at 22:24

## 2024-02-20 RX ADMIN — LIDOCAINE HYDROCHLORIDE 100 MG: 20 INJECTION, SOLUTION INFILTRATION; PERINEURAL at 16:07

## 2024-02-20 RX ADMIN — HYDROMORPHONE HYDROCHLORIDE 0.4 MG: 1 INJECTION, SOLUTION INTRAMUSCULAR; INTRAVENOUS; SUBCUTANEOUS at 19:33

## 2024-02-20 RX ADMIN — Medication 140 MG: at 16:07

## 2024-02-20 RX ADMIN — SODIUM CHLORIDE, POTASSIUM CHLORIDE, SODIUM LACTATE AND CALCIUM CHLORIDE 100 ML/HR: 600; 310; 30; 20 INJECTION, SOLUTION INTRAVENOUS at 21:16

## 2024-02-20 RX ADMIN — ROCURONIUM BROMIDE 20 MG: 10 INJECTION, SOLUTION INTRAVENOUS at 18:36

## 2024-02-20 RX ADMIN — HYDROMORPHONE HYDROCHLORIDE 0.5 MG: 1 INJECTION, SOLUTION INTRAMUSCULAR; INTRAVENOUS; SUBCUTANEOUS at 21:40

## 2024-02-20 RX ADMIN — OXYCODONE HYDROCHLORIDE 10 MG: 5 TABLET ORAL at 12:26

## 2024-02-20 RX ADMIN — METHOCARBAMOL 500 MG: 500 TABLET ORAL at 23:47

## 2024-02-20 RX ADMIN — OXYCODONE HYDROCHLORIDE 10 MG: 5 TABLET ORAL at 23:46

## 2024-02-20 RX ADMIN — SUGAMMADEX 200 MG: 100 INJECTION, SOLUTION INTRAVENOUS at 21:05

## 2024-02-20 RX ADMIN — HYDROMORPHONE HYDROCHLORIDE 0.6 MG: 1 INJECTION, SOLUTION INTRAMUSCULAR; INTRAVENOUS; SUBCUTANEOUS at 19:03

## 2024-02-20 RX ADMIN — OXYCODONE HYDROCHLORIDE 10 MG: 5 TABLET ORAL at 08:17

## 2024-02-20 RX ADMIN — HYDROMORPHONE HYDROCHLORIDE 0.5 MG: 1 INJECTION, SOLUTION INTRAMUSCULAR; INTRAVENOUS; SUBCUTANEOUS at 21:58

## 2024-02-20 RX ADMIN — FENTANYL CITRATE 50 MCG: 50 INJECTION, SOLUTION INTRAMUSCULAR; INTRAVENOUS at 16:55

## 2024-02-20 RX ADMIN — PROPOFOL 20 MG: 10 INJECTION, EMULSION INTRAVENOUS at 21:10

## 2024-02-20 RX ADMIN — ACETAMINOPHEN 650 MG: 325 TABLET ORAL at 00:59

## 2024-02-20 RX ADMIN — DROPERIDOL 0.62 MG: 2.5 INJECTION, SOLUTION INTRAMUSCULAR; INTRAVENOUS at 21:40

## 2024-02-20 RX ADMIN — SODIUM CHLORIDE, SODIUM LACTATE, POTASSIUM CHLORIDE, AND CALCIUM CHLORIDE: 600; 310; 30; 20 INJECTION, SOLUTION INTRAVENOUS at 16:06

## 2024-02-20 RX ADMIN — DEXAMETHASONE SODIUM PHOSPHATE 6 MG: 4 INJECTION, SOLUTION INTRAMUSCULAR; INTRAVENOUS at 16:26

## 2024-02-20 RX ADMIN — FENTANYL CITRATE 50 MCG: 50 INJECTION, SOLUTION INTRAMUSCULAR; INTRAVENOUS at 16:07

## 2024-02-20 RX ADMIN — PROPOFOL 150 MG: 10 INJECTION, EMULSION INTRAVENOUS at 16:07

## 2024-02-20 RX ADMIN — ENOXAPARIN SODIUM 40 MG: 100 INJECTION SUBCUTANEOUS at 13:42

## 2024-02-20 RX ADMIN — PIPERACILLIN SODIUM AND TAZOBACTAM SODIUM 3.38 G: 3; .375 INJECTION, SOLUTION INTRAVENOUS at 03:04

## 2024-02-20 RX ADMIN — MIDAZOLAM HYDROCHLORIDE 2 MG: 1 INJECTION, SOLUTION INTRAMUSCULAR; INTRAVENOUS at 15:50

## 2024-02-20 RX ADMIN — PIPERACILLIN SODIUM AND TAZOBACTAM SODIUM 3.38 G: 3; .375 INJECTION, SOLUTION INTRAVENOUS at 20:17

## 2024-02-20 RX ADMIN — HYDROMORPHONE HYDROCHLORIDE 0.5 MG: 1 INJECTION, SOLUTION INTRAMUSCULAR; INTRAVENOUS; SUBCUTANEOUS at 21:52

## 2024-02-20 RX ADMIN — ONDANSETRON 4 MG: 2 INJECTION, SOLUTION INTRAMUSCULAR; INTRAVENOUS at 20:12

## 2024-02-20 RX ADMIN — ROCURONIUM BROMIDE 60 MG: 10 INJECTION, SOLUTION INTRAVENOUS at 16:13

## 2024-02-20 RX ADMIN — POLYETHYLENE GLYCOL 3350 17 G: 17 POWDER, FOR SOLUTION ORAL at 09:32

## 2024-02-20 RX ADMIN — PROPOFOL 20 MG: 10 INJECTION, EMULSION INTRAVENOUS at 21:25

## 2024-02-20 SDOH — SOCIAL STABILITY: SOCIAL INSECURITY: ARE YOU OR HAVE YOU BEEN THREATENED OR ABUSED PHYSICALLY, EMOTIONALLY, OR SEXUALLY BY ANYONE?: NO

## 2024-02-20 SDOH — SOCIAL STABILITY: SOCIAL INSECURITY: DOES ANYONE TRY TO KEEP YOU FROM HAVING/CONTACTING OTHER FRIENDS OR DOING THINGS OUTSIDE YOUR HOME?: NO

## 2024-02-20 SDOH — SOCIAL STABILITY: SOCIAL INSECURITY: DO YOU FEEL ANYONE HAS EXPLOITED OR TAKEN ADVANTAGE OF YOU FINANCIALLY OR OF YOUR PERSONAL PROPERTY?: NO

## 2024-02-20 SDOH — SOCIAL STABILITY: SOCIAL INSECURITY: ARE THERE ANY APPARENT SIGNS OF INJURIES/BEHAVIORS THAT COULD BE RELATED TO ABUSE/NEGLECT?: NO

## 2024-02-20 SDOH — HEALTH STABILITY: MENTAL HEALTH: CURRENT SMOKER: 0

## 2024-02-20 SDOH — SOCIAL STABILITY: SOCIAL INSECURITY: ABUSE: ADULT

## 2024-02-20 SDOH — SOCIAL STABILITY: SOCIAL INSECURITY: HAS ANYONE EVER THREATENED TO HURT YOUR FAMILY OR YOUR PETS?: NO

## 2024-02-20 SDOH — SOCIAL STABILITY: SOCIAL INSECURITY: HAVE YOU HAD THOUGHTS OF HARMING ANYONE ELSE?: NO

## 2024-02-20 SDOH — SOCIAL STABILITY: SOCIAL INSECURITY: DO YOU FEEL UNSAFE GOING BACK TO THE PLACE WHERE YOU ARE LIVING?: NO

## 2024-02-20 ASSESSMENT — PAIN SCALES - GENERAL
PAINLEVEL_OUTOF10: 9
PAINLEVEL_OUTOF10: 8
PAINLEVEL_OUTOF10: 7
PAINLEVEL_OUTOF10: 4
PAINLEVEL_OUTOF10: 8
PAINLEVEL_OUTOF10: 5 - MODERATE PAIN
PAINLEVEL_OUTOF10: 7
PAINLEVEL_OUTOF10: 10 - WORST POSSIBLE PAIN
PAINLEVEL_OUTOF10: 10 - WORST POSSIBLE PAIN
PAINLEVEL_OUTOF10: 7
PAINLEVEL_OUTOF10: 9
PAINLEVEL_OUTOF10: 10 - WORST POSSIBLE PAIN
PAINLEVEL_OUTOF10: 10 - WORST POSSIBLE PAIN
PAINLEVEL_OUTOF10: 7
PAINLEVEL_OUTOF10: 10 - WORST POSSIBLE PAIN

## 2024-02-20 ASSESSMENT — PAIN - FUNCTIONAL ASSESSMENT

## 2024-02-20 ASSESSMENT — ACTIVITIES OF DAILY LIVING (ADL)
BATHING: INDEPENDENT
ADEQUATE_TO_COMPLETE_ADL: YES
PATIENT'S MEMORY ADEQUATE TO SAFELY COMPLETE DAILY ACTIVITIES?: YES
LACK_OF_TRANSPORTATION: NO
TOILETING: INDEPENDENT
GROOMING: INDEPENDENT
JUDGMENT_ADEQUATE_SAFELY_COMPLETE_DAILY_ACTIVITIES: YES
DRESSING YOURSELF: INDEPENDENT
FEEDING YOURSELF: INDEPENDENT
HEARING - RIGHT EAR: FUNCTIONAL
HEARING - LEFT EAR: FUNCTIONAL
WALKS IN HOME: INDEPENDENT

## 2024-02-20 ASSESSMENT — COGNITIVE AND FUNCTIONAL STATUS - GENERAL
PATIENT BASELINE BEDBOUND: NO
DAILY ACTIVITIY SCORE: 24
MOBILITY SCORE: 24

## 2024-02-20 ASSESSMENT — LIFESTYLE VARIABLES
HOW OFTEN DO YOU HAVE A DRINK CONTAINING ALCOHOL: NEVER
HOW MANY STANDARD DRINKS CONTAINING ALCOHOL DO YOU HAVE ON A TYPICAL DAY: PATIENT DOES NOT DRINK
SKIP TO QUESTIONS 9-10: 1
AUDIT-C TOTAL SCORE: 0
HOW OFTEN DO YOU HAVE 6 OR MORE DRINKS ON ONE OCCASION: NEVER
AUDIT-C TOTAL SCORE: 0

## 2024-02-20 ASSESSMENT — PAIN DESCRIPTION - ORIENTATION
ORIENTATION: RIGHT;UPPER
ORIENTATION: MID
ORIENTATION: RIGHT;UPPER

## 2024-02-20 ASSESSMENT — PAIN DESCRIPTION - LOCATION
LOCATION: ABDOMEN

## 2024-02-20 ASSESSMENT — PATIENT HEALTH QUESTIONNAIRE - PHQ9
1. LITTLE INTEREST OR PLEASURE IN DOING THINGS: NOT AT ALL
2. FEELING DOWN, DEPRESSED OR HOPELESS: NOT AT ALL
SUM OF ALL RESPONSES TO PHQ9 QUESTIONS 1 & 2: 0

## 2024-02-20 ASSESSMENT — PAIN DESCRIPTION - DESCRIPTORS
DESCRIPTORS: SHARP

## 2024-02-20 NOTE — ANESTHESIA PREPROCEDURE EVALUATION
Patient: Dk Alas    Procedure Information       Date/Time: 02/20/24 1200    Procedure: Cholecystectomy Laparoscopy with Cholangiogram    Location: [facility] OR 11 / [facility]    Surgeons: Gómez Leroy, DO            Relevant Problems   Anesthesia (within normal limits)      Cardiovascular   (+) HTN (hypertension)      Endocrine   (+) Diabetes mellitus, type 2 (CMS/HCC)   (+) Hypothyroidism      GI   (+) Pancreatitis      /Renal (within normal limits)      Neuro/Psych   (+) ADHD   (+) Anxiety   (+) Depression      Pulmonary (within normal limits)      GI/Hepatic (within normal limits)      Hematology   (+) Anemia      Musculoskeletal  Right shoulder pain w limited ROM      Eyes, Ears, Nose, and Throat (within normal limits)      Infectious Disease (within normal limits)       Clinical information reviewed:   Tobacco  Allergies  Meds   Med Hx  Surg Hx   Fam Hx  Soc Hx        NPO Detail:  NPO/Void Status  Date of Last Liquid: 02/20/24  Time of Last Liquid: 1200  Date of Last Solid: 02/16/24  Last Intake Type: Clear fluids         Physical Exam    Airway  Mallampati: I  TM distance: >3 FB  Neck ROM: full     Cardiovascular - normal exam     Dental - normal exam     Pulmonary - normal exam     Abdominal   Abdomen: tender  Bowel sounds: normal           Anesthesia Plan    History of general anesthesia?: yes  History of complications of general anesthesia?: no    ASA 3     general     The patient is not a current smoker.  Patient did not smoke on day of procedure.  Education provided regarding risk of obstructive sleep apnea.  intravenous induction   Postoperative administration of opioids is intended.  Trial extubation is planned.  Anesthetic plan and risks discussed with patient.  Use of blood products discussed with patient who.    Plan discussed with attending.

## 2024-02-20 NOTE — ANESTHESIA PROCEDURE NOTES
Airway  Date/Time: 2/20/2024 4:12 PM  Urgency: elective    Airway not difficult    Staffing  Performed: resident   Authorized by: Calvin Link MD    Performed by: Alberto Purcell MD  Patient location during procedure: OR    Indications and Patient Condition  Indications for airway management: anesthesia and airway protection  Spontaneous Ventilation: absent  Sedation level: deep  Preoxygenated: yes  Patient position: sniffing  MILS maintained throughout  Mask difficulty assessment: 0 - not attempted  Planned trial extubation    Final Airway Details  Final airway type: endotracheal airway      Successful airway: ETT  Cuffed: yes   Successful intubation technique: direct laryngoscopy  Facilitating devices/methods: intubating stylet  Endotracheal tube insertion site: oral  Blade: Day  Blade size: #4  ETT size (mm): 7.5  Cormack-Lehane Classification: grade I - full view of glottis  Placement verified by: chest auscultation and capnometry   Measured from: gums  ETT to gums (cm): 22  Number of attempts at approach: 1  Number of other approaches attempted: 0    Additional Comments  Easy airway. Teeth in pre-anesthetic condition.

## 2024-02-20 NOTE — PROGRESS NOTES
"[facility]  [facility]  CONSULT FOLLOW-UP     Reason For Consult  evaluation of possible CBD stricture due to patient's history     SUBJECTIVE     This morning his pain is well controlled. Denied n/v.    EXAM     Last Recorded Vitals  Blood pressure 105/64, pulse 77, temperature 36.6 °C (97.9 °F), temperature source Temporal, resp. rate 18, height 1.778 m (5' 10\"), weight 100 kg (220 lb 5.6 oz), SpO2 91 %.      Intake/Output Summary (Last 24 hours) at 2/20/2024 0941  Last data filed at 2/20/2024 0456  Gross per 24 hour   Intake 1000 ml   Output --   Net 1000 ml       Physical Exam   GENERAL: In no acute distress.  NEUROLOGIC: A and O x 3. Cranial nerves 2 through 12 grossly intact. Intact motor and sensory systems, with normal reflexes and coordination.    HEENT: Pupils are equal, round, and reactive to light and accommodation. Extraocular motion intact. Scleral icterus.  CARDIAC: S1 + S2, RRR, no M/R/G.    LUNGS: CTA BL. No wheezes or coarse breath sounds. Symmetric respirations. No increased work of breathing.   ABDOMEN: Tender to palpation in RUQ. No rebound or guarding.  SKIN: Clean, warm, dry, and intact with normal skin turgor.  PSYCH: Appropriate mood and behavior.      OBJECTIVE                                                                              Medications             Current Facility-Administered Medications:     acetaminophen (Tylenol) tablet 650 mg, 650 mg, oral, q6h, Rocky Mae MD, 650 mg at 02/20/24 0059    enoxaparin (Lovenox) syringe 40 mg, 40 mg, subcutaneous, q24h, Rocky Mae MD, 40 mg at 02/19/24 1342    HYDROmorphone (Dilaudid) injection 0.2 mg, 0.2 mg, intravenous, q4h PRN, Rocky Mae MD, 0.2 mg at 02/19/24 1532    lactated Ringer's infusion, 100 mL/hr, intravenous, Continuous, Rocky Mae MD, Last Rate: 100 mL/hr at 02/20/24 0456, 100 mL/hr at 02/20/24 0456    ondansetron (Zofran) injection 4 mg, 4 mg, intravenous, q6h PRN, Rocky Mae, " MD    oxyCODONE (Roxicodone) immediate release tablet 10 mg, 10 mg, oral, q4h PRN, Rocky Mae MD, 10 mg at 02/20/24 0817    oxyCODONE (Roxicodone) immediate release tablet 5 mg, 5 mg, oral, q4h PRN, Rocky Mae MD    piperacillin-tazobactam-dextrose (Zosyn) IV 3.375 g, 3.375 g, intravenous, q6h, Los Trujillo DO, Last Rate: 100 mL/hr at 02/20/24 0933, 3.375 g at 02/20/24 0933    polyethylene glycol (Glycolax, Miralax) packet 17 g, 17 g, oral, Daily, Rocky Mae MD, 17 g at 02/20/24 0932                                                                            Labs     Results for orders placed or performed during the hospital encounter of 02/19/24 (from the past 24 hour(s))   CBC   Result Value Ref Range    WBC 10.0 4.4 - 11.3 x10*3/uL    nRBC 0.0 0.0 - 0.0 /100 WBCs    RBC 4.15 (L) 4.50 - 5.90 x10*6/uL    Hemoglobin 11.2 (L) 13.5 - 17.5 g/dL    Hematocrit 35.2 (L) 41.0 - 52.0 %    MCV 85 80 - 100 fL    MCH 27.0 26.0 - 34.0 pg    MCHC 31.8 (L) 32.0 - 36.0 g/dL    RDW 12.9 11.5 - 14.5 %    Platelets 309 150 - 450 x10*3/uL   Comprehensive metabolic panel   Result Value Ref Range    Glucose 160 (H) 74 - 99 mg/dL    Sodium 134 (L) 136 - 145 mmol/L    Potassium 4.3 3.5 - 5.3 mmol/L    Chloride 98 98 - 107 mmol/L    Bicarbonate 27 21 - 32 mmol/L    Anion Gap 13 10 - 20 mmol/L    Urea Nitrogen 12 6 - 23 mg/dL    Creatinine 0.88 0.50 - 1.30 mg/dL    eGFR >90 >60 mL/min/1.73m*2    Calcium 8.9 8.6 - 10.6 mg/dL    Albumin 3.5 3.4 - 5.0 g/dL    Alkaline Phosphatase 200 (H) 33 - 120 U/L    Total Protein 6.3 (L) 6.4 - 8.2 g/dL    AST 55 (H) 9 - 39 U/L    Bilirubin, Total 3.7 (H) 0.0 - 1.2 mg/dL    ALT 50 10 - 52 U/L   Magnesium   Result Value Ref Range    Magnesium 1.82 1.60 - 2.40 mg/dL                                                                            Imaging     02/19/2024 MRCP:  IMPRESSION:  1. Constellation of findings consistent with acute cholecystitis.  2. Common bile duct is not dilated and without evidence of  stricture.  3. Unchanged cystic lesions of the pancreatic head likely  representing side-branch IPMNs, continued attention on follow-up  imaging is recommended.                                                                         GI Procedures     EUS and ERCP 11/28/23 with findings of a mediastinal mass, multiple enlarged abdominal lymph nodes and localized biliary stricture. Biopsies of the mediastinal mass and LN were unrevealing, including repeat EUS with FNB 12/11.      A. MEDIASTINUM, FINE NEEDLE ASPIRATION POSTERIOR, CYTOLOGY AND CELL BLOCK:    --  No carcinoma cells identified.  -- Limited lymphoid sample with predominantly necrotic/degenerated material and amorphous debris with rare giant cells and histiocytic aggregates,  see note.  -- GMS and AFB stains are negative for micro organisms.  -- Please correlate with concurrent surgical specimen, [id number].     Note: The specimen consists predominantly of necrotic and amorphous debris with rare giant cells and histiocytic aggregates suggestive of possible granulomas. Rare fragments of benign squamous epithelium present, possibility of contamination due to procedure cannot be excluded.  Please correlate with microbial cultures. Clinical and radiologic correlation is recommended.      B. LYMPH NODE, PERIPANCREATIC FINE NEEDLE ASPIRATION RIGHT - ELIAS PORTAL LYMPH NODE, CYTOLOGY AND CELL BLOCK:   -- No malignant cells identified.  -- Lymphoid sample.     A.  Mediastinal mass, biopsy:  -Detached fragments of necrotic and hyalinized debris, soft tissue and lymphoid tissue.  See note     Note:  Multiple fragments of soft tissue, lymphoid tissue (immunoreactive with CD3 and CD20), necrotic debris and hyalinized fragments are seen.  CD68 highlights macrophages surrounding small fragments of hyalinized tissue.  These findings are nonspecific but are suspicious for poorly formed granulomas involving soft tissue/lymphoid tissue.  GMS and AFB histochemical stains are  negative.  Findings not might not be representative of the clinically identified lesion.  Clinical correlation and correlation with cultures, if available, are strongly recommended.  Correlate with concomitant cytology results.     12/20/2023 ERCP:  Impression  The major papilla had a bulging appearance.  Complete major papilla sphincterotomy was performed.  Localized, intrinsic and severe stricture was visualized in the mid common bile duct. The main bile duct was relatively small in caliber. There were no intraductal stones/sludge.  Multiple small gallstones were noted in the gallbladder.   Performed brushings with cytology brush in the common bile duct  One 10 Fr x 9 cm double flanged stent was placed in the proximal common bile duct     Findings  A  film of the abdomen was obtained and was normal.   The esophagus was successfully intubated under direct vision without detailed examination of the pharynx, larynx, and associated structures, and upper GI tract.  The upper GI tract was grossly normal.    The duodenoscope was advanced to the descending duodenum. Inspection of the major papilla was performed. The major papilla was native and had a slightly bulging appearance.   A 0.025 inch x 270 cm straight Visiglide wire was passed into the biliary tree.  The Fusion OMNI sphincterotome was passed over the guidewire and the bile duct was then deeply cannulated.  Contrast was injected.  I personally interpreted the bile duct images.  There was brisk flow of contrast through the ducts.  Image quality was excellent.  Contrast extended to the hepatic ducts.  Localized, intrinsic and severe stricture was visualized in the mid common bile duct. The stricture was traversable. But the entire main bile duct was relatively small in caliber with no filling defects. The cystic duct had a low insertion and was long. The gallbladder contained what appeared to be numerous small stones.   Am 8 mm biliary sphincterotomy was made  with a monofilament traction (standard) sphincterotome using ERBE electrocautery.  There was no post-sphincterotomy bleeding.    The biliary tree was swept with a 12 mm balloon starting at the bifurcation.  Nothing was found.   Brushings for cytology were performed in the mid-bile duct stricture.   One 10 Fr by 9 cm temporary plastic stent with a single external flap and a single internal flap was placed 8 cm into the common bile duct.  Bile flowed through the stent.  The stent was in good position.    There was prompt drainage of bile and contrast at the end of the procedure.   The pancreatic duct was neither cannulated nor injected during the procedure.      01/09/2024 ERCP  Impression  One patent 10 Fr double flanged stent was visualized in the common bile duct. The stent was removed  Previous biliary sphincterotomy was wide open.  Cholangiogram showed a completely normal bile duct, with resolved prior biliary stenosis.   Multiple sweeps were performed confirming no biliary sludge/stones and no stenosis.      Findings  A  film of the abdomen was obtained. One biliary stent was seen in the right upper quadrant.   The esophagus was successfully intubated under direct vision without detailed examination of the pharynx, larynx, and associated structures, and upper GI tract.  The upper GI tract was grossly normal.    The duodenoscope was advanced to the descending duodenum. Inspection of the major papilla was performed. One temporary plastic stent originating in the biliary tree was seen emerging from the major papilla.  The stent was patent and was removed from the biliary tree using a snare.   A biliary sphincterotomy had been performed.  The sphincterotomy appeared open.    A 0.025 inch x 270 cm straight Visiglide wire was passed into the biliary tree.  The extraction balloon was passed over the guidewire and the bile duct was then deeply cannulated.  Contrast was injected.  I personally interpreted the bile  duct images.  There was brisk flow of contrast through the ducts.  Image quality was excellent.  Contrast extended to the hepatic ducts.  The bile duct appeared completely normal. Previously seen stricture was resolved. There was no ductal dilation or filling defects. The cystic duct insertion site was seen and was patent.   The biliary tree was swept with a 12 mm balloon starting at the bifurcation.  Nothing was found.   Final occlusion balloon cholangiogram confirmed a completely normal appearing bile duct.   There was prompt drainage of bile and contrast at the end of the procedure.   The pancreatic duct was neither cannulated nor injected during the procedure.    ASSESSMENT / PLAN     ASSESSMENT/PLAN:    Dk Alas is a 43 y.o. male with a past medical history of posterior mediastinal mass s/p resection w/o evidence of malignancy (it might have been an infected bronchogenic cyst), HTN, DLD, T2DM, depression, anxiety, and ADHD, admitted on 2/19/2024 with abdominal pain.      GI is consulted for evaluation of possible CBD stricture due to patient's history.     2/20/24 MRCP showing acute cholecystitis, CBD is not dilated and w/o evidence of stricture, side-branch IPMNs; which goes along w/ cholangiogram on the 01/09/2024, which showed a completely normal bile duct, with resolved prior biliary stenosis. Previous biliary stricture might have been from extrinsic compression from reactive lymphadenopathy related to infection in the setting of the mediastinal mass.      Recommendations:  - No indication for ERCP at this time. Agree with cholecystectomy.    >>> side-branch IPMNs  - When the patient is ready for discharge he will need follow up with his local GI at [place]. If interested he can also be referred to [facility] at [facility].     The above recommendations were communicated to the [facility].    Patient was seen and discussed with Dr. Daniels.    Gastroenterology will sign off.    Ruth Ayala  MD  PGY-4 Gastroenterology and Hepatology Fellow  [facility] [place]

## 2024-02-20 NOTE — PROGRESS NOTES
Dk Alas is a 43 y.o. male on day 1 of admission presenting with Acute cholecystitis.      DC Planning:  Went in and met with the pt, confirmed demographics.   Pt lives at home with his brother.   No home going needs anticipated for this pt at this time.     LOIDA RODRIGUEZ

## 2024-02-20 NOTE — H&P
Interval H&P  H&P reviewed and no significant changes. Plan for OR today for cholecystectomy.    Serena Hirsch MD  General Surgery  [id number]    [facility]  ACUTE CARE SURGERY - HISTORY AND PHYSICAL / CONSULT     Patient Name: Dk Alas  MRN: [id number]  Admit Date: 219  : 1980                      AGE: 43 y.o.                             GENDER: male  ==============================================================================  TODAY'S ASSESSMENT AND PLAN OF CARE:  Pt is a 44yo M with hx of cholelithiasis and CBD stenosis s/p stent with interval resolution and removal of stent 1 month ago who presents with 3 days of RUQ and epigastric pain with elevated T.bili to 2.7, without fever or AMS, and positive Mcelroy's sign. On CT, he appears to have cholecystitis and non-dilated CBD.      Admit to ACS; discussing lap possible open cholecystectomy with intra-op cholangiogram tomorrow     - scheduled PO tylenol, PRN oxy, Dilaudid breakthrough  - IS, ambulate TID  - NPO, LR 100cc/hr  - GI consult for evaluation for ERCP  - DVT ppx with lovenox 40 daily  - zosyn 3.375 q6hr for empiric abx for cholecystitis      Discussed with Attending, Dr. Leroy.      Rocky Mae MD   Pager [phone]      ==============================================================================  CHIEF COMPLAINT/REASON FOR CONSULT:  Pt is a 44yo M transferred from [facility] for concern for cholecystitis. Pt initially presented to ED 2023 with epigastric pain that started within 1 hr of food consumption and was constant and worsening. T.bili at the time was 7.1. RUQ US showed cholelithiasis, GB wall 2mm, CBD 4 mm. ERCP was done to place stent for CBD stricture. Pt reported feeling immediate relief of his pain. Part of his workup included a CT chest which showed a posterior mediastinal mass, for which he underwent robotic-assisted posterior mediastinal mass resection and RLL wedge resection (Granuloma) on   with Dr. Benitez. Repeat ERCP 2024 shows resolved biliary stenosis and removal of CBD stent at which time T.bili was 1.1. Pt now presents with 3 days of the same epigastric pain that he had prior to ERCP stent placement.      Reports nausea, emesis x1, not bloody not bilious. Denies fevers, chills. Last BM 2 days ago which is abnormal from his baseline of a few BM daily. Not passing gas.      In ED, pt is not tachycardic, or hypotensive but has leukocytosis to 15.6, T.bili to 2.7.      PAST MEDICAL HISTORY:   PMH: ADHD, hypothyroidism, HTN, DM2 since , weight loss 10 lbs since ERCP.        PSH: see above   Surgical History         Past Surgical History:   Procedure Laterality Date    BILE DUCT STENT PLACEMENT         Stent placed 2023 for narrowing of CBD    CT ANGIO CHEST FOR PULMONARY EMBOLISM         CT scan on 23    CT ANGIO CORONARY ART WITH HEARTFLOW IF SCORE >30%   10/26/2021     CT ANGIO CORONARY ART WITH HEARTFLOW IF SCORE >30% 10/26/2021         FH:   Family History          Family History   Problem Relation Name Age of Onset    Diabetes Mother        Hypertension Mother        Heart attack Mother        Heart attack Maternal Grandmother             SOCIAL HISTORY:    Smokin pack-years; quit 3 years ago   Social History           Tobacco Use   Smoking Status Former    Types: Cigarettes    Quit date:     Years since quittin.1   Smokeless Tobacco Never        Alcohol:    Social History          Substance and Sexual Activity   Alcohol Use Never             MEDICATIONS:           Prior to Admission medications    Medication Sig Start Date End Date Taking? Authorizing Provider   acetaminophen (Tylenol) 325 mg tablet Take 2 tablets (650 mg) by mouth every 4 hours if needed for mild pain (1 - 3) or fever (temp greater than 38.0 C). 24     Ama Dodson PA-C   amphetamine-dextroamphetamine (Adderall) 15 mg tablet Take 1 tablet (15 mg) by mouth once daily in the morning. Do not  crush or chew.       Historical Provider, MD   atorvastatin (Lipitor) 20 mg tablet Take 1 tablet (20 mg) by mouth once daily.       Historical Provider, MD   buPROPion XL (Wellbutrin XL) 300 mg 24 hr tablet Take 1 tablet (300 mg) by mouth once daily. Do not crush, chew, or split.       Historical Provider, MD   busPIRone (Buspar) 5 mg tablet Take by mouth 3 times a day as needed.       Historical Provider, MD   cyanocobalamin (Vitamin B-12) 250 mcg tablet Take by mouth once daily.       Historical Provider, MD   docosahexaenoic acid/epa (FISH OIL ORAL) Take by mouth.       Historical Provider, MD   flaxseed oiL oil         Historical Provider, MD   levothyroxine (Synthroid, Levoxyl) 200 mcg tablet Take 1 tablet (200 mcg) by mouth once daily in the morning. Take before meals.       Historical Provider, MD   lisinopril 10 mg tablet Take 1 tablet (10 mg) by mouth once daily.       Historical Provider, MD   metFORMIN (Glucophage) 500 mg tablet Take 1 tablet (500 mg) by mouth. Take 1 or 2 tablets daily at bedtime. Do not crush, chew, or split.       Historical Provider, MD   oxyCODONE (Roxicodone) 5 mg immediate release tablet Take 1 tablet (5 mg) by mouth every 6 hours if needed for moderate pain (4 - 6). 1/31/24     Ama Dodson PA-C   pimecrolimus (Elidel) 1 % cream Apply 1 Application topically once daily. To face 1/2/24     Historical Provider, MD   tirzepatide (Mounjaro) 10 mg/0.5 mL pen injector Inject 10 mg under the skin. Takes one time weekly       Historical Provider, MD      ALLERGIES:         Allergies   Allergen Reactions    Ciprofloxacin Hives and Other       Rapid heart rate, fainted    Nsaids (Non-Steroidal Anti-Inflammatory Drug) Hives         REVIEW OF SYSTEMS:  Review of Systems     See above     PHYSICAL EXAM:  Physical Exam     Physical Exam      Constitutional- moderate discomfort  Cards- regular rate   Resp- nonlabored breathing on room air   Abdomen- soft, not distended; RUQ tenderness,  positive Mcelroy's sign  Extremities- ROSE   Skin- diaphoretic  Neuro- alert and oriented x3   Psych- appropriate mood   Tubes/lines- PIV   IMAGING SUMMARY:  (summary of findings, not a copy of dictation)  See above     LABS:       Results from last 7 days   Lab Units 02/19/24  0752   WBC AUTO x10*3/uL 15.6*   HEMOGLOBIN g/dL 13.3*   HEMATOCRIT % 40.0*   PLATELETS AUTO x10*3/uL 402   NEUTROS PCT AUTO % 81.3   LYMPHS PCT AUTO % 8.0   MONOS PCT AUTO % 9.8   EOS PCT AUTO % 0.2           Results from last 7 days   Lab Units 02/19/24  0752   APTT seconds 32   INR   1.2*           Results from last 7 days   Lab Units 02/19/24  0752   SODIUM mmol/L 132*   POTASSIUM mmol/L 4.7   CHLORIDE mmol/L 97*   CO2 mmol/L 25   BUN mg/dL 11   CREATININE mg/dL 0.93   CALCIUM mg/dL 9.8   PROTEIN TOTAL g/dL 7.3   BILIRUBIN TOTAL mg/dL 2.7*   ALK PHOS U/L 112   ALT U/L 18   AST U/L 14   GLUCOSE mg/dL 186*           Results from last 7 days   Lab Units 02/19/24  0752   BILIRUBIN TOTAL mg/dL 2.7*

## 2024-02-20 NOTE — PROGRESS NOTES
"Dk Alas is a 43 y.o. male on day 1 of admission presenting with Acute cholecystitis.    TODAY'S ASSESSMENT AND PLAN OF CARE:  Pt is a 42yo M with pMHhx of HTN, DLD, T2DM, posterior mediastinal mass s/p EUS+ERCP+biopsy on 11/30/2023, cholelithiasis and CBD stenosis s/p stent with interval resolution and removal of stent 1 month ago who presents with 4 days of RUQ and epigastric pain with elevated T.bili to 2.7, without fever or AMS, and positive Mcelroy's sign. On CT, he appears to have cholecystitis and non-dilated CBD. Patient received MRCP per GI recs showing further evidence of cholecystitis with no CBD stricture. Plan for lap lori today - may be delayed to tomorrow due to scheduling.         Plan:    Neuro:  - scheduled PO tylenol, PRN oxy / dilaudid     Cards:  - vital signs q4 hours    Resp:  - satting well on RA  - IS    GI:  - NPO awaiting lap lori later today/early tomorrow   - MRCP obtained: evidence of acute cholecystitis. No evidence of CBD stricture.   - T bili up to 3.7 (up from 2.7 on 2/19), alk phos 200 (up from 112 on 2/19)    :  - continue  cc/hr  - replete lytes as indicated    Heme:  - no active issues   - Monitor H/H    ID:  - zosyn 3.375 q6hr for empiric abx for cholecystitis  - monitor CBC    DVT prophylaxis:  - lovenox 40 mg    Patient discussed with attending Dr. Limon.    Angela Whitney, MS3    Serena Hirsch MD  General Surgery  [id number]      CHIEF COMPLAINT/ EVENTS LAST 24 HOURS / HPI:  No acute events overnight. Pain is mildly improved.      MEDICAL HISTORY / ROS  Admission history reviewed. Pertinent changes as follows:   None      PHYSICAL EXAM:     Last Recorded Vitals  Blood pressure 105/64, pulse 77, temperature 36.6 °C (97.9 °F), temperature source Temporal, resp. rate 18, height 1.778 m (5' 10\"), weight 100 kg (220 lb 5.6 oz), SpO2 91 %.  Intake/Output last 3 Shifts:  I/O last 3 completed shifts:  In: 1000 (10 mL/kg) [I.V.:1000 (10 mL/kg)]  Out: - (0 mL/kg)   Weight: 99.9 kg "     Physical Exam:   Constitutional- moderate discomfort  Cards- regular rate/rhythm   Resp- nonlabored breathing on room air   Abdomen- soft, not distended; RUQ tenderness, positive Mcelroy's sign  Extremities- ROSE   Skin- diaphoretic  Neuro- alert and oriented x3   Psych- appropriate mood   Tubes/lines- PIV    LABS  Results for orders placed or performed during the hospital encounter of 02/19/24 (from the past 24 hour(s))   CBC   Result Value Ref Range    WBC 10.0 4.4 - 11.3 x10*3/uL    nRBC 0.0 0.0 - 0.0 /100 WBCs    RBC 4.15 (L) 4.50 - 5.90 x10*6/uL    Hemoglobin 11.2 (L) 13.5 - 17.5 g/dL    Hematocrit 35.2 (L) 41.0 - 52.0 %    MCV 85 80 - 100 fL    MCH 27.0 26.0 - 34.0 pg    MCHC 31.8 (L) 32.0 - 36.0 g/dL    RDW 12.9 11.5 - 14.5 %    Platelets 309 150 - 450 x10*3/uL   Comprehensive metabolic panel   Result Value Ref Range    Glucose 160 (H) 74 - 99 mg/dL    Sodium 134 (L) 136 - 145 mmol/L    Potassium 4.3 3.5 - 5.3 mmol/L    Chloride 98 98 - 107 mmol/L    Bicarbonate 27 21 - 32 mmol/L    Anion Gap 13 10 - 20 mmol/L    Urea Nitrogen 12 6 - 23 mg/dL    Creatinine 0.88 0.50 - 1.30 mg/dL    eGFR >90 >60 mL/min/1.73m*2    Calcium 8.9 8.6 - 10.6 mg/dL    Albumin 3.5 3.4 - 5.0 g/dL    Alkaline Phosphatase 200 (H) 33 - 120 U/L    Total Protein 6.3 (L) 6.4 - 8.2 g/dL    AST 55 (H) 9 - 39 U/L    Bilirubin, Total 3.7 (H) 0.0 - 1.2 mg/dL    ALT 50 10 - 52 U/L   Magnesium   Result Value Ref Range    Magnesium 1.82 1.60 - 2.40 mg/dL       IMAGING    MRCP pancreas w IV contrast    Result Date: 2/19/2024  STUDY: MRCP PANCREAS W IV CONTRAST;  2/19/2024 10:57 pm   INDICATION: Signs/Symptoms:evaluate CBD for stricture; going to OR first start 2/20 based on this result.   COMPARISON: CT abdomen 02/18/2024 MRI abdomen 11/30/2023   ACCESSION NUMBER(S): [id number]   ORDERING CLINICIAN: DAYANARA VILLALTA   TECHNIQUE: MRI PANCREAS; Multiplanar magnetic resonance images of the abdomen were obtained including the following sequences;  T2-weighted SSFSE with and without fat saturation, T1-weighted GRE in/opposed phase, DWI, fat saturated 3D-T1w GRE pre and dynamically post contrast. Radial thick slab T2w RARE MRCP and coronally reconstructed navigator gated high resolution 3-D T2w RESTORE MRCP with MIP reconstruction were also performed for MRCP.  20 ml of  Gadolinium contrast agent Dotarem were administered intravenously without immediate complication.   FINDINGS: LIVER: The liver is normal in size and contour. Normal enhancement. Hepatic parenchyma is isointense on T1 in and out of phase sequences.  No liver mass.   BILE DUCTS: No intrahepatic or extrahepatic bile duct dilatation is demonstrated.   GALLBLADDER: Gallbladder is distended with multiple gallstones as well as pericholecystic fluid and wall thickening.   PANCREAS: Normal signal intensity. Normal enhancement. No masses. The pancreatic duct is normal. There are cystic lesion of the pancreatic head measuring up to 1.2 cm likely representing side-branch IPMNs similar to the prior examination.   SPLEEN: The spleen is normal in size without evidence of focal lesions.   ADRENAL GLANDS: Within normal limits.   KIDNEYS: The bilateral kidneys demonstrate numerous subcentimeter well defined lesions with high signal intensity on T2w imaging and no contrast enhancement consistent with renal cysts. There is no evidence of hydroureteronephrosis.   LYMPH NODES: Note is made again of multiple alvin hepatic and peripancreatic lymph nodes measuring up to 1.5 x 1.3 cm similar to the prior examination.   ABDOMINAL VESSELS: Aorta and the major abdominal arterial vessels demonstrate no gross abnormality.  Superior mesenteric vein, splenic vein, and main, right and left portal vein are patent. Hepatic veins are patent.  No significant collaterals or esophageal varices are present.   BOWEL: Within normal limits.   PERITONEUM/RETROPERITONEUM: No ascites.   BONES AND LOWER THORAX: No abnormally enhancing focal  bony lesions are identified. Multilevel discogenic degenerative disease is noted in several levels of the thoraco- lumbar spine.  Bibasilar atelectasis is noted.       MEDS:  Scheduled medications  acetaminophen, 650 mg, oral, q6h  enoxaparin, 40 mg, subcutaneous, q24h  piperacillin-tazobactam, 3.375 g, intravenous, q6h  polyethylene glycol, 17 g, oral, Daily      Continuous medications  lactated Ringer's, 100 mL/hr, Last Rate: 100 mL/hr (02/20/24 8632)      PRN medications  PRN medications: HYDROmorphone, ondansetron, oxyCODONE, oxyCODONE     Angela Whitney, MS3      STAFF:  No changes.  Has had up/down bili but mrcp neg.  Planning for L/C with grams today by Dr. Leroy.  Monitor LFT post op.  Cliff Limon MD

## 2024-02-21 LAB
ALBUMIN SERPL BCP-MCNC: 3.4 G/DL (ref 3.4–5)
ALP SERPL-CCNC: 198 U/L (ref 33–120)
ALT SERPL W P-5'-P-CCNC: 56 U/L (ref 10–52)
ANION GAP SERPL CALC-SCNC: 15 MMOL/L (ref 10–20)
AST SERPL W P-5'-P-CCNC: 42 U/L (ref 9–39)
BILIRUB SERPL-MCNC: 2.1 MG/DL (ref 0–1.2)
BUN SERPL-MCNC: 11 MG/DL (ref 6–23)
CALCIUM SERPL-MCNC: 8.7 MG/DL (ref 8.6–10.6)
CHLORIDE SERPL-SCNC: 98 MMOL/L (ref 98–107)
CO2 SERPL-SCNC: 25 MMOL/L (ref 21–32)
CREAT SERPL-MCNC: 0.75 MG/DL (ref 0.5–1.3)
EGFRCR SERPLBLD CKD-EPI 2021: >90 ML/MIN/1.73M*2
ERYTHROCYTE [DISTWIDTH] IN BLOOD BY AUTOMATED COUNT: 12.8 % (ref 11.5–14.5)
GLUCOSE BLD MANUAL STRIP-MCNC: 148 MG/DL (ref 74–99)
GLUCOSE BLD MANUAL STRIP-MCNC: 150 MG/DL (ref 74–99)
GLUCOSE BLD MANUAL STRIP-MCNC: 156 MG/DL (ref 74–99)
GLUCOSE BLD MANUAL STRIP-MCNC: 162 MG/DL (ref 74–99)
GLUCOSE BLD MANUAL STRIP-MCNC: 174 MG/DL (ref 74–99)
GLUCOSE BLD MANUAL STRIP-MCNC: 205 MG/DL (ref 74–99)
GLUCOSE SERPL-MCNC: 157 MG/DL (ref 74–99)
HCT VFR BLD AUTO: 34.7 % (ref 41–52)
HGB BLD-MCNC: 11.4 G/DL (ref 13.5–17.5)
MAGNESIUM SERPL-MCNC: 1.89 MG/DL (ref 1.6–2.4)
MCH RBC QN AUTO: 27.7 PG (ref 26–34)
MCHC RBC AUTO-ENTMCNC: 32.9 G/DL (ref 32–36)
MCV RBC AUTO: 84 FL (ref 80–100)
NRBC BLD-RTO: 0 /100 WBCS (ref 0–0)
PLATELET # BLD AUTO: 392 X10*3/UL (ref 150–450)
POTASSIUM SERPL-SCNC: 4.4 MMOL/L (ref 3.5–5.3)
PROT SERPL-MCNC: 6.2 G/DL (ref 6.4–8.2)
RBC # BLD AUTO: 4.11 X10*6/UL (ref 4.5–5.9)
SODIUM SERPL-SCNC: 134 MMOL/L (ref 136–145)
WBC # BLD AUTO: 9.1 X10*3/UL (ref 4.4–11.3)

## 2024-02-21 PROCEDURE — 96376 TX/PRO/DX INJ SAME DRUG ADON: CPT

## 2024-02-21 PROCEDURE — 2500000002 HC RX 250 W HCPCS SELF ADMINISTERED DRUGS (ALT 637 FOR MEDICARE OP, ALT 636 FOR OP/ED)

## 2024-02-21 PROCEDURE — 36415 COLL VENOUS BLD VENIPUNCTURE: CPT

## 2024-02-21 PROCEDURE — 2500000004 HC RX 250 GENERAL PHARMACY W/ HCPCS (ALT 636 FOR OP/ED): Performed by: NURSE PRACTITIONER

## 2024-02-21 PROCEDURE — 83735 ASSAY OF MAGNESIUM: CPT

## 2024-02-21 PROCEDURE — 99232 SBSQ HOSP IP/OBS MODERATE 35: CPT | Performed by: NURSE PRACTITIONER

## 2024-02-21 PROCEDURE — 80053 COMPREHEN METABOLIC PANEL: CPT

## 2024-02-21 PROCEDURE — 96361 HYDRATE IV INFUSION ADD-ON: CPT

## 2024-02-21 PROCEDURE — 96366 THER/PROPH/DIAG IV INF ADDON: CPT

## 2024-02-21 PROCEDURE — 96375 TX/PRO/DX INJ NEW DRUG ADDON: CPT

## 2024-02-21 PROCEDURE — 82947 ASSAY GLUCOSE BLOOD QUANT: CPT

## 2024-02-21 PROCEDURE — 1100000001 HC PRIVATE ROOM DAILY

## 2024-02-21 PROCEDURE — 2500000001 HC RX 250 WO HCPCS SELF ADMINISTERED DRUGS (ALT 637 FOR MEDICARE OP)

## 2024-02-21 PROCEDURE — 2500000004 HC RX 250 GENERAL PHARMACY W/ HCPCS (ALT 636 FOR OP/ED)

## 2024-02-21 PROCEDURE — 85027 COMPLETE CBC AUTOMATED: CPT

## 2024-02-21 RX ORDER — ATORVASTATIN CALCIUM 20 MG/1
20 TABLET, FILM COATED ORAL DAILY
Status: DISCONTINUED | OUTPATIENT
Start: 2024-02-21 | End: 2024-02-23 | Stop reason: HOSPADM

## 2024-02-21 RX ORDER — HYDROMORPHONE HCL/0.9% NACL/PF 15 MG/30ML
PATIENT CONTROLLED ANALGESIA SYRINGE INTRAVENOUS CONTINUOUS
Status: DISCONTINUED | OUTPATIENT
Start: 2024-02-21 | End: 2024-02-22

## 2024-02-21 RX ORDER — BUSPIRONE HYDROCHLORIDE 5 MG/1
5 TABLET ORAL 3 TIMES DAILY PRN
Status: DISCONTINUED | OUTPATIENT
Start: 2024-02-21 | End: 2024-02-23 | Stop reason: HOSPADM

## 2024-02-21 RX ORDER — KETOROLAC TROMETHAMINE 30 MG/ML
15 INJECTION, SOLUTION INTRAMUSCULAR; INTRAVENOUS EVERY 6 HOURS
Status: DISCONTINUED | OUTPATIENT
Start: 2024-02-21 | End: 2024-02-21

## 2024-02-21 RX ORDER — LIDOCAINE 50 MG/G
1 PATCH TOPICAL DAILY
Status: DISCONTINUED | OUTPATIENT
Start: 2024-02-21 | End: 2024-02-21

## 2024-02-21 RX ORDER — NALOXONE HYDROCHLORIDE 0.4 MG/ML
0.2 INJECTION, SOLUTION INTRAMUSCULAR; INTRAVENOUS; SUBCUTANEOUS AS NEEDED
Status: DISCONTINUED | OUTPATIENT
Start: 2024-02-21 | End: 2024-02-23 | Stop reason: HOSPADM

## 2024-02-21 RX ORDER — BUPROPION HYDROCHLORIDE 150 MG/1
300 TABLET ORAL DAILY
Status: DISCONTINUED | OUTPATIENT
Start: 2024-02-21 | End: 2024-02-23 | Stop reason: HOSPADM

## 2024-02-21 RX ORDER — DEXTROAMPHETAMINE SACCHARATE, AMPHETAMINE ASPARTATE, DEXTROAMPHETAMINE SULFATE AND AMPHETAMINE SULFATE 2.5; 2.5; 2.5; 2.5 MG/1; MG/1; MG/1; MG/1
15 TABLET ORAL EVERY MORNING
Status: DISCONTINUED | OUTPATIENT
Start: 2024-02-21 | End: 2024-02-23 | Stop reason: HOSPADM

## 2024-02-21 RX ORDER — LIDOCAINE 560 MG/1
1 PATCH PERCUTANEOUS; TOPICAL; TRANSDERMAL DAILY
Status: DISCONTINUED | OUTPATIENT
Start: 2024-02-21 | End: 2024-02-23 | Stop reason: HOSPADM

## 2024-02-21 RX ORDER — HYDROMORPHONE HYDROCHLORIDE 1 MG/ML
0.5 INJECTION, SOLUTION INTRAMUSCULAR; INTRAVENOUS; SUBCUTANEOUS ONCE
Status: COMPLETED | OUTPATIENT
Start: 2024-02-21 | End: 2024-02-21

## 2024-02-21 RX ORDER — LEVOTHYROXINE SODIUM 100 UG/1
200 TABLET ORAL
Status: DISCONTINUED | OUTPATIENT
Start: 2024-02-22 | End: 2024-02-23 | Stop reason: HOSPADM

## 2024-02-21 RX ADMIN — HYDROMORPHONE HYDROCHLORIDE 0.2 MG: 1 INJECTION, SOLUTION INTRAMUSCULAR; INTRAVENOUS; SUBCUTANEOUS at 06:20

## 2024-02-21 RX ADMIN — SODIUM CHLORIDE, POTASSIUM CHLORIDE, SODIUM LACTATE AND CALCIUM CHLORIDE 75 ML/HR: 600; 310; 30; 20 INJECTION, SOLUTION INTRAVENOUS at 18:24

## 2024-02-21 RX ADMIN — PIPERACILLIN SODIUM AND TAZOBACTAM SODIUM 3.38 G: 3; .375 INJECTION, SOLUTION INTRAVENOUS at 15:13

## 2024-02-21 RX ADMIN — HYDROMORPHONE HYDROCHLORIDE 0.5 MG: 1 INJECTION, SOLUTION INTRAMUSCULAR; INTRAVENOUS; SUBCUTANEOUS at 02:42

## 2024-02-21 RX ADMIN — KETOROLAC TROMETHAMINE 15 MG: 30 INJECTION INTRAMUSCULAR; INTRAVENOUS at 09:43

## 2024-02-21 RX ADMIN — PIPERACILLIN SODIUM AND TAZOBACTAM SODIUM 3.38 G: 3; .375 INJECTION, SOLUTION INTRAVENOUS at 21:02

## 2024-02-21 RX ADMIN — ACETAMINOPHEN 650 MG: 325 TABLET ORAL at 06:20

## 2024-02-21 RX ADMIN — HYDROMORPHONE HYDROCHLORIDE 0.2 MG: 1 INJECTION, SOLUTION INTRAMUSCULAR; INTRAVENOUS; SUBCUTANEOUS at 00:32

## 2024-02-21 RX ADMIN — INSULIN LISPRO 1 UNITS: 100 INJECTION, SOLUTION INTRAVENOUS; SUBCUTANEOUS at 21:02

## 2024-02-21 RX ADMIN — METHOCARBAMOL 500 MG: 500 TABLET ORAL at 13:08

## 2024-02-21 RX ADMIN — PIPERACILLIN SODIUM AND TAZOBACTAM SODIUM 3.38 G: 3; .375 INJECTION, SOLUTION INTRAVENOUS at 09:43

## 2024-02-21 RX ADMIN — METHOCARBAMOL 500 MG: 500 TABLET ORAL at 05:22

## 2024-02-21 RX ADMIN — Medication: at 10:20

## 2024-02-21 RX ADMIN — METHOCARBAMOL 500 MG: 500 TABLET ORAL at 18:22

## 2024-02-21 RX ADMIN — ACETAMINOPHEN 650 MG: 325 TABLET ORAL at 13:07

## 2024-02-21 RX ADMIN — ACETAMINOPHEN 650 MG: 325 TABLET ORAL at 21:01

## 2024-02-21 RX ADMIN — INSULIN LISPRO 1 UNITS: 100 INJECTION, SOLUTION INTRAVENOUS; SUBCUTANEOUS at 05:23

## 2024-02-21 RX ADMIN — ACETAMINOPHEN 650 MG: 325 TABLET ORAL at 00:32

## 2024-02-21 RX ADMIN — INSULIN LISPRO 2 UNITS: 100 INJECTION, SOLUTION INTRAVENOUS; SUBCUTANEOUS at 18:22

## 2024-02-21 RX ADMIN — INSULIN LISPRO 1 UNITS: 100 INJECTION, SOLUTION INTRAVENOUS; SUBCUTANEOUS at 13:22

## 2024-02-21 RX ADMIN — OXYCODONE HYDROCHLORIDE 10 MG: 5 TABLET ORAL at 05:22

## 2024-02-21 RX ADMIN — ENOXAPARIN SODIUM 40 MG: 100 INJECTION SUBCUTANEOUS at 13:06

## 2024-02-21 RX ADMIN — PIPERACILLIN SODIUM AND TAZOBACTAM SODIUM 3.38 G: 3; .375 INJECTION, SOLUTION INTRAVENOUS at 02:42

## 2024-02-21 ASSESSMENT — COGNITIVE AND FUNCTIONAL STATUS - GENERAL
STANDING UP FROM CHAIR USING ARMS: A LITTLE
TOILETING: A LITTLE
DAILY ACTIVITIY SCORE: 23
MOBILITY SCORE: 24
WALKING IN HOSPITAL ROOM: A LITTLE
MOBILITY SCORE: 21
MOBILITY SCORE: 22
CLIMB 3 TO 5 STEPS WITH RAILING: A LITTLE
WALKING IN HOSPITAL ROOM: A LITTLE
DAILY ACTIVITIY SCORE: 24
CLIMB 3 TO 5 STEPS WITH RAILING: A LITTLE
DAILY ACTIVITIY SCORE: 24

## 2024-02-21 ASSESSMENT — PAIN SCALES - GENERAL
PAINLEVEL_OUTOF10: 8
PAINLEVEL_OUTOF10: 0 - NO PAIN
PAINLEVEL_OUTOF10: 8
PAINLEVEL_OUTOF10: 9
PAINLEVEL_OUTOF10: 6
PAINLEVEL_OUTOF10: 10 - WORST POSSIBLE PAIN
PAINLEVEL_OUTOF10: 7
PAINLEVEL_OUTOF10: 7

## 2024-02-21 ASSESSMENT — PAIN - FUNCTIONAL ASSESSMENT
PAIN_FUNCTIONAL_ASSESSMENT: 0-10

## 2024-02-21 ASSESSMENT — PAIN DESCRIPTION - LOCATION
LOCATION: ABDOMEN

## 2024-02-21 ASSESSMENT — PAIN DESCRIPTION - ORIENTATION
ORIENTATION: UPPER
ORIENTATION: MID

## 2024-02-21 NOTE — SIGNIFICANT EVENT
Postoperative Check Note    Subjective  Dk Alas is a 43 y.o. male who is now POD0 s/p Laproscopic to open partial, reconsituted Cholecystectomy. Postoperatively, patient feels well, and denies fevers/chills, n/v, chest pain, shortness of breath. Feels his pain is well-controlled and appropriate for this time. Has no other concerns.    Objective  Vitals:  Visit Vitals  BP (!) 155/93   Pulse 91   Temp 36.1 °C (97 °F) (Temporal)   Resp 14       Physical Exam:  GEN: No acute distress. Alert, awake and conversive  HEENT: Atraumatic. Sclera anicteric. Moist mucous membranes.  RESP: Breathing non-labored, equal chest rise. On  3LNC.  CV: Regular rate, normotensive  GI: Abdomen soft, nondistended, appropriately tender for postoperative course. No strikethrough dressing. Drain is serosanguinous   : Voiding spontaneously.  MSK: No gross deformities. Moves all extremities spontaneously.  NEURO: Alert and oriented x3. No focal deficits.  PSYCH: Appropriate mood and affect.  SKIN: No rashes or lesions.    Most recent labs:            11.2     10.0>-----<309              35.2     134  98  12                  ----------------<160     4.3  27  0.88          Mg 1.82           Assessment  Dk Alas is a 43 y.o. male who is now POD0 s/p Laproscopic to open partial, reconsituted Cholecystectomy.  Patient is in stable condition, progressing appropriately through postoperative course. The plan is as follows:    - Will continue to optimize pain management, current pain regimen Oxycodone 5/10, Dilaudid for BT, tylenol, robaxin  - NPO   -  LR @ 75mL/hr   - PRN zofran  -lovenox  - ISS  - Miralax  - Zosyn  - Possible ERCP tomorrow    Will update day team in AM regarding exam. Will revisit patient on an as-needed basis.    Shai Avila MD  PGY-1 General Surgery  Acute Care Surgery [phone]

## 2024-02-21 NOTE — CARE PLAN
The patient's goals for the shift include      The clinical goals for the shift include patient will rate his pain less than 7/10 by the end of the shift

## 2024-02-21 NOTE — ANESTHESIA POSTPROCEDURE EVALUATION
Patient: Dk Alas    Procedure Summary       Date: 02/20/24 Room / Location: [facility] OR 11 / [facility] OR    Anesthesia Start: 1544 Anesthesia Stop:     Procedure: Laproscopic to open partial, reconsituted Cholecystectomy Diagnosis:       Acute cholecystitis      (Acute cholecystitis [K81.0])    Surgeons: Gómez Leroy DO Responsible Provider: Kamar Singh MD    Anesthesia Type: general ASA Status: 3            Anesthesia Type: general    Vitals Value Taken Time   /88 02/20/24 2119   Temp 36.7 °C (98.1 °F) 02/20/24 2116   Pulse 97 02/20/24 2126   Resp 18 02/20/24 2126   SpO2 96 % 02/20/24 2126   Vitals shown include unvalidated device data.    Anesthesia Post Evaluation    Patient location during evaluation: PACU  Patient participation: complete - patient participated  Level of consciousness: awake and alert  Pain management: adequate  Airway patency: patent  Cardiovascular status: acceptable and hypertensive  Respiratory status: acceptable  Hydration status: acceptable  Postoperative Nausea and Vomiting: moderate        No notable events documented.

## 2024-02-21 NOTE — BRIEF OP NOTE
Date: 2024  OR Location: [facility] OR    Name: Dk Alas YOB: 1980, Age: 43 y.o., MRN: [id number], Sex: male    Diagnosis  Pre-op Diagnosis     * Acute cholecystitis [K81.0] Post-op Diagnosis     * Acute cholecystitis [K81.0]     Procedures  Laproscopic to open partial, reconsituted Cholecystectomy  [id number] - [place] LAPS SURG CHOLECYSTECTOMY W/CHOLANGIOGRAPHY      Surgeons      * Gómez Leroy - Primary     * Dinorah Ricks    Resident/Fellow/Other Assistant:  Surgeon(s) and Role:     * Rocky Mae MD - Resident - Assisting     * Edgard Gonzalez MD - Resident - Assisting     * Dinorah Ricks MD    Procedure Summary  Anesthesia: General  ASA: III  Anesthesia Staff: Anesthesiologist: Nomi Shaw DO; Kamar Singh MD; Nia Mccarthy MD; Calvin Link MD  CRNA: AUNDREA Romeo-CRNA  Anesthesia Resident: Seema Bartlett MD; Alberto Purcell MD  Estimated Blood Loss: 100 mL  Intra-op Medications:   Administrations occurring from 1200 to 1525 on 24:   Medication Name Total Dose   acetaminophen (Tylenol) tablet 650 mg Cannot be calculated   enoxaparin (Lovenox) syringe 40 mg 40 mg   HYDROmorphone (Dilaudid) injection 0.2 mg Cannot be calculated   ondansetron (Zofran) injection 4 mg Cannot be calculated   oxyCODONE (Roxicodone) immediate release tablet 10 mg 10 mg   oxyCODONE (Roxicodone) immediate release tablet 5 mg Cannot be calculated   piperacillin-tazobactam-dextrose (Zosyn) IV 3.375 g Cannot be calculated   polyethylene glycol (Glycolax, Miralax) packet 17 g Cannot be calculated              Anesthesia Record               Intraprocedure I/O Totals          Intake    LR bolus 2000.00 mL    Total Intake 2000 mL       Output    Urine 700 mL    Est. Blood Loss 100 mL    NG/OG Tube Output 50 mL    Total Output 850 mL       Net    Net Volume 1150 mL          Specimen:   ID Type Source Tests Collected by Time   1 : GALLBLADDER Tissue GALLBLADDER CHOLECYSTECTOMY SURGICAL PATHOLOGY EXAM Dinorah DERAS  MD Millicent 2/20/2024 2025   2 : GALL STONES Tissue GALLBLADDER CHOLECYSTECTOMY SURGICAL PATHOLOGY EXAM Dinorah Ricks MD 2/20/2024 2025   A : ACUTE CHOLECYSTITIS CULTURE Tissue Gallbladder TISSUE/WOUND CULTURE/SMEAR Gómez Leroy, DO 2/20/2024 1723        Staff:   Circulator: Sera Lara RN; Heide Worley, RN  Relief Circulator: Martha Andrade RN; Anca Harvey RN; Tatum Rodrigues RN  Relief Scrub: Mariana Santana  Scrub Person: Yamilet Carballo RN          Findings: acute on chronic cholecystitis; purulent gallbladder; significant adhesions; innumerable gallstones encountered; unable to pass cholangiogram catheter through cystic duct d/t obstruction; subtotal reconstituting cholecystectomy performed and 19 Fr Ger drain inferior to remaining gallbladder and down to right paracolic gutter    Complications:  None; patient tolerated the procedure well.     Disposition: PACU - hemodynamically stable.  Condition: stable  Specimens Collected:   ID Type Source Tests Collected by Time   1 : GALLBLADDER Tissue GALLBLADDER CHOLECYSTECTOMY SURGICAL PATHOLOGY EXAM Dinorah Ricks MD 2/20/2024 2025   2 : GALL STONES Tissue GALLBLADDER CHOLECYSTECTOMY SURGICAL PATHOLOGY EXAM Dinorah Ricks MD 2/20/2024 2025   A : ACUTE CHOLECYSTITIS CULTURE Tissue Gallbladder TISSUE/WOUND CULTURE/SMEAR Gómez Leroy DO 2/20/2024 1723     Attending Attestation:     Gómez Leroy  Phone Number: [phone]

## 2024-02-21 NOTE — PROGRESS NOTES
Dk Alas is a 43 y.o. male on day 2 of admission presenting with Acute cholecystitis.    TODAY'S ASSESSMENT AND PLAN OF CARE:  Pt is a 42yo M with pMHhx of HTN, DLD, T2DM, posterior mediastinal mass s/p EUS+ERCP+biopsy on 11/30/2023, cholelithiasis and CBD stenosis s/p stent with interval resolution and removal of stent 1 month ago who presents with 4 days of RUQ and epigastric pain with elevated T.bili to 2.7, without fever or AMS, and positive Mcelroy's sign. On CT, he appears to have cholecystitis and non-dilated CBD. Patient received MRCP per GI recs showing further evidence of cholecystitis with no CBD stricture.     OR 2/20: laparoscopic to open partial, reconstituted cholecystectomy.        Plan:    Neuro: acute postop pain management   - scheduled PO tylenol  - Dilaudid PCA  - Robaxin, Lidoderm patch  Hx of Anxiety/Depression  - Resume home Adderall, Wellbutrin, PRN Buspar     Cards: Hx of HLD, HTN  - resume home Lipitor, hold home Lisinopril for now   - vital signs q4 hours    Resp:  - wean NC as tolerated  - Encourage IS, OOB     GI:  - MRCP obtained (2/19) no  evidence of acute cholecystitis. No evidence of CBD stricture, now s/p lap to open lori    - T bili downtrending to 2.1 this AM, will continue to trend daily  - Ok for clear liquid diet today   - Monitor drain output     :  - continue IVFs until taking in adequate PO   - replete lytes as indicated    Heme:  - no active issues   - Monitor H/H    Endo: Hx of DM  - continue blood glucose checks and SSI  - Hold home Metformin   - Resume home Synthroid     ID:  - Zosyn for 24 hours postop   - monitor CBC, trend daily     DVT prophylaxis:  - lovenox 40 mg    Dispo: continue RNF    Patient discussed with Attending Dr. Rad Stubbs APRN-[facility]  Acute Care Surgery   Pager [phone]      CHIEF COMPLAINT/ EVENTS LAST 24 HOURS / HPI:  Endorsing significant abdominal pain this AM. Patient stating current pain regimen is not helping with surgical pain.  "Denies n/v      MEDICAL HISTORY / ROS  Admission history reviewed. Pertinent changes as follows:   None      PHYSICAL EXAM:     Last Recorded Vitals  Blood pressure 154/89, pulse 85, temperature 37.3 °C (99.1 °F), temperature source Temporal, resp. rate 17, height 1.778 m (5' 10\"), weight 100 kg (220 lb 5.6 oz), SpO2 96 %.  Intake/Output last 3 Shifts:  I/O last 3 completed shifts:  In: 3150 (31.5 mL/kg) [I.V.:1100 (11 mL/kg); IV Piggyback:2050]  Out: 1290 (12.9 mL/kg) [Urine:1100 (0.3 mL/kg/hr); Emesis/NG output:50; Drains:40; Blood:100]  Weight: 99.9 kg     Physical Exam:   Constitutional- moderate discomfort  Cards- non cyanotic   Resp- nonlabored breathing on room air   Abdomen- soft, not distended; subcostal incision with original dressing in place, umbilical dressing in place. R MIKE with serous sang drainage   Extremities- ROSE   Skin- warm and dry   Neuro- alert and oriented x3   Psych- appropriate mood and behavior     LABS  Results for orders placed or performed during the hospital encounter of 02/19/24 (from the past 24 hour(s))   POCT GLUCOSE   Result Value Ref Range    POCT Glucose 118 (H) 74 - 99 mg/dL   POCT GLUCOSE   Result Value Ref Range    POCT Glucose 172 (H) 74 - 99 mg/dL   POCT GLUCOSE   Result Value Ref Range    POCT Glucose 175 (H) 74 - 99 mg/dL   POCT GLUCOSE   Result Value Ref Range    POCT Glucose 156 (H) 74 - 99 mg/dL   CBC   Result Value Ref Range    WBC 9.1 4.4 - 11.3 x10*3/uL    nRBC 0.0 0.0 - 0.0 /100 WBCs    RBC 4.11 (L) 4.50 - 5.90 x10*6/uL    Hemoglobin 11.4 (L) 13.5 - 17.5 g/dL    Hematocrit 34.7 (L) 41.0 - 52.0 %    MCV 84 80 - 100 fL    MCH 27.7 26.0 - 34.0 pg    MCHC 32.9 32.0 - 36.0 g/dL    RDW 12.8 11.5 - 14.5 %    Platelets 392 150 - 450 x10*3/uL   Magnesium   Result Value Ref Range    Magnesium 1.89 1.60 - 2.40 mg/dL   Comprehensive metabolic panel   Result Value Ref Range    Glucose 157 (H) 74 - 99 mg/dL    Sodium 134 (L) 136 - 145 mmol/L    Potassium 4.4 3.5 - 5.3 mmol/L    " Chloride 98 98 - 107 mmol/L    Bicarbonate 25 21 - 32 mmol/L    Anion Gap 15 10 - 20 mmol/L    Urea Nitrogen 11 6 - 23 mg/dL    Creatinine 0.75 0.50 - 1.30 mg/dL    eGFR >90 >60 mL/min/1.73m*2    Calcium 8.7 8.6 - 10.6 mg/dL    Albumin 3.4 3.4 - 5.0 g/dL    Alkaline Phosphatase 198 (H) 33 - 120 U/L    Total Protein 6.2 (L) 6.4 - 8.2 g/dL    AST 42 (H) 9 - 39 U/L    Bilirubin, Total 2.1 (H) 0.0 - 1.2 mg/dL    ALT 56 (H) 10 - 52 U/L   POCT GLUCOSE   Result Value Ref Range    POCT Glucose 148 (H) 74 - 99 mg/dL       IMAGING:   No new imaging    MEDS:  Scheduled medications  acetaminophen, 650 mg, oral, q6h  enoxaparin, 40 mg, subcutaneous, q24h  insulin lispro, 0-5 Units, subcutaneous, q4h  lidocaine, 1 patch, transdermal, Daily  methocarbamol, 500 mg, oral, q6h DARLING  piperacillin-tazobactam, 3.375 g, intravenous, q6h  polyethylene glycol, 17 g, oral, Daily      Continuous medications  HYDROmorphone,   lactated Ringer's, 75 mL/hr, Last Rate: 75 mL/hr (02/20/24 2382)      PRN medications  PRN medications: dextrose 10 % in water (D10W), dextrose, glucagon, naloxone, ondansetron

## 2024-02-21 NOTE — NURSING NOTE
RN administered I.V Ketoralac (Toradol), patient has an allergy to NSAIDs, the patient said he breaks out in hives.  RN informed the ACS team regarding this and asked if the patient is itching or has any break outs, and if it should occur Benadryl will be ordered and administered.  Patient declined having any itching and hives.

## 2024-02-22 LAB
ALBUMIN SERPL BCP-MCNC: 2.9 G/DL (ref 3.4–5)
ALP SERPL-CCNC: 155 U/L (ref 33–120)
ALT SERPL W P-5'-P-CCNC: 38 U/L (ref 10–52)
ANION GAP SERPL CALC-SCNC: 11 MMOL/L (ref 10–20)
AST SERPL W P-5'-P-CCNC: 19 U/L (ref 9–39)
BILIRUB SERPL-MCNC: 0.9 MG/DL (ref 0–1.2)
BUN SERPL-MCNC: 10 MG/DL (ref 6–23)
CALCIUM SERPL-MCNC: 8.5 MG/DL (ref 8.6–10.6)
CHLORIDE SERPL-SCNC: 97 MMOL/L (ref 98–107)
CO2 SERPL-SCNC: 31 MMOL/L (ref 21–32)
CREAT SERPL-MCNC: 0.72 MG/DL (ref 0.5–1.3)
EGFRCR SERPLBLD CKD-EPI 2021: >90 ML/MIN/1.73M*2
ERYTHROCYTE [DISTWIDTH] IN BLOOD BY AUTOMATED COUNT: 12.8 % (ref 11.5–14.5)
GLUCOSE BLD MANUAL STRIP-MCNC: 113 MG/DL (ref 74–99)
GLUCOSE BLD MANUAL STRIP-MCNC: 114 MG/DL (ref 74–99)
GLUCOSE BLD MANUAL STRIP-MCNC: 131 MG/DL (ref 74–99)
GLUCOSE BLD MANUAL STRIP-MCNC: 169 MG/DL (ref 74–99)
GLUCOSE BLD MANUAL STRIP-MCNC: 194 MG/DL (ref 74–99)
GLUCOSE BLD MANUAL STRIP-MCNC: 207 MG/DL (ref 74–99)
GLUCOSE SERPL-MCNC: 111 MG/DL (ref 74–99)
HCT VFR BLD AUTO: 31.8 % (ref 41–52)
HGB BLD-MCNC: 10.2 G/DL (ref 13.5–17.5)
MAGNESIUM SERPL-MCNC: 1.9 MG/DL (ref 1.6–2.4)
MCH RBC QN AUTO: 27.5 PG (ref 26–34)
MCHC RBC AUTO-ENTMCNC: 32.1 G/DL (ref 32–36)
MCV RBC AUTO: 86 FL (ref 80–100)
NRBC BLD-RTO: 0 /100 WBCS (ref 0–0)
PLATELET # BLD AUTO: 371 X10*3/UL (ref 150–450)
POTASSIUM SERPL-SCNC: 3.7 MMOL/L (ref 3.5–5.3)
PROT SERPL-MCNC: 6 G/DL (ref 6.4–8.2)
RBC # BLD AUTO: 3.71 X10*6/UL (ref 4.5–5.9)
SODIUM SERPL-SCNC: 135 MMOL/L (ref 136–145)
WBC # BLD AUTO: 6.7 X10*3/UL (ref 4.4–11.3)

## 2024-02-22 PROCEDURE — 83735 ASSAY OF MAGNESIUM: CPT

## 2024-02-22 PROCEDURE — 82947 ASSAY GLUCOSE BLOOD QUANT: CPT

## 2024-02-22 PROCEDURE — 2500000001 HC RX 250 WO HCPCS SELF ADMINISTERED DRUGS (ALT 637 FOR MEDICARE OP)

## 2024-02-22 PROCEDURE — 2500000002 HC RX 250 W HCPCS SELF ADMINISTERED DRUGS (ALT 637 FOR MEDICARE OP, ALT 636 FOR OP/ED): Performed by: NURSE PRACTITIONER

## 2024-02-22 PROCEDURE — 80053 COMPREHEN METABOLIC PANEL: CPT

## 2024-02-22 PROCEDURE — 96361 HYDRATE IV INFUSION ADD-ON: CPT

## 2024-02-22 PROCEDURE — 2500000004 HC RX 250 GENERAL PHARMACY W/ HCPCS (ALT 636 FOR OP/ED)

## 2024-02-22 PROCEDURE — 2500000005 HC RX 250 GENERAL PHARMACY W/O HCPCS: Performed by: NURSE PRACTITIONER

## 2024-02-22 PROCEDURE — 36415 COLL VENOUS BLD VENIPUNCTURE: CPT

## 2024-02-22 PROCEDURE — 96376 TX/PRO/DX INJ SAME DRUG ADON: CPT

## 2024-02-22 PROCEDURE — 2500000001 HC RX 250 WO HCPCS SELF ADMINISTERED DRUGS (ALT 637 FOR MEDICARE OP): Performed by: NURSE PRACTITIONER

## 2024-02-22 PROCEDURE — 2500000001 HC RX 250 WO HCPCS SELF ADMINISTERED DRUGS (ALT 637 FOR MEDICARE OP): Performed by: PODIATRIST

## 2024-02-22 PROCEDURE — 97161 PT EVAL LOW COMPLEX 20 MIN: CPT | Mod: GP | Performed by: PHYSICAL THERAPIST

## 2024-02-22 PROCEDURE — 85027 COMPLETE CBC AUTOMATED: CPT

## 2024-02-22 PROCEDURE — 1100000001 HC PRIVATE ROOM DAILY

## 2024-02-22 RX ORDER — POTASSIUM CHLORIDE 20 MEQ/1
20 TABLET, EXTENDED RELEASE ORAL ONCE
Status: COMPLETED | OUTPATIENT
Start: 2024-02-22 | End: 2024-02-22

## 2024-02-22 RX ORDER — OXYCODONE HYDROCHLORIDE 5 MG/1
10 TABLET ORAL EVERY 4 HOURS PRN
Status: DISCONTINUED | OUTPATIENT
Start: 2024-02-22 | End: 2024-02-23 | Stop reason: HOSPADM

## 2024-02-22 RX ORDER — OXYCODONE HYDROCHLORIDE 5 MG/1
5 TABLET ORAL EVERY 4 HOURS PRN
Status: DISCONTINUED | OUTPATIENT
Start: 2024-02-22 | End: 2024-02-23 | Stop reason: HOSPADM

## 2024-02-22 RX ORDER — HYDROMORPHONE HYDROCHLORIDE 1 MG/ML
0.2 INJECTION, SOLUTION INTRAMUSCULAR; INTRAVENOUS; SUBCUTANEOUS EVERY 2 HOUR PRN
Status: DISCONTINUED | OUTPATIENT
Start: 2024-02-22 | End: 2024-02-23 | Stop reason: HOSPADM

## 2024-02-22 RX ADMIN — ATORVASTATIN CALCIUM 20 MG: 20 TABLET, FILM COATED ORAL at 08:53

## 2024-02-22 RX ADMIN — POLYETHYLENE GLYCOL 3350 17 G: 17 POWDER, FOR SOLUTION ORAL at 08:52

## 2024-02-22 RX ADMIN — ACETAMINOPHEN 650 MG: 325 TABLET ORAL at 18:38

## 2024-02-22 RX ADMIN — INSULIN LISPRO 1 UNITS: 100 INJECTION, SOLUTION INTRAVENOUS; SUBCUTANEOUS at 11:00

## 2024-02-22 RX ADMIN — BUPROPION HYDROCHLORIDE 300 MG: 150 TABLET, FILM COATED, EXTENDED RELEASE ORAL at 08:53

## 2024-02-22 RX ADMIN — LEVOTHYROXINE SODIUM 200 MCG: 100 TABLET ORAL at 05:35

## 2024-02-22 RX ADMIN — ENOXAPARIN SODIUM 40 MG: 100 INJECTION SUBCUTANEOUS at 13:11

## 2024-02-22 RX ADMIN — OXYCODONE HYDROCHLORIDE 10 MG: 5 TABLET ORAL at 13:11

## 2024-02-22 RX ADMIN — ACETAMINOPHEN 650 MG: 325 TABLET ORAL at 05:36

## 2024-02-22 RX ADMIN — METHOCARBAMOL 500 MG: 500 TABLET ORAL at 11:00

## 2024-02-22 RX ADMIN — METHOCARBAMOL 500 MG: 500 TABLET ORAL at 00:04

## 2024-02-22 RX ADMIN — METHOCARBAMOL 500 MG: 500 TABLET ORAL at 18:38

## 2024-02-22 RX ADMIN — METHOCARBAMOL 500 MG: 500 TABLET ORAL at 23:09

## 2024-02-22 RX ADMIN — ACETAMINOPHEN 650 MG: 325 TABLET ORAL at 13:11

## 2024-02-22 RX ADMIN — ACETAMINOPHEN 650 MG: 325 TABLET ORAL at 23:09

## 2024-02-22 RX ADMIN — OXYCODONE HYDROCHLORIDE 10 MG: 5 TABLET ORAL at 23:09

## 2024-02-22 RX ADMIN — LIDOCAINE 1 PATCH: 4 PATCH TOPICAL at 08:53

## 2024-02-22 RX ADMIN — DEXTROAMPHETAMINE SACCHARATE, AMPHETAMINE ASPARTATE, DEXTROAMPHETAMINE SULFATE, AND AMPHETAMINE SULFATE 15 MG: 2.5; 2.5; 2.5; 2.5 TABLET ORAL at 08:52

## 2024-02-22 RX ADMIN — OXYCODONE HYDROCHLORIDE 10 MG: 5 TABLET ORAL at 18:38

## 2024-02-22 RX ADMIN — INSULIN LISPRO 1 UNITS: 100 INJECTION, SOLUTION INTRAVENOUS; SUBCUTANEOUS at 17:07

## 2024-02-22 RX ADMIN — POTASSIUM CHLORIDE 20 MEQ: 1500 TABLET, EXTENDED RELEASE ORAL at 14:24

## 2024-02-22 RX ADMIN — METHOCARBAMOL 500 MG: 500 TABLET ORAL at 05:35

## 2024-02-22 ASSESSMENT — COGNITIVE AND FUNCTIONAL STATUS - GENERAL
DAILY ACTIVITIY SCORE: 23
STANDING UP FROM CHAIR USING ARMS: A LITTLE
CLIMB 3 TO 5 STEPS WITH RAILING: A LITTLE
WALKING IN HOSPITAL ROOM: A LITTLE
CLIMB 3 TO 5 STEPS WITH RAILING: A LITTLE
WALKING IN HOSPITAL ROOM: A LITTLE
MOBILITY SCORE: 19
TOILETING: A LITTLE
TURNING FROM BACK TO SIDE WHILE IN FLAT BAD: A LITTLE
MOVING TO AND FROM BED TO CHAIR: A LITTLE
STANDING UP FROM CHAIR USING ARMS: A LITTLE
MOVING TO AND FROM BED TO CHAIR: A LITTLE
TURNING FROM BACK TO SIDE WHILE IN FLAT BAD: A LITTLE
MOBILITY SCORE: 19

## 2024-02-22 ASSESSMENT — PAIN DESCRIPTION - LOCATION: LOCATION: ABDOMEN

## 2024-02-22 ASSESSMENT — PAIN SCALES - GENERAL
PAINLEVEL_OUTOF10: 6
PAINLEVEL_OUTOF10: 3
PAINLEVEL_OUTOF10: 6
PAINLEVEL_OUTOF10: 7
PAINLEVEL_OUTOF10: 5 - MODERATE PAIN
PAINLEVEL_OUTOF10: 3

## 2024-02-22 ASSESSMENT — PAIN - FUNCTIONAL ASSESSMENT
PAIN_FUNCTIONAL_ASSESSMENT: 0-10

## 2024-02-22 ASSESSMENT — ACTIVITIES OF DAILY LIVING (ADL): ADL_ASSISTANCE: INDEPENDENT

## 2024-02-22 NOTE — ED PROVIDER NOTES
HPI   Chief Complaint   Patient presents with    Abdominal Pain     [place]       43-year-old male past medical history of hypertension diabetes hyperlipidemia hypothyroidism as well as a previous chest wall mass that was excised by thoracic surgery here who presents today for cholecystitis.  Patient was a transfer from an outside hospital, they felt that he would be more appropriately taken care of here given that he follows with thoracic surgery and had a recent surgery here.  Patient endorsed that he was having 2 days of right upper quadrant pain nausea vomiting and inability to tolerate p.o. he was scanned at the outside hospital and this demonstrated that he had cholecystitis.  He did have a white count there was treated with Zosyn.  Patient states that his pain is improved since then, is overall feeling better.  Denies any abdominal distention, inability to urinate, is still passing gas.      History provided by:  Patient, medical records and EMS personnel   used: No                        No data recorded                   Patient History   Past Medical History:   Diagnosis Date    ADHD (attention deficit hyperactivity disorder)     Anxiety     Cholelithiasis     Contusion of right shoulder     Follows with PT Jonnie Rader    Depression     Diabetes mellitus (CMS/HCC)     Manged by PCP    H/O electrocardiogram     NORMAL ECG in 2021    History of ERCP 01/09/2024    Hyperlipidemia     Hypertension     Hypothyroidism     Mediastinal mass 01/04/2024    Gen: Tyrone Benitez    Motion sickness     Obstructive jaundice     Per ERCP on 1/9/24    Pulmonary nodule     solid non-calcified pulmonary nodule measuring greater than 8 mm.     Past Surgical History:   Procedure Laterality Date    BILE DUCT STENT PLACEMENT      Stent placed 11/2023 for narrowing of CBD    CT ANGIO CHEST FOR PULMONARY EMBOLISM      CT scan on 11/28/23    CT ANGIO CORONARY ART WITH HEARTFLOW IF SCORE >30%  10/26/2021    CT  ANGIO CORONARY ART WITH HEARTFLOW IF SCORE >30% 10/26/2021     Family History   Problem Relation Name Age of Onset    Diabetes Mother      Hypertension Mother      Heart attack Mother      Heart attack Maternal Grandmother       Social History     Tobacco Use    Smoking status: Former     Types: Cigarettes     Quit date:      Years since quittin.1    Smokeless tobacco: Never   Vaping Use    Vaping Use: Never used   Substance Use Topics    Alcohol use: Never    Drug use: Never       Physical Exam   ED Triage Vitals   Temp Heart Rate Resp BP   24 0640 24 0640 24 0640 24 0640   36.7 °C (98.1 °F) 89 18 146/90      SpO2 Temp Source Heart Rate Source Patient Position   24 0640 24 0124 24 0124 24 0124   98 % Temporal Monitor Lying      BP Location FiO2 (%)     24 0124 --     Left arm        Physical Exam  Vitals and nursing note reviewed.   Constitutional:       General: He is not in acute distress.     Appearance: He is well-developed and normal weight. He is ill-appearing. He is not toxic-appearing or diaphoretic.   HENT:      Head: Normocephalic and atraumatic.   Eyes:      Extraocular Movements: Extraocular movements intact.      Pupils: Pupils are equal, round, and reactive to light.   Cardiovascular:      Rate and Rhythm: Normal rate and regular rhythm.      Heart sounds: Normal heart sounds. No murmur heard.     No gallop.   Pulmonary:      Effort: Pulmonary effort is normal. No respiratory distress.      Breath sounds: Normal breath sounds. No wheezing, rhonchi or rales.   Abdominal:      General: Abdomen is flat. There is no distension. There are no signs of injury.      Palpations: Abdomen is soft.      Tenderness: There is abdominal tenderness in the right upper quadrant. There is no guarding. Negative signs include Mcelroy's sign, Rovsing's sign and McBurney's sign.      Hernia: No hernia is present.   Skin:     General: Skin is warm and dry.       Capillary Refill: Capillary refill takes less than 2 seconds.      Coloration: Skin is not jaundiced or pale.      Findings: No erythema or rash.   Neurological:      General: No focal deficit present.      Mental Status: He is alert and oriented to person, place, and time.         ED Course & MDM   ED Course as of 02/22/24 0816   Mon Feb 19, 2024   0918 ED Senior resident attestation: 43-year-old male with history of obstructive jaundice s/p ERCP with stent placement 11/28/2023, posterior mediastinal mass resection and RLL wedge resection 1/30/2024 who presents as a transfer from outside hospital for concern for acute cholecystitis.  Patient reports recurrent right upper quadrant and epigastric pain similar to the pain that initially led to the ERCP 2 months prior.  He reports chills at home.  CT imaging at outside hospital revealed a dilated gallbladder with wall thickening and pericholecystic fluid concerning for acute cholecystitis for which patient was transferred.  On exam, patient continues to endorse significant right upper quadrant pain with positive Mcelroy sign, hemodynamically stable and otherwise nontoxic-appearing.  Zosyn was re-dosed and patient's pain was controlled.  Acute care surgery was immediately consulted. [MARINO]      ED Course User Index  [MARINO] Nura Mchugh, DO         Diagnoses as of 02/22/24 0816   Acute cholecystitis       Medical Decision Making  43-year-old male past medical history of hypothyroid hypertension hyperlipidemia diabetes who presents today for cholecystitis.  Given that the patient was seen at an outside hospital which their records do not transfer into our system, we will get repeat labs including preoperative labs and an EKG.  I did put in for a dose of pain medication for him.  I did not put in for repeat antibiotics as I did not have time to review his records in depth prior to signout.  Patient will be signed out pending labs, ACS consult, and likely admission for surgical  intervention.        Procedure  Procedures     Wang Evans MD  Resident  02/22/24 0488

## 2024-02-22 NOTE — PROGRESS NOTES
Physical Therapy    Physical Therapy Evaluation    Patient Name: Dk Alas  MRN: [id number]  Today's Date: 2/22/2024   Time Calculation  Start Time: 0850  Stop Time: 0920  Time Calculation (min): 30 min    Assessment/Plan   PT Assessment  PT Assessment Results: Decreased endurance, Decreased mobility, Pain  Rehab Prognosis: Good  Evaluation/Treatment Tolerance: Patient tolerated treatment well  End of Session Communication: Bedside nurse  Assessment Comment: Pt. is a 44 y/o M s/p laparoscopic to open partial, reconstituted cholecystectomy. Pt. presents with pain, decreased endurance, and difficulty with functional mobility compared to baseline. Pt. would benefit from skilled PT while IP to address these deficits.  End of Session Patient Position: Up in chair, Alarm off, not on at start of session  IP OR SWING BED PT PLAN  Inpatient or Swing Bed: Inpatient  PT Plan  Treatment/Interventions: Bed mobility, Transfer training, Gait training, Strengthening, Endurance training, Therapeutic exercise, Therapeutic activity, Home exercise program  PT Plan: Skilled PT  PT Frequency: 3 times per week  PT Discharge Recommendations: No PT needed after discharge  PT Recommended Transfer Status: Assist x1  PT - OK to Discharge: Yes (PT evaluation complete and rehab recommendations made.)    Subjective         General Visit Information:  General  Reason for Referral: OR 2/20: laparoscopic to open partial, reconstituted cholecystectomy.  Past Medical History Relevant to Rehab: pMHhx of HTN, DLD, T2DM, posterior mediastinal mass s/p EUS+ERCP+biopsy on 11/30/2023, cholelithiasis and CBD stenosis s/p stent with interval resolution and removal of stent 1 month ago  Family/Caregiver Present: No  Prior to Session Communication: Bedside nurse  Patient Position Received: Bed, 3 rail up, Alarm off, not on at start of session  General Comment: Pt. supine in bed, pleasant and agreeable to participate in session. 2L NC, 86% on trial of RA.    Home  "Living:  Home Living  Type of Home: House  Lives With:  (brother- able to assist PRN)  Home Adaptive Equipment: None  Home Layout: One level  Home Access: Stairs to enter with rails  Entrance Stairs-Number of Steps: 2  Bathroom Shower/Tub: Tub/shower unit  Bathroom Equipment: Grab bars in shower    Prior Level of Function:  Prior Function Per Pt/Caregiver Report  Level of [place]: Independent with ADLs and functional transfers, Independent with homemaking with ambulation  ADL Assistance: Independent  Homemaking Assistance: Independent  Ambulatory Assistance: Independent (no AD)  Vocational: Full time employment ([employer])  Leisure: enjoys disc golf  Prior Function Comments: +driving no hx. of falls    Precautions:  Precautions  Post-Surgical Precautions: Abdominal surgery precautions    Vital Signs:  Vital Signs  Heart Rate:  (76-83 throughout ambulation)  Heart Rate Source: Monitor  SpO2:  (88% on RA, recovered to 93-97% on 2L NC)  Objective     Pain:  Pain Assessment  Pain Assessment: 0-10  Pain Score: 6  Pain Type: Surgical pain  Pain Location: Abdomen    Cognition:  Cognition  Overall Cognitive Status: Within Functional Limits  Attention: Within Functional Limits    General Assessments:      Activity Tolerance  Endurance:  (desat on RA, VSS with 2 L NC)  Sensation  Light Touch: No apparent deficits  Strength  Strength Comments: grossly > 4/5 throughout BLE\"s based on observed functional mobility        Postural Control  Postural Control:  (slight trunk flexion in standing as position of comfort s/p abdominal surgery)  Static Sitting Balance  Static Sitting-Balance Support: Feet supported  Static Sitting-Level of Assistance: Independent  Dynamic Sitting Balance  Dynamic Sitting-Balance Support: Feet supported  Dynamic Sitting-Balance:  (independent)  Static Standing Balance  Static Standing-Balance Support: No upper extremity supported  Static Standing-Level of Assistance: Close supervision  Dynamic Standing " Balance  Dynamic Standing-Balance Support: No upper extremity supported  Dynamic Standing-Balance:  (CGA for hallway ambulation)    Functional Assessments:     Bed Mobility  Bed Mobility: Yes  Bed Mobility 1  Bed Mobility 1: Supine to sitting  Level of Assistance 1: Close supervision  Bed Mobility Comments 1: cues for log roll secondary to abdominal precautions  Transfers  Transfer: Yes  Transfer 1  Technique 1: Sit to stand, Stand to sit  Transfer Device 1:  (no AD)  Transfer Level of Assistance 1: Contact guard  Ambulation/Gait Training  Ambulation/Gait Training Performed: Yes  Ambulation/Gait Training 1  Surface 1: Level tile  Device 1: No device  Assistance 1: Contact guard  Quality of Gait 1:  (decreased rocio, no LOB noted. Required 1 seated rest prior to ambulating back to room secondary to abdominal pain. VSS throughout on 2L NC.)  Comments/Distance (ft) 1: 85 ft x 2  Stairs  Stairs: No       Extremity/Trunk Assessments:        RLE   RLE : Within Functional Limits  LLE   LLE : Within Functional Limits    Outcome Measures:  [facility] Basic Mobility  Turning from your back to your side while in a flat bed without using bedrails: None  Moving from lying on your back to sitting on the side of a flat bed without using bedrails: A little  Moving to and from bed to chair (including a wheelchair): A little  Standing up from a chair using your arms (e.g. wheelchair or bedside chair): A little  To walk in hospital room: A little  Climbing 3-5 steps with railing: A little  Basic Mobility - Total Score: 19                            Goals:  Encounter Problems       Encounter Problems (Active)       Mobility       STG - Patient will ambulate > 200 ft. with no LOB and VSS       Start:  02/22/24    Expected End:  02/29/24            STG - Patient will ascend and descend 2 steps independently to safely enter/exit the home set up       Start:  02/22/24    Expected End:  02/29/24               Transfers       STG - Patient will  perform bed mobility via log roll independently        Start:  02/22/24    Expected End:  02/29/24                 Education Documentation  Precautions, taught by Erendira Paniagua, PT at 2/22/2024 10:03 AM.  Learner: Patient  Readiness: Acceptance  Method: Explanation  Response: Verbalizes Understanding    Body Mechanics, taught by Erendira Paniagua, PT at 2/22/2024 10:03 AM.  Learner: Patient  Readiness: Acceptance  Method: Explanation  Response: Verbalizes Understanding    Home Exercise Program, taught by Erendira Paniagua PT at 2/22/2024 10:03 AM.  Learner: Patient  Readiness: Acceptance  Method: Explanation  Response: Verbalizes Understanding    Mobility Training, taught by Erendira Paniagua PT at 2/22/2024 10:03 AM.  Learner: Patient  Readiness: Acceptance  Method: Explanation  Response: Verbalizes Understanding

## 2024-02-22 NOTE — PROGRESS NOTES
Music Therapy Note    Dk Alas     Therapy Session  Referral Type: Pain rounds  Visit Type: New visit  Session Start Time: 0130  Session End Time: 0140  Intervention Delivery: In-person  Conflict of Service: None               Treatment/Interventions  Areas of Focus: Pain management  Music Therapy Interventions: Assessment    Post-assessment  Total Session Time (min): 10 minutes    Narrative  Assessment Detail: Provided patient with asessment and education session on utlizing non-pharmocological modalities for reducing stress, pain, anxiety and providing means of coping. Faciliated discussion and provided contact for follow up session with MTx.    Education Documentation  Resources, taught by Declan Chung at 2/22/2024 11:43 AM.  Learner: Patient  Readiness: Acceptance  Method: Explanation, Teach-back  Response: Verbalizes Understanding    Coping Strategies, taught by Declan Chung at 2/22/2024 11:43 AM.  Learner: Patient  Readiness: Acceptance  Method: Explanation, Teach-back  Response: Verbalizes Understanding    Relaxation, taught by Declan Chung at 2/22/2024 11:43 AM.  Learner: Patient  Readiness: Acceptance  Method: Explanation, Teach-back  Response: Verbalizes Understanding    Regiments, taught by Declan Chung at 2/22/2024 11:43 AM.  Learner: Patient  Readiness: Acceptance  Method: Explanation, Teach-back  Response: Verbalizes Understanding    Integrative Health, taught by Declan Chung at 2/22/2024 11:43 AM.  Learner: Patient  Readiness: Acceptance  Method: Explanation, Teach-back  Response: Verbalizes Understanding    Focal Points, taught by Declan Chung at 2/22/2024 11:43 AM.  Learner: Patient  Readiness: Acceptance  Method: Explanation, Teach-back  Response: Verbalizes Understanding    Pain Management, taught by Declan Chung at 2/22/2024 11:43 AM.  Learner: Patient  Readiness: Acceptance  Method: Explanation, Teach-back  Response: Verbalizes Understanding

## 2024-02-22 NOTE — OP NOTE
Laproscopic to open partial, reconsituted Cholecystectomy Operative Note     Date: 2024  OR Location: [facility] OR    Name: Dk Alas YOB: 1980, Age: 43 y.o., MRN: [id number], Sex: male    Diagnosis  Pre-op Diagnosis     * Acute cholecystitis [K81.0] Post-op Diagnosis     * Acute cholecystitis [K81.0]     Procedures  Laparoscopic to open partial, reconsituted Cholecystectomy        Surgeons      * Gómez Leroy - Primary     * Dinorah Ricks    Resident/Fellow/Other Assistant:  Surgeon(s) and Role:     * Rocky Mae MD - Resident - Assisting     * Edgard Gonzalez MD - Resident - Assisting     * Dinorah Ricks MD    Procedure Summary  Anesthesia: General  ASA: III  Anesthesia Staff: Anesthesiologist: Nomi Shaw DO; Kamar Singh MD; Nia Mccarthy MD; Calvin Link MD  CRNA: AUNDREA Romeo-CRNA  Anesthesia Resident: Seema Bartlett MD; Alberto Purcell MD  Estimated Blood Loss: 100mL  Intra-op Medications:   Administrations occurring from 1200 to 1525 on 24:   Medication Name Total Dose   acetaminophen (Tylenol) tablet 650 mg Cannot be calculated   enoxaparin (Lovenox) syringe 40 mg 40 mg   ondansetron (Zofran) injection 4 mg Cannot be calculated   polyethylene glycol (Glycolax, Miralax) packet 17 g Cannot be calculated   HYDROmorphone (Dilaudid) injection 0.2 mg Cannot be calculated   oxyCODONE (Roxicodone) immediate release tablet 10 mg 10 mg   oxyCODONE (Roxicodone) immediate release tablet 5 mg Cannot be calculated   piperacillin-tazobactam-dextrose (Zosyn) IV 3.375 g Cannot be calculated              Anesthesia Record               Intraprocedure I/O Totals          Intake    LR bolus 2000.00 mL    Total Intake 2000 mL       Output    Urine 700 mL    Est. Blood Loss 100 mL    NG/OG Tube Output 50 mL    Total Output 850 mL       Net    Net Volume 1150 mL          Specimen:   ID Type Source Tests Collected by Time   1 : GALLBLADDER AND GALLSTONES Tissue GALLBLADDER  CHOLECYSTECTOMY SURGICAL PATHOLOGY EXAM Dinorah Ricks MD 2/20/2024 2025   2 : GALL STONES Tissue GALLBLADDER CHOLECYSTECTOMY SURGICAL PATHOLOGY EXAM Dinorah Ricks MD 2/20/2024 2025   A : ACUTE CHOLECYSTITIS CULTURE Tissue Gallbladder TISSUE/WOUND CULTURE/SMEAR Gómez Leroy, DO 2/20/2024 1723        Staff:   Circulator: Sera Lara RN; Heide Worley RN  Relief Circulator: Martha Andrade, ALVIN; Anca Harvey, RN; Tatum Rodrigues RN  Relief Scrub: Mariana Santana  Scrub Person: Yamilet Carballo RN         Drains and/or Catheters:   Closed/Suction Drain RUQ Bulb 19 Fr. (Active)   Site Description Healing 02/21/24 2054   Dressing Status Clean;Dry 02/21/24 2054   Drainage Appearance Serosanguineous 02/20/24 2300   Status To bulb suction 02/21/24 0946   Output (mL) 35 mL 02/22/24 1425       [REMOVED] Urethral Catheter Non-latex 14 Fr. (Removed)       Tourniquet Times:         Implants:     Findings: Gallbladder unable to be viewed laparoscopically, and colon could not easily be mobilized off. Conversion to open.     Indications: Dk Alas is an 43 y.o. male who is having surgery for Acute cholecystitis [K81.0].     The patient was seen in the preoperative area. The risks, benefits, complications, treatment options, non-operative alternatives, expected recovery and outcomes were discussed with the patient. The possibilities of reaction to medication, pulmonary aspiration, injury to surrounding structures, bleeding, recurrent infection, the need for additional procedures, failure to diagnose a condition, and creating a complication requiring transfusion or operation were discussed with the patient. The patient concurred with the proposed plan, giving informed consent.  The site of surgery was properly noted/marked if necessary per policy. The patient has been actively warmed in preoperative area. Preoperative antibiotics have been ordered and given within 0.25 hours of incision. Venous thrombosis prophylaxis have  "been ordered including bilateral sequential compression devices    Procedure Details: Appropriate consent was obtained.  Patient was taken to the operating room and a \"timeout\" was performed and verified as correct.  This verified the patient's name, MRN, date of birth, procedure, position, allergies, antibiotics, need for special equipment, amongst other information.  After this being accomplished anesthesia induced general anesthesia and intubated the patient without complication.  Patient was prepped and draped in standard sterile fashion.  Before beginning the procedure another \"timeout\" was done and verified as correct.    We began the procedure by placing an umbilical port in the infraumbilical location by Mcpherson cutdown in the standard fashion.  There is no bowel injury made upon placement.  We inflated the abdomen and performed a diagnostic laparoscopy.  We found a grade 4 view of the gallbladder and it was completely covered by a fixed transverse colon.  We placed a subxiphoid port under direct vision and attempted to gently mobilize the colon off of the gallbladder.  This was not able to be accomplished under multiple passes with judicious pressure.  The decision was then made to convert the patient to an open procedure.    A subcostal oblique incision was made and tunneled into the abdominal cavity using sharp and electrocautery.  The Bookwalter retractor was placed and the colon was gently finger fractured off of the gallbladder without injury to the colon.  The gallbladder was tensely distended and obviously gangrenous.  We attempted to decompress the gallbladder with a needle decompression however the gallbladder was filled mostly with stones and this was not successful.  We began a dome down dissection of the gallbladder off the liver and off of its investing peritoneum in the standard fashion.  Because of the gangrenous nature of the gallbladder some holes in the gallbladder were inevitably made with " "some small leakage of gallstones.  These were removed from the abdominal cavity when encountered.  We continued dissecting down towards the infundibulum of the gallbladder.  The gallbladder continued to be very gangrenous and was tearing even with the most gentle of pressure.  As there was a large amount of inflammation very close to the infundibulum of the gallbladder and the cystic versus CBD structures were not able to be visualized we opened the gallbladder and manually removed all the stones.  We were able to see the around three quarters of an inch of infundibulum remaining of the gallbladder before getting into the cystic duct.  We placed a DeBakey forcep and removed all the stones remaining in the gallbladder and in the cystic duct.  Then attempted to place a pediatric feeding tube into the cystic duct for cholangiography, however we could not pass the tube despite removing all of the stones.  As further dissection was not felt to be judicious we got the TA blue load stapler to close the stump and performed a subtotal reconstituting cholecystectomy and sent the gallbladder and stones off as a permanent specimen entitled \"gallbladder.\"  We left around three quarters of an inch of gallbladder infundibulum.  At this point the rest of the procedure was turned over to my partner Dr. Dinorah Ricks.  She washed out the right upper quadrant and placed a drain in the gallbladder bed.  Hemostasis was achieved.  The counts were correct.  The fascia and then skin were closed.  The final counts were correct.  The patient tolerated procedure well without complication.  This was a class IV case.  There were no complications.  I was present for all key portions of the procedure.  Complications:  None; patient tolerated the procedure well.    Disposition: PACU - hemodynamically stable.  Condition: stable         Additional Details: none    Attending Attestation: I was present and scrubbed for the key portions of the " procedure.    Gómez Leroy  Phone Number: [phone]

## 2024-02-22 NOTE — PROGRESS NOTES
[facility]  ACUTE CARE SURGERY - PROGRESS NOTE    Patient Name: Dk Alas  MRN: [id number]  Admit Date: 219  : 1980  AGE: 43 y.o.   GENDER: male  ==============================================================================  TODAY'S ASSESSMENT AND PLAN OF CARE:  44yo M with pMHhx of HTN, DLD, T2DM, hypothyroidism, ADHD, anxiety, depression, posterior mediastinal mass s/p EUS+ERCP+biopsy on 2023, cholelithiasis and CBD stenosis s/p stent with interval resolution and removal of stent 1 month ago who presents with 4 days of RUQ and epigastric pain with elevated T.bili to 2.7, without fever or AMS, and positive Mcelroy's sign. On CT, he appears to have cholecystitis and non-dilated CBD. Patient received MRCP per GI recs showing further evidence of cholecystitis with no CBD stricture. Advancing PO diet as tolerated.      : s/p laparoscopic to open partial, reconstituted cholecystectomy     Plan:  Neuro: acute postop pain management   - scheduled PO tylenol  - Today, discontinued Dilaudid PCA. Started oxycodone 5 and 10mg for mod and severe pain respectively; 0.2 mg dilaudid breakthrough pain   - Robaxin, Lidoderm patch  Hx of Anxiety/Depression/ADHD  - Continue with Adderall, Wellbutrin, PRN Buspar      Cards: Hx of HLD, HTN  - Continue with home Lipitor; holding home Lisinopril for now   - Vital signs q4 hours     Resp:  - Wean NC as tolerated. Currently on 2L NC  - Encourage Incentive spirometry 10x per hour  - Encourage OOB      GI:  - MRCP obtained () no evidence of acute cholecystitis. No evidence of CBD stricture, now s/p lap to open lori   - T bili downtrending to 2.1 this AM, will continue to trend daily  - Started regular diet today  - Monitor drain output      :  - IVFs discontinued today in setting of starting PO intake   - replete electrolytes as indicated     Heme:  - no active issues   - Monitor H/H     Endo: Hx of T2DM; hypothyroidism  -  continue blood glucose checks and SSI  - Holding home Metformin   - Continue home Synthroid      ID:  - Zosyn for 24 hours postop   - monitor CBC, trend daily      DVT prophylaxis:  - Lovenox 40 mg    MSK:  - OOB   - PT: rec no PT needed after discharge     Dispo: continue RNF     Patient seen and discussed with Attending Dr. Busby.      Plan preliminary until cosigned by attending physician.     Juliann Hutchison MD   PGY1, Family Medicine   Acute Care Surgery Service  Pager [phone]   ==============================================================================  CHIEF COMPLAINT / EVENTS LAST 24HRS / HPI:  No overnight events. He is doing well. He is passing flatus; no BM yet. He tolerated a regular diet for breakfast. Denies nausea, vomiting.     MEDICAL HISTORY / ROS:   Admission history and ROS reviewed.     PHYSICAL EXAM:    Vitals past 24h  Temp:  [36 °C (96.8 °F)-36.9 °C (98.4 °F)] 36.6 °C (97.9 °F)  Heart Rate:  [70-79] 78  Resp:  [18] 18  BP: (116-133)/(72-78) 130/77     I/O past 24h  Intake/Output Summary (Last 24 hours) at 2/22/2024 1200  Last data filed at 2/22/2024 1100  Gross per 24 hour   Intake 2128.75 ml   Output 1140 ml   Net 988.75 ml        Physical Exam  Constitutional- resting comfortably in bed; no acute distress   Cards- non cyanotic   Resp- nonlabored breathing on 2L NC  Abdomen- soft, not distended; R MIKE with 20 mL serous sang drainage; surgical dressing removed; incisions well co-apted with staples intact; no erythema, no purulence, no calor  Extremities- ROSE   Skin- warm and dry   Neuro- alert and oriented x3   Psych- appropriate mood and behavior   LABS:  Results for orders placed or performed during the hospital encounter of 02/19/24 (from the past 24 hour(s))   POCT GLUCOSE   Result Value Ref Range    POCT Glucose 205 (H) 74 - 99 mg/dL   POCT GLUCOSE   Result Value Ref Range    POCT Glucose 162 (H) 74 - 99 mg/dL   POCT GLUCOSE   Result Value Ref Range    POCT Glucose 150 (H) 74 - 99  mg/dL   POCT GLUCOSE   Result Value Ref Range    POCT Glucose 113 (H) 74 - 99 mg/dL   CBC   Result Value Ref Range    WBC 6.7 4.4 - 11.3 x10*3/uL    nRBC 0.0 0.0 - 0.0 /100 WBCs    RBC 3.71 (L) 4.50 - 5.90 x10*6/uL    Hemoglobin 10.2 (L) 13.5 - 17.5 g/dL    Hematocrit 31.8 (L) 41.0 - 52.0 %    MCV 86 80 - 100 fL    MCH 27.5 26.0 - 34.0 pg    MCHC 32.1 32.0 - 36.0 g/dL    RDW 12.8 11.5 - 14.5 %    Platelets 371 150 - 450 x10*3/uL   Magnesium   Result Value Ref Range    Magnesium 1.90 1.60 - 2.40 mg/dL   Comprehensive metabolic panel   Result Value Ref Range    Glucose 111 (H) 74 - 99 mg/dL    Sodium 135 (L) 136 - 145 mmol/L    Potassium 3.7 3.5 - 5.3 mmol/L    Chloride 97 (L) 98 - 107 mmol/L    Bicarbonate 31 21 - 32 mmol/L    Anion Gap 11 10 - 20 mmol/L    Urea Nitrogen 10 6 - 23 mg/dL    Creatinine 0.72 0.50 - 1.30 mg/dL    eGFR >90 >60 mL/min/1.73m*2    Calcium 8.5 (L) 8.6 - 10.6 mg/dL    Albumin 2.9 (L) 3.4 - 5.0 g/dL    Alkaline Phosphatase 155 (H) 33 - 120 U/L    Total Protein 6.0 (L) 6.4 - 8.2 g/dL    AST 19 9 - 39 U/L    Bilirubin, Total 0.9 0.0 - 1.2 mg/dL    ALT 38 10 - 52 U/L   POCT GLUCOSE   Result Value Ref Range    POCT Glucose 194 (H) 74 - 99 mg/dL   POCT GLUCOSE   Result Value Ref Range    POCT Glucose 207 (H) 74 - 99 mg/dL     MEDICATIONS:  Current Facility-Administered Medications   Medication Dose Route Frequency Provider Last Rate Last Admin    acetaminophen (Tylenol) tablet 650 mg  650 mg oral q6h Rocky Mae MD   650 mg at 02/22/24 0536    amphetamine-dextroamphetamine (Adderall) tablet 15 mg  15 mg oral q AM AUNDREA Rogers-CNP   15 mg at 02/22/24 0852    atorvastatin (Lipitor) tablet 20 mg  20 mg oral Daily AUNDREA Rogers-CNP   20 mg at 02/22/24 0853    buPROPion XL (Wellbutrin XL) 24 hr tablet 300 mg  300 mg oral Daily KAILEY Rogers   300 mg at 02/22/24 0853    busPIRone (Buspar) tablet 5 mg  5 mg oral TID PRN KAILEY Rogers        dextrose 10 % in water  (D10W) infusion  0.3 g/kg/hr intravenous Once PRN Rocky Mae MD        dextrose 50 % injection 25 g  25 g intravenous q15 min PRN Rocky Mae MD        enoxaparin (Lovenox) syringe 40 mg  40 mg subcutaneous q24h Rocky Mae MD   40 mg at 02/21/24 1306    glucagon (Glucagen) injection 1 mg  1 mg intramuscular q15 min PRN Rocky Mae MD        HYDROmorphone (Dilaudid) injection 0.2 mg  0.2 mg intravenous q2h PRN Juliann Hutchison MD        insulin lispro (HumaLOG) injection 0-5 Units  0-5 Units subcutaneous q4h Rocky Mae MD   1 Units at 02/22/24 1100    levothyroxine (Synthroid, Levoxyl) tablet 200 mcg  200 mcg oral Daily before breakfast KAILEY Rogers   200 mcg at 02/22/24 0535    lidocaine 4 % patch 1 patch  1 patch transdermal Daily KAILEY Rogers   1 patch at 02/22/24 0853    methocarbamol (Robaxin) tablet 500 mg  500 mg oral q6h DARLING Rocky Mae MD   500 mg at 02/22/24 1100    naloxone (Narcan) injection 0.2 mg  0.2 mg intravenous PRN KAILEY Rogers        ondansetron (Zofran) injection 4 mg  4 mg intravenous q6h PRN Rocky Mae MD   4 mg at 02/20/24 2012    oxyCODONE (Roxicodone) immediate release tablet 10 mg  10 mg oral q4h PRN Juliann Hutchison MD        oxyCODONE (Roxicodone) immediate release tablet 5 mg  5 mg oral q4h PRN Juliann Hutchison MD        polyethylene glycol (Glycolax, Miralax) packet 17 g  17 g oral Daily Rocky Mae MD   17 g at 02/22/24 0852    potassium chloride CR (Klor-Con M20) ER tablet 20 mEq  20 mEq oral Once KAILEY Rogers           IMAGING SUMMARY:  (summary of new imaging findings, not a copy of dictation)      I have reviewed all laboratory and imaging results ordered/pertinent for today's encounter.

## 2024-02-23 VITALS
TEMPERATURE: 98.4 F | BODY MASS INDEX: 31.55 KG/M2 | HEART RATE: 69 BPM | HEIGHT: 70 IN | DIASTOLIC BLOOD PRESSURE: 81 MMHG | RESPIRATION RATE: 16 BRPM | WEIGHT: 220.35 LBS | SYSTOLIC BLOOD PRESSURE: 136 MMHG | OXYGEN SATURATION: 96 %

## 2024-02-23 LAB
ALBUMIN SERPL BCP-MCNC: 3 G/DL (ref 3.4–5)
ALP SERPL-CCNC: 154 U/L (ref 33–120)
ALT SERPL W P-5'-P-CCNC: 30 U/L (ref 10–52)
ANION GAP SERPL CALC-SCNC: 11 MMOL/L (ref 10–20)
AST SERPL W P-5'-P-CCNC: 17 U/L (ref 9–39)
BILIRUB SERPL-MCNC: 0.5 MG/DL (ref 0–1.2)
BUN SERPL-MCNC: 8 MG/DL (ref 6–23)
CALCIUM SERPL-MCNC: 8.6 MG/DL (ref 8.6–10.6)
CHLORIDE SERPL-SCNC: 99 MMOL/L (ref 98–107)
CO2 SERPL-SCNC: 31 MMOL/L (ref 21–32)
CREAT SERPL-MCNC: 0.77 MG/DL (ref 0.5–1.3)
EGFRCR SERPLBLD CKD-EPI 2021: >90 ML/MIN/1.73M*2
ERYTHROCYTE [DISTWIDTH] IN BLOOD BY AUTOMATED COUNT: 12.6 % (ref 11.5–14.5)
GLUCOSE BLD MANUAL STRIP-MCNC: 135 MG/DL (ref 74–99)
GLUCOSE BLD MANUAL STRIP-MCNC: 150 MG/DL (ref 74–99)
GLUCOSE BLD MANUAL STRIP-MCNC: 163 MG/DL (ref 74–99)
GLUCOSE SERPL-MCNC: 144 MG/DL (ref 74–99)
HCT VFR BLD AUTO: 30.6 % (ref 41–52)
HGB BLD-MCNC: 9.7 G/DL (ref 13.5–17.5)
MAGNESIUM SERPL-MCNC: 1.97 MG/DL (ref 1.6–2.4)
MCH RBC QN AUTO: 27.2 PG (ref 26–34)
MCHC RBC AUTO-ENTMCNC: 31.7 G/DL (ref 32–36)
MCV RBC AUTO: 86 FL (ref 80–100)
NRBC BLD-RTO: 0 /100 WBCS (ref 0–0)
PLATELET # BLD AUTO: 373 X10*3/UL (ref 150–450)
POTASSIUM SERPL-SCNC: 3.6 MMOL/L (ref 3.5–5.3)
PROT SERPL-MCNC: 5.7 G/DL (ref 6.4–8.2)
RBC # BLD AUTO: 3.57 X10*6/UL (ref 4.5–5.9)
SODIUM SERPL-SCNC: 137 MMOL/L (ref 136–145)
WBC # BLD AUTO: 5.7 X10*3/UL (ref 4.4–11.3)

## 2024-02-23 PROCEDURE — 82947 ASSAY GLUCOSE BLOOD QUANT: CPT

## 2024-02-23 PROCEDURE — 2500000005 HC RX 250 GENERAL PHARMACY W/O HCPCS: Performed by: NURSE PRACTITIONER

## 2024-02-23 PROCEDURE — 36415 COLL VENOUS BLD VENIPUNCTURE: CPT

## 2024-02-23 PROCEDURE — 2500000002 HC RX 250 W HCPCS SELF ADMINISTERED DRUGS (ALT 637 FOR MEDICARE OP, ALT 636 FOR OP/ED)

## 2024-02-23 PROCEDURE — 80053 COMPREHEN METABOLIC PANEL: CPT

## 2024-02-23 PROCEDURE — 2500000001 HC RX 250 WO HCPCS SELF ADMINISTERED DRUGS (ALT 637 FOR MEDICARE OP): Performed by: NURSE PRACTITIONER

## 2024-02-23 PROCEDURE — 85027 COMPLETE CBC AUTOMATED: CPT

## 2024-02-23 PROCEDURE — 2500000001 HC RX 250 WO HCPCS SELF ADMINISTERED DRUGS (ALT 637 FOR MEDICARE OP): Performed by: PODIATRIST

## 2024-02-23 PROCEDURE — 2500000004 HC RX 250 GENERAL PHARMACY W/ HCPCS (ALT 636 FOR OP/ED)

## 2024-02-23 PROCEDURE — 2500000001 HC RX 250 WO HCPCS SELF ADMINISTERED DRUGS (ALT 637 FOR MEDICARE OP)

## 2024-02-23 PROCEDURE — 83735 ASSAY OF MAGNESIUM: CPT

## 2024-02-23 RX ORDER — OXYCODONE HYDROCHLORIDE 5 MG/1
5 TABLET ORAL EVERY 6 HOURS PRN
Qty: 10 TABLET | Refills: 0 | Status: SHIPPED | OUTPATIENT
Start: 2024-02-23

## 2024-02-23 RX ORDER — OXYCODONE HYDROCHLORIDE 5 MG/1
5 TABLET ORAL EVERY 6 HOURS PRN
Qty: 10 TABLET | Refills: 0 | Status: SHIPPED | OUTPATIENT
Start: 2024-02-23 | End: 2024-02-23 | Stop reason: SDUPTHER

## 2024-02-23 RX ORDER — METHOCARBAMOL 500 MG/1
500 TABLET, FILM COATED ORAL 3 TIMES DAILY PRN
Qty: 10 TABLET | Refills: 0 | Status: SHIPPED | OUTPATIENT
Start: 2024-02-23

## 2024-02-23 RX ORDER — POTASSIUM CHLORIDE 20 MEQ/1
40 TABLET, EXTENDED RELEASE ORAL ONCE
Status: COMPLETED | OUTPATIENT
Start: 2024-02-23 | End: 2024-02-23

## 2024-02-23 RX ADMIN — LIDOCAINE 1 PATCH: 4 PATCH TOPICAL at 08:39

## 2024-02-23 RX ADMIN — DEXTROAMPHETAMINE SACCHARATE, AMPHETAMINE ASPARTATE, DEXTROAMPHETAMINE SULFATE, AND AMPHETAMINE SULFATE 15 MG: 2.5; 2.5; 2.5; 2.5 TABLET ORAL at 08:38

## 2024-02-23 RX ADMIN — ATORVASTATIN CALCIUM 20 MG: 20 TABLET, FILM COATED ORAL at 08:38

## 2024-02-23 RX ADMIN — ACETAMINOPHEN 650 MG: 325 TABLET ORAL at 13:57

## 2024-02-23 RX ADMIN — BUPROPION HYDROCHLORIDE 300 MG: 150 TABLET, FILM COATED, EXTENDED RELEASE ORAL at 08:38

## 2024-02-23 RX ADMIN — POLYETHYLENE GLYCOL 3350 17 G: 17 POWDER, FOR SOLUTION ORAL at 08:38

## 2024-02-23 RX ADMIN — ACETAMINOPHEN 650 MG: 325 TABLET ORAL at 05:00

## 2024-02-23 RX ADMIN — LEVOTHYROXINE SODIUM 200 MCG: 100 TABLET ORAL at 05:00

## 2024-02-23 RX ADMIN — POTASSIUM CHLORIDE 40 MEQ: 1500 TABLET, EXTENDED RELEASE ORAL at 08:38

## 2024-02-23 RX ADMIN — ENOXAPARIN SODIUM 40 MG: 100 INJECTION SUBCUTANEOUS at 13:57

## 2024-02-23 RX ADMIN — OXYCODONE HYDROCHLORIDE 10 MG: 5 TABLET ORAL at 05:00

## 2024-02-23 RX ADMIN — METHOCARBAMOL 500 MG: 500 TABLET ORAL at 11:47

## 2024-02-23 RX ADMIN — METHOCARBAMOL 500 MG: 500 TABLET ORAL at 05:00

## 2024-02-23 ASSESSMENT — PAIN SCALES - GENERAL
PAINLEVEL_OUTOF10: 4
PAINLEVEL_OUTOF10: 4
PAINLEVEL_OUTOF10: 7
PAINLEVEL_OUTOF10: 0 - NO PAIN

## 2024-02-23 ASSESSMENT — PAIN - FUNCTIONAL ASSESSMENT
PAIN_FUNCTIONAL_ASSESSMENT: 0-10

## 2024-02-23 ASSESSMENT — PAIN DESCRIPTION - LOCATION: LOCATION: ABDOMEN

## 2024-02-23 NOTE — DISCHARGE SUMMARY
Discharge Diagnosis  Acute cholecystitis    Issues Requiring Follow-Up  [ ] ACS/ trauma clinic- post op check s/p open sub-total cholecystectomy on 2/20/24    Test Results Pending At Discharge  Pending Labs       Order Current Status    Surgical Pathology Exam In process    Tissue/Wound Culture/Smear Preliminary result            Hospital Course  43 year old M with pMHhx of HTN, DLD, T2DM, hypothyroidism, ADHD, anxiety, depression, posterior mediastinal mass s/p EUS+ERCP+biopsy on 11/30/2023, cholelithiasis and CBD stenosis s/p stent with interval resolution and removal of stent 1 month ago who presented with 4 days of RUQ and epigastric pain to the ED. In the ED, he had elevated T.bili to 2.7, without fever or AMS, and positive Mcelroy's sign. On CT, he appeared to have cholecystitis and non-dilated CBD.  GI is consulted for evaluation of possible CBD stricture due to patient's history. He was admitted to acute care surgery for further work up. On 2/20/24 he underwent a MRCP showing acute cholecystitis, CBD is not dilated and w/o evidence of stricture. He then underwent laparoscopic to open partial, reconsituted Cholecystectomy  on 2/20/24 with Dr. Leroy. Postoperatively patient did well. He was started on a trial po diet and advanced as tolerated to a regular diet. He tolerated it well. His pain was well controlled with multimodal pain medications. He was ambulating out of bed without difficulty. Patient discharged home to follow up with Trauma/ACS clinic in 2 weeks for post op check.      Pertinent Physical Exam At Time of Discharge  Physical Exam  Constitutional:       Appearance: Normal appearance.   HENT:      Head: Normocephalic and atraumatic.   Cardiovascular:      Rate and Rhythm: Normal rate and regular rhythm.   Pulmonary:      Effort: Pulmonary effort is normal.      Breath sounds: Normal breath sounds.      Comments: Breathing on Room Air.   Abdominal:      General: Bowel sounds are normal. There is no  distension.      Palpations: Abdomen is soft.      Tenderness: There is no abdominal tenderness.      Comments: Incisions well co-apted with staples intact. No erythema, no drainage, no calor.    Musculoskeletal:         General: No swelling. Normal range of motion.      Cervical back: Normal range of motion and neck supple.   Skin:     General: Skin is warm.   Neurological:      General: No focal deficit present.      Mental Status: He is alert and oriented to person, place, and time. Mental status is at baseline.   Psychiatric:         Mood and Affect: Mood normal.         Behavior: Behavior normal.         Thought Content: Thought content normal.         Judgment: Judgment normal.         Home Medications     Medication List      START taking these medications     methocarbamol 500 mg tablet; Commonly known as: Robaxin; Take 1 tablet   (500 mg) by mouth 3 times a day as needed for muscle spasms.     CONTINUE taking these medications     acetaminophen 325 mg tablet; Commonly known as: Tylenol; Take 2 tablets   (650 mg) by mouth every 4 hours if needed for mild pain (1 - 3) or fever   (temp greater than 38.0 C).   amphetamine-dextroamphetamine 15 mg tablet; Commonly known as: Adderall   atorvastatin 20 mg tablet; Commonly known as: Lipitor   buPROPion  mg 24 hr tablet; Commonly known as: Wellbutrin XL   busPIRone 5 mg tablet; Commonly known as: Buspar   cyanocobalamin 250 mcg tablet; Commonly known as: Vitamin B-12   FISH OIL ORAL   flaxseed oiL oil   levothyroxine 200 mcg tablet; Commonly known as: Synthroid, Levoxyl   lisinopril 10 mg tablet   metFORMIN 500 mg tablet; Commonly known as: Glucophage   Mounjaro 10 mg/0.5 mL pen injector; Generic drug: tirzepatide   oxyCODONE 5 mg immediate release tablet; Commonly known as: Roxicodone;   Take 1 tablet (5 mg) by mouth every 6 hours if needed for moderate pain (4   - 6) for up to 10 doses.   pimecrolimus 1 % cream; Commonly known as: Nadya Cordero  Follow-Up  No future appointments.    Juliann Hutchison MD

## 2024-02-23 NOTE — HOSPITAL COURSE
43 year old M with pMHhx of HTN, DLD, T2DM, hypothyroidism, ADHD, anxiety, depression, posterior mediastinal mass s/p EUS+ERCP+biopsy on 11/30/2023, cholelithiasis and CBD stenosis s/p stent with interval resolution and removal of stent 1 month ago who presented with 4 days of RUQ and epigastric pain to the ED. In the ED, he had elevated T.bili to 2.7, without fever or AMS, and positive Mcelroy's sign. On CT, he appeared to have cholecystitis and non-dilated CBD.  GI is consulted for evaluation of possible CBD stricture due to patient's history. He was admitted to acute care surgery for further work up. On 2/20/24 he underwent a MRCP showing acute cholecystitis, CBD is not dilated and w/o evidence of stricture. He then underwent laparoscopic to open partial, reconsituted Cholecystectomy  on 2/20/24 with Dr. Leroy. Postoperatively patient did well. He was started on a trial po diet and advanced as tolerated to a regular diet. He tolerated it well. His pain was well controlled with multimodal pain medications. He was ambulating out of bed without difficulty. Patient discharged home to follow up with Trauma/ACS clinic in 2 weeks for post op check.

## 2024-02-26 LAB
BACTERIA SPEC CULT: ABNORMAL
BACTERIA SPEC CULT: ABNORMAL
GRAM STN SPEC: ABNORMAL
GRAM STN SPEC: ABNORMAL
LABORATORY COMMENT REPORT: NORMAL
PATH REPORT.FINAL DX SPEC: NORMAL
PATH REPORT.GROSS SPEC: NORMAL
PATH REPORT.RELEVANT HX SPEC: NORMAL
PATH REPORT.TOTAL CANCER: NORMAL

## 2024-02-29 NOTE — PROGRESS NOTES
[facility]  TRAUMA CLINIC PROGRESS NOTE    Patient Name: Dk Alas  MRN: [id number]  Admit Date:   : 1980  AGE: 43 y.o.   GENDER: male  ==============================================================================  CHIEF COMPLAINT:   S/p cholecystectomy      OTHER MEDICAL PROBLEMS:  HTN  DLD  T2DM  hypothyroidism  ADHD  anxiety  depression  posterior mediastinal mass s/p EUS+ERCP+biopsy on 2023  cholelithiasis and CBD stenosis s/p stent with interval resolution and removal of stent 1 month ago    INCIDENTAL FINDINGS:  NA    PROCEDURES:  2024: Laproscopic to open partial, reconsituted Cholecystectomy   IR drain placed 3/5/2024    PATHOLOGY:  FINAL DIAGNOSIS   A.  Gallbladder, cholecystectomy:  Marked acute and chronic cholecystitis with cholelithiasis and serosal fibroinflammatory reaction.     ==============================================================================  TODAY'S ASSESSMENT AND PLAN OF CARE:  S/p Cholecystectomy, s/p IR drain  Continue with Drain and monitoring output, color  Continue daily packing in incision  Follow up in 2 weeks with ACS clinic for drain reassessment    WOUND CARE  - Take daily showers  - Allow warm, soapy water to wash over wound  - Do not scrub at the wound  - When out of the shower, gently pat the wound dry.  - Do not apply lotions, ointments or creams  - Avoid soaking in bodies of water (bathtub, hot tubs, pools, lakes, etc) until wound is completely healed  - No heavy lifting > 15 lbs until 6 weeks after surgery    PACKING THE WOUND  - after your shower take 1/4  inch Nugauze, moisten with Sterile saline and insert it into the opening.    * the Nugauze should be moist, not soaking wet, please make sure to wring it out.  - cover the packed area with a 4x4 and paper tape.      FOLLOW UP/CALL  - Follow up in 2 weeks trauma/ACS follow up for drain assessment, wound check  - May return to work or school   - Return to clinic  or ER sooner if pt. has any development of erythema, drainage, swelling, pain, fevers, or chills  - If you have questions or concerns that are not urgent, please feel free to call  [phone].  - Call [phone] to make additional appointment(s) as needed if unable to reschedule in office today    ==============================================================================  HISTORY OF PRESENT ILLNESS  This is a 43 year old male with a history of CBD stenosis (unclear etiology) s/p ERCP with stent and subsequent removal, post-stent removal cholecystitis s/p open partial, reconstituted cholecystectomy on 2/20/24 with Dr. Leroy who presents as a transfer from an OSH due to drainage from his wound and concern for abscess seen on imaging with a leukocytosis of 17.0, now nml. IR drain placed on 3/5/2024. Patient started on PO augmentin (switched to bactrim at time of discharge due to resistance to augmentin/ culture results) with and end date of 3/10. At time of discharge patient tolerating PO, patient ambulatory and pain was managed well.     At time of discharge, recommended to follow up with PCP, IR and trauma/ACS clinic.    At today's clinic appointment, patient is doing well. He is eating, drinking, voiding and having flatus, bowel movements. He states his drain has been putting out about 30 ml since discharge, he said at one point the amount did lessen to amount 25 ml however recently has been back to about 30 ml. He states the color has remained consistent, bilious output.      MEDICAL HISTORY / ROS:  Admission history and ROS reviewed.   Patient denies:  fevers; chills; headache;  dizziness; chest pain; shortness of breath; nausea/vomiting/diarrhea/constipation; new/worsening abdominal pain or numbness/tingling/weakness of extremities.   Pertinent changes as follows:  none    PHYSICAL EXAM:  GCS 15, A+OX3, RRR, S1, S2, CTA=, no increased WOB.   Abd soft, nt, nd. R side IR drain, with bilious output. RUQ with  packing, repacked with 1/4in nugauze. Healing post op Incisional wound about 3cm length with tissue granulation. (Picture in media) No surrounding erythema, edema, no discharge, no drainage.   MAEx4, DELON 5/5 x4, no extremity edema noted. 2+pp.     LABS:  No results found for this or any previous visit (from the past 24 hour(s)).  MEDICATIONS:  Current Outpatient Medications   Medication Sig Dispense Refill    acetaminophen (Tylenol) 325 mg tablet Take 2 tablets (650 mg) by mouth every 4 hours if needed for mild pain (1 - 3) or fever (temp greater than 38.0 C). 30 tablet 0    amphetamine-dextroamphetamine (Adderall) 15 mg tablet Take 1 tablet (15 mg) by mouth once daily in the morning. Do not crush or chew.      atorvastatin (Lipitor) 20 mg tablet Take 1 tablet (20 mg) by mouth once daily.      buPROPion XL (Wellbutrin XL) 300 mg 24 hr tablet Take 1 tablet (300 mg) by mouth once daily. Do not crush, chew, or split.      busPIRone (Buspar) 5 mg tablet Take by mouth 3 times a day as needed.      cyanocobalamin (Vitamin B-12) 250 mcg tablet Take by mouth once daily.      docosahexaenoic acid/epa (FISH OIL ORAL) Take by mouth.      flaxseed oiL oil       levothyroxine (Synthroid, Levoxyl) 200 mcg tablet Take 1 tablet (200 mcg) by mouth once daily in the morning. Take before meals.      lisinopril 10 mg tablet Take 1 tablet (10 mg) by mouth once daily.      metFORMIN (Glucophage) 500 mg tablet Take 1 tablet (500 mg) by mouth. Take 1 or 2 tablets daily at bedtime. Do not crush, chew, or split.      methocarbamol (Robaxin) 500 mg tablet Take 1 tablet (500 mg) by mouth 3 times a day as needed for muscle spasms. 10 tablet 0    oxyCODONE (Roxicodone) 5 mg immediate release tablet Take 1 tablet (5 mg) by mouth every 6 hours if needed for moderate pain (4 - 6) for up to 10 doses. 10 tablet 0    pimecrolimus (Elidel) 1 % cream Apply 1 Application topically once daily. To face      tirzepatide (Mounjaro) 10 mg/0.5 mL pen injector  Inject 10 mg under the skin. Takes one time weekly       No current facility-administered medications for this visit.       IMAGING SUMMARY:  (summary of new imaging findings, not a copy of dictation)  NA    I have reviewed all laboratory and imaging results ordered/pertinent for today's encounter.

## 2024-03-01 ENCOUNTER — HOSPITAL ENCOUNTER (INPATIENT)
Facility: HOSPITAL | Age: 44
LOS: 6 days | Discharge: HOME | End: 2024-03-07
Attending: EMERGENCY MEDICINE | Admitting: SURGERY
Payer: COMMERCIAL

## 2024-03-01 DIAGNOSIS — T81.40XA POSTOPERATIVE INFECTION, UNSPECIFIED TYPE, INITIAL ENCOUNTER: ICD-10-CM

## 2024-03-01 DIAGNOSIS — L02.211 ABSCESS OF ABDOMINAL WALL: Primary | ICD-10-CM

## 2024-03-01 LAB
ABO GROUP (TYPE) IN BLOOD: NORMAL
ALBUMIN SERPL BCP-MCNC: 3.5 G/DL (ref 3.4–5)
ALP SERPL-CCNC: 155 U/L (ref 33–120)
ALT SERPL W P-5'-P-CCNC: 30 U/L (ref 10–52)
ANION GAP SERPL CALC-SCNC: 16 MMOL/L (ref 10–20)
ANTIBODY SCREEN: NORMAL
AST SERPL W P-5'-P-CCNC: 15 U/L (ref 9–39)
BILIRUB DIRECT SERPL-MCNC: 0.2 MG/DL (ref 0–0.3)
BILIRUB SERPL-MCNC: 1 MG/DL (ref 0–1.2)
BUN SERPL-MCNC: 10 MG/DL (ref 6–23)
CALCIUM SERPL-MCNC: 9.3 MG/DL (ref 8.6–10.6)
CHLORIDE SERPL-SCNC: 100 MMOL/L (ref 98–107)
CO2 SERPL-SCNC: 24 MMOL/L (ref 21–32)
CREAT SERPL-MCNC: 0.88 MG/DL (ref 0.5–1.3)
EGFRCR SERPLBLD CKD-EPI 2021: >90 ML/MIN/1.73M*2
ERYTHROCYTE [DISTWIDTH] IN BLOOD BY AUTOMATED COUNT: 12.7 % (ref 11.5–14.5)
GLUCOSE BLD MANUAL STRIP-MCNC: 96 MG/DL (ref 74–99)
GLUCOSE SERPL-MCNC: 107 MG/DL (ref 74–99)
HCT VFR BLD AUTO: 34.1 % (ref 41–52)
HGB BLD-MCNC: 10.9 G/DL (ref 13.5–17.5)
HOLD SPECIMEN: NORMAL
HOLD SPECIMEN: NORMAL
MAGNESIUM SERPL-MCNC: 1.96 MG/DL (ref 1.6–2.4)
MCH RBC QN AUTO: 25.5 PG (ref 26–34)
MCHC RBC AUTO-ENTMCNC: 32 G/DL (ref 32–36)
MCV RBC AUTO: 80 FL (ref 80–100)
NRBC BLD-RTO: 0 /100 WBCS (ref 0–0)
PHOSPHATE SERPL-MCNC: 4.2 MG/DL (ref 2.5–4.9)
PLATELET # BLD AUTO: 515 X10*3/UL (ref 150–450)
POTASSIUM SERPL-SCNC: 3.8 MMOL/L (ref 3.5–5.3)
PROT SERPL-MCNC: 7 G/DL (ref 6.4–8.2)
RBC # BLD AUTO: 4.27 X10*6/UL (ref 4.5–5.9)
RH FACTOR (ANTIGEN D): NORMAL
SODIUM SERPL-SCNC: 136 MMOL/L (ref 136–145)
VANCOMYCIN SERPL-MCNC: 13 UG/ML (ref 5–20)
WBC # BLD AUTO: 14.7 X10*3/UL (ref 4.4–11.3)

## 2024-03-01 PROCEDURE — 84100 ASSAY OF PHOSPHORUS: CPT

## 2024-03-01 PROCEDURE — 99285 EMERGENCY DEPT VISIT HI MDM: CPT

## 2024-03-01 PROCEDURE — 82947 ASSAY GLUCOSE BLOOD QUANT: CPT

## 2024-03-01 PROCEDURE — 83735 ASSAY OF MAGNESIUM: CPT

## 2024-03-01 PROCEDURE — 80048 BASIC METABOLIC PNL TOTAL CA: CPT

## 2024-03-01 PROCEDURE — 85027 COMPLETE CBC AUTOMATED: CPT

## 2024-03-01 PROCEDURE — 84075 ASSAY ALKALINE PHOSPHATASE: CPT

## 2024-03-01 PROCEDURE — 36415 COLL VENOUS BLD VENIPUNCTURE: CPT

## 2024-03-01 PROCEDURE — 87040 BLOOD CULTURE FOR BACTERIA: CPT

## 2024-03-01 PROCEDURE — 36415 COLL VENOUS BLD VENIPUNCTURE: CPT | Performed by: STUDENT IN AN ORGANIZED HEALTH CARE EDUCATION/TRAINING PROGRAM

## 2024-03-01 PROCEDURE — 1100000001 HC PRIVATE ROOM DAILY

## 2024-03-01 PROCEDURE — 99285 EMERGENCY DEPT VISIT HI MDM: CPT | Performed by: EMERGENCY MEDICINE

## 2024-03-01 PROCEDURE — 80202 ASSAY OF VANCOMYCIN: CPT | Performed by: PHARMACIST

## 2024-03-01 PROCEDURE — 86850 RBC ANTIBODY SCREEN: CPT | Performed by: STUDENT IN AN ORGANIZED HEALTH CARE EDUCATION/TRAINING PROGRAM

## 2024-03-01 PROCEDURE — 2500000004 HC RX 250 GENERAL PHARMACY W/ HCPCS (ALT 636 FOR OP/ED)

## 2024-03-01 RX ORDER — OXYCODONE HYDROCHLORIDE 5 MG/1
10 TABLET ORAL EVERY 4 HOURS PRN
Status: DISCONTINUED | OUTPATIENT
Start: 2024-03-01 | End: 2024-03-07 | Stop reason: HOSPADM

## 2024-03-01 RX ORDER — SODIUM CHLORIDE, SODIUM LACTATE, POTASSIUM CHLORIDE, CALCIUM CHLORIDE 600; 310; 30; 20 MG/100ML; MG/100ML; MG/100ML; MG/100ML
100 INJECTION, SOLUTION INTRAVENOUS CONTINUOUS
Status: DISCONTINUED | OUTPATIENT
Start: 2024-03-01 | End: 2024-03-03

## 2024-03-01 RX ORDER — BUSPIRONE HYDROCHLORIDE 5 MG/1
5 TABLET ORAL 3 TIMES DAILY
Status: DISCONTINUED | OUTPATIENT
Start: 2024-03-01 | End: 2024-03-02

## 2024-03-01 RX ORDER — ACETAMINOPHEN 325 MG/1
650 TABLET ORAL EVERY 6 HOURS
Status: DISCONTINUED | OUTPATIENT
Start: 2024-03-01 | End: 2024-03-07 | Stop reason: HOSPADM

## 2024-03-01 RX ORDER — DEXTROSE 50 % IN WATER (D50W) INTRAVENOUS SYRINGE
25
Status: DISCONTINUED | OUTPATIENT
Start: 2024-03-01 | End: 2024-03-07 | Stop reason: HOSPADM

## 2024-03-01 RX ORDER — PNV NO.95/FERROUS FUM/FOLIC AC 28MG-0.8MG
250 TABLET ORAL DAILY
Status: DISCONTINUED | OUTPATIENT
Start: 2024-03-02 | End: 2024-03-07 | Stop reason: HOSPADM

## 2024-03-01 RX ORDER — ENOXAPARIN SODIUM 100 MG/ML
40 INJECTION SUBCUTANEOUS EVERY 24 HOURS
Status: DISCONTINUED | OUTPATIENT
Start: 2024-03-01 | End: 2024-03-07 | Stop reason: HOSPADM

## 2024-03-01 RX ORDER — DEXTROSE MONOHYDRATE 100 MG/ML
0.3 INJECTION, SOLUTION INTRAVENOUS ONCE AS NEEDED
Status: DISCONTINUED | OUTPATIENT
Start: 2024-03-01 | End: 2024-03-07 | Stop reason: HOSPADM

## 2024-03-01 RX ORDER — BUPROPION HYDROCHLORIDE 150 MG/1
300 TABLET ORAL DAILY
Status: DISCONTINUED | OUTPATIENT
Start: 2024-03-02 | End: 2024-03-07 | Stop reason: HOSPADM

## 2024-03-01 RX ORDER — OXYCODONE HYDROCHLORIDE 5 MG/1
5 TABLET ORAL EVERY 4 HOURS PRN
Status: DISCONTINUED | OUTPATIENT
Start: 2024-03-01 | End: 2024-03-07 | Stop reason: HOSPADM

## 2024-03-01 RX ORDER — NALOXONE HYDROCHLORIDE 0.4 MG/ML
0.2 INJECTION, SOLUTION INTRAMUSCULAR; INTRAVENOUS; SUBCUTANEOUS EVERY 5 MIN PRN
Status: DISCONTINUED | OUTPATIENT
Start: 2024-03-01 | End: 2024-03-07 | Stop reason: HOSPADM

## 2024-03-01 RX ORDER — LEVOTHYROXINE SODIUM 200 UG/1
200 TABLET ORAL
Status: DISCONTINUED | OUTPATIENT
Start: 2024-03-02 | End: 2024-03-07 | Stop reason: HOSPADM

## 2024-03-01 RX ORDER — ATORVASTATIN CALCIUM 20 MG/1
20 TABLET, FILM COATED ORAL DAILY
Status: DISCONTINUED | OUTPATIENT
Start: 2024-03-02 | End: 2024-03-07 | Stop reason: HOSPADM

## 2024-03-01 RX ADMIN — ACETAMINOPHEN 650 MG: 325 TABLET ORAL at 22:40

## 2024-03-01 RX ADMIN — SODIUM CHLORIDE, POTASSIUM CHLORIDE, SODIUM LACTATE AND CALCIUM CHLORIDE 100 ML/HR: 600; 310; 30; 20 INJECTION, SOLUTION INTRAVENOUS at 22:41

## 2024-03-01 RX ADMIN — ENOXAPARIN SODIUM 40 MG: 100 INJECTION SUBCUTANEOUS at 22:40

## 2024-03-01 ASSESSMENT — PAIN DESCRIPTION - ORIENTATION: ORIENTATION: RIGHT

## 2024-03-01 ASSESSMENT — PAIN SCALES - GENERAL
PAINLEVEL_OUTOF10: 0 - NO PAIN

## 2024-03-01 ASSESSMENT — PAIN - FUNCTIONAL ASSESSMENT
PAIN_FUNCTIONAL_ASSESSMENT: 0-10
PAIN_FUNCTIONAL_ASSESSMENT: 0-10

## 2024-03-01 ASSESSMENT — PAIN DESCRIPTION - LOCATION: LOCATION: ABDOMEN

## 2024-03-01 NOTE — H&P
[facility]  ACUTE CARE SURGERY - HISTORY AND PHYSICAL / CONSULT    Patient Name: Dk Alas  MRN: [id number]  Admit Date: 301  : 1980  AGE: 43 y.o.   GENDER: male  ==============================================================================  TODAY'S ASSESSMENT AND PLAN OF CARE:  Patient is a 43 year old male with a history of CBD stenosis (unclear etiology) s/p ERCP with stent and subsequent removal, post-stent removal cholecystitis s/p open partial, reconstituted cholecystectomy on 24 with Dr. Leroy who presents as a transfer from an OSH due to drainage from his wound and concern for abscess seen on imaging with a leukocytosis of 17.0.    Plan:  - Admit to ACS  - Obtain repeat labs    - Start vanc/zosyn  - Will get OSH CT A/P uploaded STAT  - NPO/mIVF until imaging is reviewed  - Will remove staples from area of wound that is draining to facilitate drainage   - CONTINUE home lipitor, wellbutrin, vit b-12, levothyroxine  - HOLD home metformin, lisinopril, Adderall  - Insulin sliding scale   - PRN pain medications   - DVT ppx: lovenox, SCDs    Gricel Ibanez, PGY2  General Surgery  ==============================================================================  CHIEF COMPLAINT/REASON FOR CONSULT:  Patient is a 43 year old male with a history of CBD stenosis (unclear etiology) s/p ERCP with stent and subsequent removal, post-stent removal cholecystitis s/p open partial, reconstituted cholecystectomy on 24 with Dr. Leroy who presents as a transfer from an OSH due to drainage from his wound and concern for abscess seen on imaging.     Per patient, the last few days, he has had increasing pain and fullness under his surgical incision. As of this morning, he noted the area around the incision was red and he started having murky, red-tinged drainage from the wound. He says since the drainage has started the wound is bulging less. He denies fever or chills. Has been eating  and drinking without n/v.    PAST MEDICAL HISTORY:   PMH:   T2DM  ADHD  HTN  HLD  Hypothyroidism   Mediastinal mass s/p removal   CBD stenosis of unknown etiology s/p stent placement and removal     PSH:   Open lori  Mediastinal mass resection     FH:   Family History   Problem Relation Name Age of Onset    Diabetes Mother      Hypertension Mother      Heart attack Mother      Heart attack Maternal Grandmother       SOCIAL HISTORY:  Former smoker    MEDICATIONS:   Prior to Admission medications    Medication Sig Start Date End Date Taking? Authorizing Provider   acetaminophen (Tylenol) 325 mg tablet Take 2 tablets (650 mg) by mouth every 4 hours if needed for mild pain (1 - 3) or fever (temp greater than 38.0 C). 1/31/24   Ama Dodson PA-C   amphetamine-dextroamphetamine (Adderall) 15 mg tablet Take 1 tablet (15 mg) by mouth once daily in the morning. Do not crush or chew.    Historical Provider, MD   atorvastatin (Lipitor) 20 mg tablet Take 1 tablet (20 mg) by mouth once daily.    Historical Provider, MD   buPROPion XL (Wellbutrin XL) 300 mg 24 hr tablet Take 1 tablet (300 mg) by mouth once daily. Do not crush, chew, or split.    Historical Provider, MD   busPIRone (Buspar) 5 mg tablet Take by mouth 3 times a day as needed.    Historical Provider, MD   cyanocobalamin (Vitamin B-12) 250 mcg tablet Take by mouth once daily.    Historical Provider, MD   docosahexaenoic acid/epa (FISH OIL ORAL) Take by mouth.    Historical Provider, MD   flaxseed oiL oil     Historical Provider, MD   levothyroxine (Synthroid, Levoxyl) 200 mcg tablet Take 1 tablet (200 mcg) by mouth once daily in the morning. Take before meals.    Historical Provider, MD   lisinopril 10 mg tablet Take 1 tablet (10 mg) by mouth once daily.    Historical Provider, MD   metFORMIN (Glucophage) 500 mg tablet Take 1 tablet (500 mg) by mouth. Take 1 or 2 tablets daily at bedtime. Do not crush, chew, or split.    Historical Provider, MD   methocarbamol  (Robaxin) 500 mg tablet Take 1 tablet (500 mg) by mouth 3 times a day as needed for muscle spasms. 2/23/24   Juliann Hutchison MD   oxyCODONE (Roxicodone) 5 mg immediate release tablet Take 1 tablet (5 mg) by mouth every 6 hours if needed for moderate pain (4 - 6) for up to 10 doses. 2/23/24   Juliann Hutchison MD   pimecrolimus (Elidel) 1 % cream Apply 1 Application topically once daily. To face 1/2/24   Historical Provider, MD   tirzepatide (Mounjaro) 10 mg/0.5 mL pen injector Inject 10 mg under the skin. Takes one time weekly    Historical Provider, MD     ALLERGIES:   Allergies   Allergen Reactions    Ciprofloxacin Hives and Other     Rapid heart rate, fainted    Nsaids (Non-Steroidal Anti-Inflammatory Drug) Hives       REVIEW OF SYSTEMS:  Review of Systems  Negative beyond what is noted in above HPI.     PHYSICAL EXAM:  Physical Exam\  GEN: lying in bed, NAD   HEAD: atraumatic   RESP: breathing comfortably on room air   CV: well perfused, regular rate, no tachycardia  ABD: soft, localized tenderness to the RUQ, RUQ incision closed with staples, surrounding erythema with scant, murky drainage from lateral aspect of wound, nondistended  : no doyle   NEURO: no focal deficits appreciated   PSYCH: appropriate     IMAGING SUMMARY:  (summary of findings, not a copy of dictation)  CT A/P report GB fossa abscess measuring 7.7 by 3.8 by 4.4 cm and a RUQ abscess within the rectus muscle underlying the stapled incision. Awaiting image upload for review.     LABS:  WBC 17.3  Tbili 0.8   Alk phos 153, otherwise normal LFTs       I have reviewed all laboratory and imaging results ordered/pertinent for this encounter.

## 2024-03-01 NOTE — ED PROVIDER NOTES
HPI   Chief Complaint   Patient presents with    Post-op Problem       HPI  The patient is a 43-year-old male with past medical history of hypertension, hyperlipidemia, type 2 diabetes and recent cholecystectomy with acute care surgery who presents as a transfer from outside hospital in order to undergo ACS consultation for postoperative infection.  Patient presented to [facility] with increased redness and drainage and pain from the surgical incision site.  He was found to have an abscess of the rectus abdominis and the gallbladder fossa.  He has not had any fevers.  He was given a dose of vancomycin and Zosyn.  States his pain is under good control right now.      PMH:as above.  Meds:reviewed in EMR.  PSH:reviewed in EMR.  allergies:reviewed in EMR.  social:Denies X3.  Family History: non-contributory to acute presentation.    A full 10 point Review of Systems was reviewed with the patient and is negative unless stated in the HPI.                  Elton Coma Scale Score: 15                     Patient History   Past Medical History:   Diagnosis Date    ADHD (attention deficit hyperactivity disorder)     Anxiety     Cholelithiasis     Contusion of right shoulder     Follows with PT Jonnie Rader    Depression     Diabetes mellitus (CMS/HCC)     Manged by PCP    H/O electrocardiogram     NORMAL ECG in 2021    History of ERCP 01/09/2024    Hyperlipidemia     Hypertension     Hypothyroidism     Mediastinal mass 01/04/2024    Gen: Tyrone Benitez    Motion sickness     Obstructive jaundice     Per ERCP on 1/9/24    Pulmonary nodule     solid non-calcified pulmonary nodule measuring greater than 8 mm.     Past Surgical History:   Procedure Laterality Date    BILE DUCT STENT PLACEMENT      Stent placed 11/2023 for narrowing of CBD    CT ANGIO CHEST FOR PULMONARY EMBOLISM      CT scan on 11/28/23    CT ANGIO CORONARY ART WITH HEARTFLOW IF SCORE >30%  10/26/2021    CT ANGIO CORONARY ART WITH HEARTFLOW IF SCORE >30%  10/26/2021     Family History   Problem Relation Name Age of Onset    Diabetes Mother      Hypertension Mother      Heart attack Mother      Heart attack Maternal Grandmother       Social History     Tobacco Use    Smoking status: Former     Types: Cigarettes     Quit date:      Years since quittin.1    Smokeless tobacco: Never   Vaping Use    Vaping Use: Never used   Substance Use Topics    Alcohol use: Never    Drug use: Never       Physical Exam   ED Triage Vitals   Temperature Heart Rate Respirations BP   24 1818 24 1742 24 1742 24 174   37 °C (98.6 °F) 77 16 134/85      Pulse Ox Temp Source Heart Rate Source Patient Position   24 1742 24 1818 24 1742 24 174   100 % Temporal Monitor Lying      BP Location FiO2 (%)     24 --     Right arm        Physical Exam    Physical Exam:    Appearance: Alert, oriented , cooperative,  in no acute distress. Well nourished & well hydrated.    Skin: Intact,  dry skin, no lesions, rash, petechiae or purpura.     Eyes: PERRLA, EOMs intact,  No scleral injection. No scleral icterus.     ENT: Hearing grossly intact. External auditory canals patent. Nares patent, mucus membranes moist. Dentition without lesions.     Neck: Supple, without meningismus. Trachea at midline.     Pulmonary: Clear bilaterally with good chest wall excursion. No rales, rhonchi or wheezing. No accessory muscle use or stridor.    Cardiac: Normal S1, S2 without murmur, rub, gallop or extrasystole. No JVD, Carotids without bruits.    Abdomen: Cholecystectomy site is erythematous with some serosanguineous drainage.  That area is tender to palpation.  Abdomen is overall soft without rebound tenderness, rigidity or guarding.  Staple line is tense from swelling however no evidence of wound dehiscence.    Genitourinary: Exam deferred.    Musculoskeletal:  no edema, or deformity. Pulses full and equal. No cyanosis or clubbing.    Neurological:   Moving all 4 extremities equally, no focal findings identified    Psychiatric: Appropriate mood and affect.     ED Course & MDM   Diagnoses as of 03/01/24 2009   Abscess of abdominal wall   Postoperative infection, unspecified type, initial encounter       Medical Decision Making  The patient is a 43-year-old male with past medical history of hypertension, hyperlipidemia, type 2 diabetes and recent cholecystectomy 2/20/2024 with ACS here at [facility] who presented to the ED as a transfer from [facility] with increased redness and pain at the surgical site found to have a intra-abdominal abscess in the area of the gallbladder fossa and an abscess in the rectus sheath.    Patient mildly hypertensive on arrival otherwise hemodynamically stable and afebrile.  Nonperitoneal on exam however his surgical site was obviously infected.  Preoperative labs were obtained and ACS was consulted.  ACS saw the patient and admitted him to their service.  They redosed his pain medications.  He is made n.p.o. for now.      This patient was discussed with Dr. Banuelos who agrees with clinical decision making.      Javy Kearns MD  Emergency Medicine, PGY3        Procedure  Procedures     Kendrick Kearns MD  Resident  03/01/24 2015    -------------------------------------------  This patient was seen by the resident physician.  I have seen and examined the patient, agree with the workup, evaluation, management and diagnosis. I reviewed and edited the above documentation where necessary.     Nas Banuelos MD   Attending Physician      Nsa Banuelos MD MPH  03/04/24 [id number]

## 2024-03-01 NOTE — ED TRIAGE NOTES
Pt transferred from [facility] for surgical abscess. Pt recently had cholecystectomy and wound began draining today. Staples intact, and edges well approximated.  VSS, Pt is A/O x4 and ambulatory on arrival.

## 2024-03-02 LAB
ALBUMIN SERPL BCP-MCNC: 3.2 G/DL (ref 3.4–5)
ALP SERPL-CCNC: 135 U/L (ref 33–120)
ALT SERPL W P-5'-P-CCNC: 26 U/L (ref 10–52)
ANION GAP SERPL CALC-SCNC: 13 MMOL/L (ref 10–20)
AST SERPL W P-5'-P-CCNC: 13 U/L (ref 9–39)
BASOPHILS # BLD AUTO: 0.05 X10*3/UL (ref 0–0.1)
BASOPHILS NFR BLD AUTO: 0.4 %
BILIRUB DIRECT SERPL-MCNC: 0.2 MG/DL (ref 0–0.3)
BILIRUB SERPL-MCNC: 1 MG/DL (ref 0–1.2)
BUN SERPL-MCNC: 9 MG/DL (ref 6–23)
CALCIUM SERPL-MCNC: 8.9 MG/DL (ref 8.6–10.6)
CHLORIDE SERPL-SCNC: 99 MMOL/L (ref 98–107)
CO2 SERPL-SCNC: 28 MMOL/L (ref 21–32)
CREAT SERPL-MCNC: 0.94 MG/DL (ref 0.5–1.3)
EGFRCR SERPLBLD CKD-EPI 2021: >90 ML/MIN/1.73M*2
EOSINOPHIL # BLD AUTO: 0.2 X10*3/UL (ref 0–0.7)
EOSINOPHIL NFR BLD AUTO: 1.8 %
ERYTHROCYTE [DISTWIDTH] IN BLOOD BY AUTOMATED COUNT: 12.6 % (ref 11.5–14.5)
GLUCOSE BLD MANUAL STRIP-MCNC: 104 MG/DL (ref 74–99)
GLUCOSE BLD MANUAL STRIP-MCNC: 106 MG/DL (ref 74–99)
GLUCOSE BLD MANUAL STRIP-MCNC: 108 MG/DL (ref 74–99)
GLUCOSE BLD MANUAL STRIP-MCNC: 116 MG/DL (ref 74–99)
GLUCOSE BLD MANUAL STRIP-MCNC: 119 MG/DL (ref 74–99)
GLUCOSE BLD MANUAL STRIP-MCNC: 153 MG/DL (ref 74–99)
GLUCOSE BLD MANUAL STRIP-MCNC: 99 MG/DL (ref 74–99)
GLUCOSE SERPL-MCNC: 106 MG/DL (ref 74–99)
HCT VFR BLD AUTO: 30.4 % (ref 41–52)
HGB BLD-MCNC: 9.8 G/DL (ref 13.5–17.5)
IMM GRANULOCYTES # BLD AUTO: 0.07 X10*3/UL (ref 0–0.7)
IMM GRANULOCYTES NFR BLD AUTO: 0.6 % (ref 0–0.9)
LYMPHOCYTES # BLD AUTO: 2.75 X10*3/UL (ref 1.2–4.8)
LYMPHOCYTES NFR BLD AUTO: 24.4 %
MAGNESIUM SERPL-MCNC: 2.02 MG/DL (ref 1.6–2.4)
MCH RBC QN AUTO: 26.7 PG (ref 26–34)
MCHC RBC AUTO-ENTMCNC: 32.2 G/DL (ref 32–36)
MCV RBC AUTO: 83 FL (ref 80–100)
MONOCYTES # BLD AUTO: 0.75 X10*3/UL (ref 0.1–1)
MONOCYTES NFR BLD AUTO: 6.6 %
NEUTROPHILS # BLD AUTO: 7.47 X10*3/UL (ref 1.2–7.7)
NEUTROPHILS NFR BLD AUTO: 66.2 %
NRBC BLD-RTO: 0 /100 WBCS (ref 0–0)
PHOSPHATE SERPL-MCNC: 4.8 MG/DL (ref 2.5–4.9)
PLATELET # BLD AUTO: 479 X10*3/UL (ref 150–450)
POTASSIUM SERPL-SCNC: 4 MMOL/L (ref 3.5–5.3)
PROT SERPL-MCNC: 5.8 G/DL (ref 6.4–8.2)
RBC # BLD AUTO: 3.67 X10*6/UL (ref 4.5–5.9)
SODIUM SERPL-SCNC: 136 MMOL/L (ref 136–145)
VANCOMYCIN SERPL-MCNC: 11.2 UG/ML (ref 5–20)
WBC # BLD AUTO: 11.3 X10*3/UL (ref 4.4–11.3)

## 2024-03-02 PROCEDURE — 82040 ASSAY OF SERUM ALBUMIN: CPT

## 2024-03-02 PROCEDURE — 36415 COLL VENOUS BLD VENIPUNCTURE: CPT

## 2024-03-02 PROCEDURE — 82374 ASSAY BLOOD CARBON DIOXIDE: CPT

## 2024-03-02 PROCEDURE — 85025 COMPLETE CBC W/AUTO DIFF WBC: CPT

## 2024-03-02 PROCEDURE — 84100 ASSAY OF PHOSPHORUS: CPT

## 2024-03-02 PROCEDURE — 36415 COLL VENOUS BLD VENIPUNCTURE: CPT | Performed by: PHARMACIST

## 2024-03-02 PROCEDURE — 1100000001 HC PRIVATE ROOM DAILY

## 2024-03-02 PROCEDURE — 2500000001 HC RX 250 WO HCPCS SELF ADMINISTERED DRUGS (ALT 637 FOR MEDICARE OP)

## 2024-03-02 PROCEDURE — 83735 ASSAY OF MAGNESIUM: CPT

## 2024-03-02 PROCEDURE — 2500000004 HC RX 250 GENERAL PHARMACY W/ HCPCS (ALT 636 FOR OP/ED)

## 2024-03-02 PROCEDURE — 99024 POSTOP FOLLOW-UP VISIT: CPT | Performed by: SURGERY

## 2024-03-02 PROCEDURE — 80202 ASSAY OF VANCOMYCIN: CPT | Performed by: PHARMACIST

## 2024-03-02 PROCEDURE — 82947 ASSAY GLUCOSE BLOOD QUANT: CPT

## 2024-03-02 RX ORDER — HYDROMORPHONE HYDROCHLORIDE 1 MG/ML
0.4 INJECTION, SOLUTION INTRAMUSCULAR; INTRAVENOUS; SUBCUTANEOUS ONCE
Status: COMPLETED | OUTPATIENT
Start: 2024-03-02 | End: 2024-03-02

## 2024-03-02 RX ADMIN — LEVOTHYROXINE SODIUM 200 MCG: 0.2 TABLET ORAL at 08:54

## 2024-03-02 RX ADMIN — ENOXAPARIN SODIUM 40 MG: 100 INJECTION SUBCUTANEOUS at 22:56

## 2024-03-02 RX ADMIN — PIPERACILLIN SODIUM AND TAZOBACTAM SODIUM 3.38 G: 3; .375 INJECTION, SOLUTION INTRAVENOUS at 17:02

## 2024-03-02 RX ADMIN — ACETAMINOPHEN 650 MG: 325 TABLET ORAL at 17:02

## 2024-03-02 RX ADMIN — PIPERACILLIN SODIUM AND TAZOBACTAM SODIUM 3.38 G: 3; .375 INJECTION, SOLUTION INTRAVENOUS at 06:08

## 2024-03-02 RX ADMIN — DEXTROSE MONOHYDRATE 1.25 G: 50 INJECTION, SOLUTION INTRAVENOUS at 02:00

## 2024-03-02 RX ADMIN — ATORVASTATIN CALCIUM 20 MG: 20 TABLET, FILM COATED ORAL at 08:54

## 2024-03-02 RX ADMIN — OXYCODONE HYDROCHLORIDE 10 MG: 5 TABLET ORAL at 22:54

## 2024-03-02 RX ADMIN — BUPROPION HYDROCHLORIDE 300 MG: 150 TABLET, EXTENDED RELEASE ORAL at 08:54

## 2024-03-02 RX ADMIN — DEXTROSE MONOHYDRATE 1.25 G: 50 INJECTION, SOLUTION INTRAVENOUS at 14:06

## 2024-03-02 RX ADMIN — OXYCODONE HYDROCHLORIDE 10 MG: 5 TABLET ORAL at 06:08

## 2024-03-02 RX ADMIN — PIPERACILLIN SODIUM AND TAZOBACTAM SODIUM 3.38 G: 3; .375 INJECTION, SOLUTION INTRAVENOUS at 01:25

## 2024-03-02 RX ADMIN — OXYCODONE HYDROCHLORIDE 10 MG: 5 TABLET ORAL at 10:29

## 2024-03-02 RX ADMIN — HYDROMORPHONE HYDROCHLORIDE 0.4 MG: 1 INJECTION, SOLUTION INTRAMUSCULAR; INTRAVENOUS; SUBCUTANEOUS at 01:23

## 2024-03-02 RX ADMIN — ACETAMINOPHEN 650 MG: 325 TABLET ORAL at 08:54

## 2024-03-02 RX ADMIN — PIPERACILLIN SODIUM AND TAZOBACTAM SODIUM 3.38 G: 3; .375 INJECTION, SOLUTION INTRAVENOUS at 12:25

## 2024-03-02 RX ADMIN — OXYCODONE HYDROCHLORIDE 10 MG: 5 TABLET ORAL at 17:02

## 2024-03-02 RX ADMIN — ACETAMINOPHEN 650 MG: 325 TABLET ORAL at 22:56

## 2024-03-02 RX ADMIN — SODIUM CHLORIDE, POTASSIUM CHLORIDE, SODIUM LACTATE AND CALCIUM CHLORIDE 100 ML/HR: 600; 310; 30; 20 INJECTION, SOLUTION INTRAVENOUS at 17:03

## 2024-03-02 SDOH — SOCIAL STABILITY: SOCIAL INSECURITY: ABUSE: ADULT

## 2024-03-02 SDOH — SOCIAL STABILITY: SOCIAL INSECURITY: DOES ANYONE TRY TO KEEP YOU FROM HAVING/CONTACTING OTHER FRIENDS OR DOING THINGS OUTSIDE YOUR HOME?: NO

## 2024-03-02 SDOH — SOCIAL STABILITY: SOCIAL INSECURITY: DO YOU FEEL ANYONE HAS EXPLOITED OR TAKEN ADVANTAGE OF YOU FINANCIALLY OR OF YOUR PERSONAL PROPERTY?: NO

## 2024-03-02 SDOH — SOCIAL STABILITY: SOCIAL INSECURITY: HAVE YOU HAD THOUGHTS OF HARMING ANYONE ELSE?: NO

## 2024-03-02 SDOH — SOCIAL STABILITY: SOCIAL INSECURITY: HAS ANYONE EVER THREATENED TO HURT YOUR FAMILY OR YOUR PETS?: NO

## 2024-03-02 SDOH — SOCIAL STABILITY: SOCIAL INSECURITY: ARE THERE ANY APPARENT SIGNS OF INJURIES/BEHAVIORS THAT COULD BE RELATED TO ABUSE/NEGLECT?: NO

## 2024-03-02 SDOH — SOCIAL STABILITY: SOCIAL INSECURITY: ARE YOU OR HAVE YOU BEEN THREATENED OR ABUSED PHYSICALLY, EMOTIONALLY, OR SEXUALLY BY ANYONE?: NO

## 2024-03-02 SDOH — SOCIAL STABILITY: SOCIAL INSECURITY: WERE YOU ABLE TO COMPLETE ALL THE BEHAVIORAL HEALTH SCREENINGS?: YES

## 2024-03-02 SDOH — SOCIAL STABILITY: SOCIAL INSECURITY: DO YOU FEEL UNSAFE GOING BACK TO THE PLACE WHERE YOU ARE LIVING?: NO

## 2024-03-02 ASSESSMENT — COGNITIVE AND FUNCTIONAL STATUS - GENERAL
MOBILITY SCORE: 24
STANDING UP FROM CHAIR USING ARMS: A LOT
MOBILITY SCORE: 12
TURNING FROM BACK TO SIDE WHILE IN FLAT BAD: A LOT
DAILY ACTIVITIY SCORE: 24
DAILY ACTIVITIY SCORE: 24
PATIENT BASELINE BEDBOUND: NO
DAILY ACTIVITIY SCORE: 24
MOVING TO AND FROM BED TO CHAIR: A LOT
WALKING IN HOSPITAL ROOM: A LOT
MOBILITY SCORE: 24
PATIENT BASELINE BEDBOUND: NO
MOVING FROM LYING ON BACK TO SITTING ON SIDE OF FLAT BED WITH BEDRAILS: A LOT
CLIMB 3 TO 5 STEPS WITH RAILING: A LOT

## 2024-03-02 ASSESSMENT — PAIN DESCRIPTION - LOCATION
LOCATION: ABDOMEN

## 2024-03-02 ASSESSMENT — PAIN DESCRIPTION - ORIENTATION
ORIENTATION: RIGHT

## 2024-03-02 ASSESSMENT — ACTIVITIES OF DAILY LIVING (ADL)
TOILETING: INDEPENDENT
LACK_OF_TRANSPORTATION: NO
GROOMING: INDEPENDENT
BATHING: INDEPENDENT
JUDGMENT_ADEQUATE_SAFELY_COMPLETE_DAILY_ACTIVITIES: YES
ADEQUATE_TO_COMPLETE_ADL: YES
FEEDING YOURSELF: INDEPENDENT
DRESSING YOURSELF: INDEPENDENT
HEARING - LEFT EAR: FUNCTIONAL
HEARING - RIGHT EAR: FUNCTIONAL
PATIENT'S MEMORY ADEQUATE TO SAFELY COMPLETE DAILY ACTIVITIES?: YES
ADEQUATE_TO_COMPLETE_ADL: YES
PATIENT'S MEMORY ADEQUATE TO SAFELY COMPLETE DAILY ACTIVITIES?: YES
WALKS IN HOME: INDEPENDENT
JUDGMENT_ADEQUATE_SAFELY_COMPLETE_DAILY_ACTIVITIES: YES

## 2024-03-02 ASSESSMENT — PAIN DESCRIPTION - DESCRIPTORS
DESCRIPTORS: ACHING;SORE

## 2024-03-02 ASSESSMENT — PAIN SCALES - GENERAL
PAINLEVEL_OUTOF10: 2
PAINLEVEL_OUTOF10: 4
PAINLEVEL_OUTOF10: 7
PAINLEVEL_OUTOF10: 5 - MODERATE PAIN
PAINLEVEL_OUTOF10: 5 - MODERATE PAIN
PAINLEVEL_OUTOF10: 8
PAINLEVEL_OUTOF10: 3
PAINLEVEL_OUTOF10: 5 - MODERATE PAIN
PAINLEVEL_OUTOF10: 2

## 2024-03-02 ASSESSMENT — PAIN - FUNCTIONAL ASSESSMENT
PAIN_FUNCTIONAL_ASSESSMENT: 0-10

## 2024-03-02 ASSESSMENT — LIFESTYLE VARIABLES
HOW OFTEN DO YOU HAVE A DRINK CONTAINING ALCOHOL: MONTHLY OR LESS
PRESCIPTION_ABUSE_PAST_12_MONTHS: NO
SUBSTANCE_ABUSE_PAST_12_MONTHS: NO
HOW MANY STANDARD DRINKS CONTAINING ALCOHOL DO YOU HAVE ON A TYPICAL DAY: 1 OR 2
AUDIT-C TOTAL SCORE: 2
HOW OFTEN DO YOU HAVE 6 OR MORE DRINKS ON ONE OCCASION: LESS THAN MONTHLY
AUDIT-C TOTAL SCORE: 2
SKIP TO QUESTIONS 9-10: 0

## 2024-03-02 ASSESSMENT — COLUMBIA-SUICIDE SEVERITY RATING SCALE - C-SSRS
2. HAVE YOU ACTUALLY HAD ANY THOUGHTS OF KILLING YOURSELF?: NO
1. IN THE PAST MONTH, HAVE YOU WISHED YOU WERE DEAD OR WISHED YOU COULD GO TO SLEEP AND NOT WAKE UP?: NO
6. HAVE YOU EVER DONE ANYTHING, STARTED TO DO ANYTHING, OR PREPARED TO DO ANYTHING TO END YOUR LIFE?: NO

## 2024-03-02 ASSESSMENT — PAIN SCALES - PAIN ASSESSMENT IN ADVANCED DEMENTIA (PAINAD)
BREATHING: NORMAL

## 2024-03-02 ASSESSMENT — PATIENT HEALTH QUESTIONNAIRE - PHQ9
2. FEELING DOWN, DEPRESSED OR HOPELESS: NOT AT ALL
1. LITTLE INTEREST OR PLEASURE IN DOING THINGS: NOT AT ALL
SUM OF ALL RESPONSES TO PHQ9 QUESTIONS 1 & 2: 0

## 2024-03-02 NOTE — PROGRESS NOTES
"Vancomycin Dosing by Pharmacy- Initial    Dk Alas is a 43 y.o. year old male who Pharmacy has been consulted for vancomycin dosing for intra-abdominal. Based on the patient's indication and renal status this patient is being dosed based on a goal AUC of 400-600.     Renal function can be described as Normal Renal Function    Renal function is at patient's baseline      Visit Vitals  /79 (BP Location: Left arm, Patient Position: Sitting)   Pulse 76   Temp 37 °C (98.6 °F) (Temporal)   Resp 16        Lab Results   Component Value Date    CREATININE 0.77 02/23/2024    CREATININE 0.72 02/22/2024    CREATININE 0.75 02/21/2024    CREATININE 0.88 02/20/2024        Patient weight is No results found for: \"PTWEIGHT\"    No results found for: \"VANCORANDOM\", \"CULTURE\"     No intake/output data recorded.    Lab Results   Component Value Date    PATIENTTEMP 37.0 02/19/2024          Assessment/Plan    Patient has already been given a loading dose of 2000 mg at [facility] (confirmed via phone pharmacy to pharmacy)  Will initiate vancomycin maintenance,  1250 mg every 12 hours.    This dosing regimen is predicted by [employer] to result in the following pharmacokinetic parameters:    Loading dose: N/A  Regimen: 1250 mg IV every 12 hours.  Start time: 03:00 on 03/02/2024  Exposure target: AUC24 (range)400-600 mg/L.hr   AUC24,ss: 461 mg/L.hr  Probability of AUC24 > 400: 65 %  Ctrough,ss: 14.2 mg/L  Probability of Ctrough,ss > 20: 24 %  Probability of nephrotoxicity (Lodise PREETHI 2009): 9 %      Follow-up level will be ordered on 3/2 at AM labs unless clinically indicated sooner.  Will continue to monitor renal function daily while on vancomycin and order serum creatinine at least every 48 hours if not already ordered.  Follow for continued vancomycin needs, clinical response, and signs/symptoms of toxicity.     Thank you for allowing me to participate in the care of this patient.     Gricel Figueroa, PharmD "

## 2024-03-02 NOTE — SIGNIFICANT EVENT
Wound Care    Patient's wound draining at central aspect. Staples removed. Middle aspect of wound open ~3cm and and draining murky, serosanguinous fluid. Tracks another 3 cm medially. Wound irrigated with saline. Packed with 1 inch gauze and covered with ABD.    Gricel Ibanez, PGY2  General Surgery

## 2024-03-02 NOTE — CARE PLAN
The patient's goals for the shift include Rest and iv atb    The clinical goals for the shift include control pain    Problem: Pain  Goal: My pain/discomfort is manageable  Outcome: Progressing     Problem: Safety  Goal: Patient will be injury free during hospitalization  Outcome: Progressing  Goal: I will remain free of falls  Outcome: Progressing     Problem: Daily Care  Goal: Daily care needs are met  Outcome: Progressing     Problem: Psychosocial Needs  Goal: Demonstrates ability to cope with hospitalization/illness  Outcome: Progressing  Goal: Collaborate with me, my family, and caregiver to identify my specific goals  Outcome: Progressing     Problem: Discharge Barriers  Goal: My discharge needs are met  Outcome: Progressing

## 2024-03-02 NOTE — PROGRESS NOTES
"[facility]  ACUTE CARE SURGERY - PROGRESS NOTE    Patient Name: Dk Alas  MRN: [id number]  Admit Date: 301  : 1980  AGE: 43 y.o.   GENDER: male  ==============================================================================  TODAY'S ASSESSMENT AND PLAN OF CARE:  43 year old male with a history of CBD stenosis (unclear etiology) s/p ERCP with stent and subsequent removal, post-stent removal cholecystitis s/p open partial, reconstituted cholecystectomy on 24 with Dr. Leroy who presents as a transfer from an OSH due to drainage from his wound and concern for abscess seen on imaging with a leukocytosis of 17.0.       - Pain control with tylenol, PO oxy  - Continue IV abx  - Daily packing changes of wound  - Ok for diet  - IR for abscess drainage on 3/4  - Trend leukocytosis  - Home meds    Seen and discussed with Attending Dr. Rad Gonzalez PGY-5  ACS [id number]    ==============================================================================  CHIEF COMPLAINT / EVENTS LAST 24HRS / HPI:  No acute events overnight, packing changed. Pain controlled. NPO for possible IR    MEDICAL HISTORY / ROS:   Admission history and ROS reviewed. Pertinent changes as follows:      PHYSICAL EXAM:  Heart Rate:  [62-78]   Temp:  [36.4 °C (97.5 °F)-37 °C (98.6 °F)]   Resp:  [16-18]   BP: (100-138)/(58-95)   Height:  [177.8 cm (5' 10\")]   Weight:  [96.1 kg (211 lb 14.4 oz)-104 kg (230 lb)]   SpO2:  [92 %-100 %]   Physical Exam    GEN: lying in bed, NAD   HEAD: atraumatic   RESP: breathing comfortably on room air   CV: well perfused, regular rate, no tachycardia  ABD: soft, localized tenderness to the RUQ, RUQ incision and packed with nugauze, improving erythema, nondistended  : no doyle   NEURO: no focal deficits appreciated   PSYCH: appropriate     IMAGING SUMMARY:  (summary of new imaging findings, not a copy of dictation)      LABS:  Results from last 7 days   Lab Units " 03/02/24 0513 03/01/24 2051   WBC AUTO x10*3/uL 11.3 14.7*   HEMOGLOBIN g/dL 9.8* 10.9*   HEMATOCRIT % 30.4* 34.1*   PLATELETS AUTO x10*3/uL 479* 515*   NEUTROS PCT AUTO % 66.2  --    LYMPHS PCT AUTO % 24.4  --    MONOS PCT AUTO % 6.6  --    EOS PCT AUTO % 1.8  --          Results from last 7 days   Lab Units 03/02/24 0513 03/01/24 2051   SODIUM mmol/L 136 136   POTASSIUM mmol/L 4.0 3.8   CHLORIDE mmol/L 99 100   CO2 mmol/L 28 24   BUN mg/dL 9 10   CREATININE mg/dL 0.94 0.88   CALCIUM mg/dL 8.9 9.3   PROTEIN TOTAL g/dL 5.8* 7.0   BILIRUBIN TOTAL mg/dL 1.0 1.0   ALK PHOS U/L 135* 155*   ALT U/L 26 30   AST U/L 13 15   GLUCOSE mg/dL 106* 107*     Results from last 7 days   Lab Units 03/02/24 0513 03/01/24 2051   BILIRUBIN TOTAL mg/dL 1.0 1.0   BILIRUBIN DIRECT mg/dL 0.2 0.2           I have reviewed all medications, laboratory results, and imaging pertinent for today's encounter.

## 2024-03-02 NOTE — CARE PLAN
The patient's goals for the shift include Rest and iv atb    The clinical goals for the shift include Pain Management

## 2024-03-02 NOTE — PROGRESS NOTES
"Vancomycin Dosing by Pharmacy- FOLLOW UP    Dk Alas is a 43 y.o. year old male who Pharmacy has been consulted for vancomycin dosing for other abdominal infection . Based on the patient's indication and renal status this patient is being dosed based on a goal AUC of 400-600.     Renal function is currently stable. Scr sl inc 0.88 to 0.94, monitor.     Current vancomycin dose: 1250 mg given every 12 hours    Estimated vancomycin AUC on current dose: 419 mg/L.hr     Visit Vitals  /66 (BP Location: Left arm, Patient Position: Lying)   Pulse 64   Temp 36.7 °C (98.1 °F) (Temporal)   Resp 18        Lab Results   Component Value Date    CREATININE 0.94 03/02/2024    CREATININE 0.88 03/01/2024    CREATININE 0.77 02/23/2024    CREATININE 0.72 02/22/2024        Patient weight is No results found for: \"PTWEIGHT\"    No results found for: \"CULTURE\"     I/O last 3 completed shifts:  In: 780 (8.1 mL/kg) [P.O.:30; I.V.:750 (7.8 mL/kg)]  Out: - (0 mL/kg)   Weight: 96.1 kg   [unfilled]    Lab Results   Component Value Date    PATIENTTEMP 37.0 02/19/2024        Assessment/Plan    Within goal AUC range. Continue current vancomycin regimen.    This dosing regimen is predicted by [employer] to result in the following pharmacokinetic parameters:  <<<<<PASTE [employer] DATA HERE>>>>>  Loading dose: N/A  Regimen: 1250 mg IV every 12 hours.  Start time: 14:00 on 03/02/2024  Exposure target: AUC24 (range)400-600 mg/L.hr   AUC24,ss: 419 mg/L.hr  Probability of AUC24 > 400: 59 %  Ctrough,ss: 12.4 mg/L  Probability of Ctrough,ss > 20: 6 %  Probability of nephrotoxicity (Lodise PREETHI 2009): 8 %    The next level will be obtained on 3/5 at 0500. May be obtained sooner if clinically indicated.   Will continue to monitor renal function daily while on vancomycin and order serum creatinine at least every 48 hours if not already ordered.  Follow for continued vancomycin needs, clinical response, and signs/symptoms of toxicity.       Dionne DAWKINS" Minna, DainD

## 2024-03-03 LAB
ALBUMIN SERPL BCP-MCNC: 3 G/DL (ref 3.4–5)
ANION GAP SERPL CALC-SCNC: 13 MMOL/L (ref 10–20)
BUN SERPL-MCNC: 8 MG/DL (ref 6–23)
CALCIUM SERPL-MCNC: 8.6 MG/DL (ref 8.6–10.6)
CHLORIDE SERPL-SCNC: 102 MMOL/L (ref 98–107)
CO2 SERPL-SCNC: 27 MMOL/L (ref 21–32)
CREAT SERPL-MCNC: 0.92 MG/DL (ref 0.5–1.3)
EGFRCR SERPLBLD CKD-EPI 2021: >90 ML/MIN/1.73M*2
ERYTHROCYTE [DISTWIDTH] IN BLOOD BY AUTOMATED COUNT: 12.6 % (ref 11.5–14.5)
GLUCOSE BLD MANUAL STRIP-MCNC: 115 MG/DL (ref 74–99)
GLUCOSE BLD MANUAL STRIP-MCNC: 116 MG/DL (ref 74–99)
GLUCOSE BLD MANUAL STRIP-MCNC: 153 MG/DL (ref 74–99)
GLUCOSE SERPL-MCNC: 119 MG/DL (ref 74–99)
HCT VFR BLD AUTO: 28.6 % (ref 41–52)
HGB BLD-MCNC: 9.2 G/DL (ref 13.5–17.5)
MAGNESIUM SERPL-MCNC: 2.01 MG/DL (ref 1.6–2.4)
MCH RBC QN AUTO: 26.9 PG (ref 26–34)
MCHC RBC AUTO-ENTMCNC: 32.2 G/DL (ref 32–36)
MCV RBC AUTO: 84 FL (ref 80–100)
NRBC BLD-RTO: 0 /100 WBCS (ref 0–0)
PHOSPHATE SERPL-MCNC: 4.1 MG/DL (ref 2.5–4.9)
PLATELET # BLD AUTO: 447 X10*3/UL (ref 150–450)
POTASSIUM SERPL-SCNC: 3.5 MMOL/L (ref 3.5–5.3)
RBC # BLD AUTO: 3.42 X10*6/UL (ref 4.5–5.9)
SODIUM SERPL-SCNC: 138 MMOL/L (ref 136–145)
WBC # BLD AUTO: 8.1 X10*3/UL (ref 4.4–11.3)

## 2024-03-03 PROCEDURE — 36415 COLL VENOUS BLD VENIPUNCTURE: CPT | Performed by: PODIATRIST

## 2024-03-03 PROCEDURE — 82947 ASSAY GLUCOSE BLOOD QUANT: CPT

## 2024-03-03 PROCEDURE — 99024 POSTOP FOLLOW-UP VISIT: CPT | Performed by: SURGERY

## 2024-03-03 PROCEDURE — 2500000001 HC RX 250 WO HCPCS SELF ADMINISTERED DRUGS (ALT 637 FOR MEDICARE OP)

## 2024-03-03 PROCEDURE — 2500000001 HC RX 250 WO HCPCS SELF ADMINISTERED DRUGS (ALT 637 FOR MEDICARE OP): Performed by: PODIATRIST

## 2024-03-03 PROCEDURE — 99231 SBSQ HOSP IP/OBS SF/LOW 25: CPT | Performed by: SURGERY

## 2024-03-03 PROCEDURE — 80069 RENAL FUNCTION PANEL: CPT | Performed by: PODIATRIST

## 2024-03-03 PROCEDURE — 1100000001 HC PRIVATE ROOM DAILY

## 2024-03-03 PROCEDURE — 83735 ASSAY OF MAGNESIUM: CPT | Performed by: PODIATRIST

## 2024-03-03 PROCEDURE — 85027 COMPLETE CBC AUTOMATED: CPT | Performed by: PODIATRIST

## 2024-03-03 PROCEDURE — 2500000004 HC RX 250 GENERAL PHARMACY W/ HCPCS (ALT 636 FOR OP/ED): Performed by: PODIATRIST

## 2024-03-03 PROCEDURE — 2500000002 HC RX 250 W HCPCS SELF ADMINISTERED DRUGS (ALT 637 FOR MEDICARE OP, ALT 636 FOR OP/ED): Performed by: PODIATRIST

## 2024-03-03 PROCEDURE — 2500000004 HC RX 250 GENERAL PHARMACY W/ HCPCS (ALT 636 FOR OP/ED)

## 2024-03-03 RX ORDER — POTASSIUM CHLORIDE 20 MEQ/1
40 TABLET, EXTENDED RELEASE ORAL ONCE
Status: COMPLETED | OUTPATIENT
Start: 2024-03-03 | End: 2024-03-03

## 2024-03-03 RX ORDER — DEXTROSE MONOHYDRATE AND SODIUM CHLORIDE 5; .45 G/100ML; G/100ML
50 INJECTION, SOLUTION INTRAVENOUS CONTINUOUS
Status: DISCONTINUED | OUTPATIENT
Start: 2024-03-03 | End: 2024-03-06

## 2024-03-03 RX ORDER — CALCIUM CARBONATE 200(500)MG
500 TABLET,CHEWABLE ORAL DAILY
Status: DISCONTINUED | OUTPATIENT
Start: 2024-03-03 | End: 2024-03-07 | Stop reason: HOSPADM

## 2024-03-03 RX ADMIN — SODIUM CHLORIDE, POTASSIUM CHLORIDE, SODIUM LACTATE AND CALCIUM CHLORIDE 100 ML/HR: 600; 310; 30; 20 INJECTION, SOLUTION INTRAVENOUS at 09:32

## 2024-03-03 RX ADMIN — OXYCODONE HYDROCHLORIDE 10 MG: 5 TABLET ORAL at 11:08

## 2024-03-03 RX ADMIN — Medication 250 MCG: at 09:29

## 2024-03-03 RX ADMIN — ENOXAPARIN SODIUM 40 MG: 100 INJECTION SUBCUTANEOUS at 22:47

## 2024-03-03 RX ADMIN — CALCIUM CARBONATE (ANTACID) CHEW TAB 500 MG 500 MG: 500 CHEW TAB at 11:08

## 2024-03-03 RX ADMIN — ACETAMINOPHEN 650 MG: 325 TABLET ORAL at 05:04

## 2024-03-03 RX ADMIN — PIPERACILLIN SODIUM AND TAZOBACTAM SODIUM 3.38 G: 3; .375 INJECTION, SOLUTION INTRAVENOUS at 12:14

## 2024-03-03 RX ADMIN — ACETAMINOPHEN 650 MG: 325 TABLET ORAL at 22:47

## 2024-03-03 RX ADMIN — DEXTROSE MONOHYDRATE 1.25 G: 50 INJECTION, SOLUTION INTRAVENOUS at 14:19

## 2024-03-03 RX ADMIN — DEXTROSE MONOHYDRATE 1.25 G: 50 INJECTION, SOLUTION INTRAVENOUS at 03:04

## 2024-03-03 RX ADMIN — BUPROPION HYDROCHLORIDE 300 MG: 150 TABLET, EXTENDED RELEASE ORAL at 09:29

## 2024-03-03 RX ADMIN — PIPERACILLIN SODIUM AND TAZOBACTAM SODIUM 3.38 G: 3; .375 INJECTION, SOLUTION INTRAVENOUS at 06:11

## 2024-03-03 RX ADMIN — POTASSIUM CHLORIDE 40 MEQ: 1500 TABLET, EXTENDED RELEASE ORAL at 12:14

## 2024-03-03 RX ADMIN — LEVOTHYROXINE SODIUM 200 MCG: 0.2 TABLET ORAL at 06:11

## 2024-03-03 RX ADMIN — PIPERACILLIN SODIUM AND TAZOBACTAM SODIUM 3.38 G: 3; .375 INJECTION, SOLUTION INTRAVENOUS at 01:06

## 2024-03-03 RX ADMIN — ACETAMINOPHEN 650 MG: 325 TABLET ORAL at 09:29

## 2024-03-03 RX ADMIN — OXYCODONE HYDROCHLORIDE 10 MG: 5 TABLET ORAL at 06:52

## 2024-03-03 RX ADMIN — ACETAMINOPHEN 650 MG: 325 TABLET ORAL at 16:40

## 2024-03-03 RX ADMIN — ATORVASTATIN CALCIUM 20 MG: 20 TABLET, FILM COATED ORAL at 09:29

## 2024-03-03 RX ADMIN — PIPERACILLIN SODIUM AND TAZOBACTAM SODIUM 3.38 G: 3; .375 INJECTION, SOLUTION INTRAVENOUS at 18:05

## 2024-03-03 RX ADMIN — OXYCODONE HYDROCHLORIDE 10 MG: 5 TABLET ORAL at 19:39

## 2024-03-03 RX ADMIN — DEXTROSE AND SODIUM CHLORIDE 50 ML/HR: 5; 450 INJECTION, SOLUTION INTRAVENOUS at 15:34

## 2024-03-03 ASSESSMENT — COGNITIVE AND FUNCTIONAL STATUS - GENERAL
MOBILITY SCORE: 24
MOBILITY SCORE: 24
DAILY ACTIVITIY SCORE: 24
DAILY ACTIVITIY SCORE: 24

## 2024-03-03 ASSESSMENT — PAIN DESCRIPTION - LOCATION
LOCATION: ABDOMEN

## 2024-03-03 ASSESSMENT — PAIN - FUNCTIONAL ASSESSMENT
PAIN_FUNCTIONAL_ASSESSMENT: 0-10

## 2024-03-03 ASSESSMENT — PAIN SCALES - GENERAL
PAINLEVEL_OUTOF10: 5 - MODERATE PAIN
PAINLEVEL_OUTOF10: 7
PAINLEVEL_OUTOF10: 3
PAINLEVEL_OUTOF10: 3
PAINLEVEL_OUTOF10: 2
PAINLEVEL_OUTOF10: 3
PAINLEVEL_OUTOF10: 7
PAINLEVEL_OUTOF10: 2
PAINLEVEL_OUTOF10: 7

## 2024-03-03 ASSESSMENT — PAIN DESCRIPTION - ORIENTATION
ORIENTATION: RIGHT

## 2024-03-03 ASSESSMENT — PAIN SCALES - PAIN ASSESSMENT IN ADVANCED DEMENTIA (PAINAD)
BREATHING: NORMAL

## 2024-03-03 ASSESSMENT — PAIN DESCRIPTION - DESCRIPTORS
DESCRIPTORS: ACHING;SORE
DESCRIPTORS: ACHING;SORE

## 2024-03-03 NOTE — PROGRESS NOTES
[facility]  ACUTE CARE SURGERY - PROGRESS NOTE    Patient Name: Dk Alas  MRN: [id number]  Admit Date: 301  : 1980  AGE: 43 y.o.   GENDER: male  ==============================================================================  TODAY'S ASSESSMENT AND PLAN OF CARE:  43 year old male with a history of CBD stenosis (unclear etiology) s/p ERCP with stent and subsequent removal, post-stent removal cholecystitis s/p open partial, reconstituted cholecystectomy on 24 with Dr. Leroy who presents as a transfer from an OSH due to drainage from his wound and concern for abscess seen on imaging with a leukocytosis of 17.0.  Plan for IR drainage on Monday 3/4.      - Pain control with tylenol, PO oxy  - Continue IV abx Vanc and Zosyn  - Daily packing changes of wound  - Regular diet. NPO at midnight for IR drainage tomorrow 3/4  - Continue D5 1/2 NS at 50 mL/hr   - Blood cultures 3/1: Positive for Staph saprophyticus, Staph hominis. Will repeat blood cultures in 1-2 days  - Trend leukocytosis. WBC tending down. 8.1 today   - Home meds     Seen and discussed with Attending Dr. Rad Hutchison MD   PGY1, Family Medicine   Acute Care Surgery Service   Pager [phone]   ==============================================================================  CHIEF COMPLAINT / EVENTS LAST 24HRS / HPI:  No acute events overnight. He feels good. He is eating and drink ok. Denies nausea and vomiting.     MEDICAL HISTORY / ROS:   Admission history and ROS reviewed.     PHYSICAL EXAM:  Heart Rate:  [62-69]   Temp:  [35.1 °C (95.2 °F)-36.6 °C (97.9 °F)]   Resp:  [17-18]   BP: (100-127)/(58-79)   SpO2:  [94 %-98 %]   Physical Exam    GEN: NAD ; resting comfortably in bed  HEAD: atraumatic   RESP: breathing comfortably on room air   CV: well perfused, regular rate, no tachycardia  ABD: soft, localized tenderness to the RUQ, RUQ incision and packed with nugauze, improving erythema, nondistended  : no  yanira   NEURO: no focal deficits appreciated   PSYCH: appropriate     LABS:  Results for orders placed or performed during the hospital encounter of 03/01/24 (from the past 24 hour(s))   POCT GLUCOSE   Result Value Ref Range    POCT Glucose 99 74 - 99 mg/dL   POCT GLUCOSE   Result Value Ref Range    POCT Glucose 119 (H) 74 - 99 mg/dL   POCT GLUCOSE   Result Value Ref Range    POCT Glucose 153 (H) 74 - 99 mg/dL   CBC   Result Value Ref Range    WBC 8.1 4.4 - 11.3 x10*3/uL    nRBC 0.0 0.0 - 0.0 /100 WBCs    RBC 3.42 (L) 4.50 - 5.90 x10*6/uL    Hemoglobin 9.2 (L) 13.5 - 17.5 g/dL    Hematocrit 28.6 (L) 41.0 - 52.0 %    MCV 84 80 - 100 fL    MCH 26.9 26.0 - 34.0 pg    MCHC 32.2 32.0 - 36.0 g/dL    RDW 12.6 11.5 - 14.5 %    Platelets 447 150 - 450 x10*3/uL   Renal function panel   Result Value Ref Range    Glucose 119 (H) 74 - 99 mg/dL    Sodium 138 136 - 145 mmol/L    Potassium 3.5 3.5 - 5.3 mmol/L    Chloride 102 98 - 107 mmol/L    Bicarbonate 27 21 - 32 mmol/L    Anion Gap 13 10 - 20 mmol/L    Urea Nitrogen 8 6 - 23 mg/dL    Creatinine 0.92 0.50 - 1.30 mg/dL    eGFR >90 >60 mL/min/1.73m*2    Calcium 8.6 8.6 - 10.6 mg/dL    Phosphorus 4.1 2.5 - 4.9 mg/dL    Albumin 3.0 (L) 3.4 - 5.0 g/dL   Magnesium   Result Value Ref Range    Magnesium 2.01 1.60 - 2.40 mg/dL     MEDICATIONS:  Current Facility-Administered Medications   Medication Dose Route Frequency Provider Last Rate Last Admin    acetaminophen (Tylenol) tablet 650 mg  650 mg oral q6h Gricel Ibanez MD   650 mg at 03/03/24 0929    atorvastatin (Lipitor) tablet 20 mg  20 mg oral Daily Gricel Ibanez MD   20 mg at 03/03/24 0929    buPROPion XL (Wellbutrin XL) 24 hr tablet 300 mg  300 mg oral Daily Gricel Ibanez MD   300 mg at 03/03/24 0929    calcium carbonate (Tums) chewable tablet 500 mg  500 mg oral Daily Juliann Hutchison MD   500 mg at 03/03/24 1108    cyanocobalamin (Vitamin B-12) tablet 250 mcg  250 mcg oral Daily Gricel Ibanez MD   250 mcg at 03/03/24 0929     dextrose 10 % in water (D10W) infusion  0.3 g/kg/hr intravenous Once PRN Gricel Ibanez MD        dextrose 50 % injection 25 g  25 g intravenous q15 min PRN Gricel Ibanez MD        enoxaparin (Lovenox) syringe 40 mg  40 mg subcutaneous q24h Gricel Ibanez MD   40 mg at 03/02/24 2256    glucagon (Glucagen) injection 1 mg  1 mg intramuscular q15 min PRN Gricel Ibanez MD        insulin regular (HumuLIN R,NovoLIN R) injection 0-5 Units  0-5 Units subcutaneous With meals & nightly Juliann Hutchison MD        lactated Ringer's infusion  100 mL/hr intravenous Continuous Gricel Ibanez  mL/hr at 03/03/24 0932 100 mL/hr at 03/03/24 0932    levothyroxine (Synthroid, Levoxyl) tablet 200 mcg  200 mcg oral Daily before breakfast Gricel Ibanez MD   200 mcg at 03/03/24 0611    naloxone (Narcan) injection 0.2 mg  0.2 mg intravenous q5 min PRN Gricel Ibanez MD        oxyCODONE (Roxicodone) immediate release tablet 10 mg  10 mg oral q4h PRN Gricel Ibanez MD   10 mg at 03/03/24 1108    oxyCODONE (Roxicodone) immediate release tablet 5 mg  5 mg oral q4h PRN Gricel Ibanez MD        piperacillin-tazobactam-dextrose (Zosyn) IV 3.375 g  3.375 g intravenous q6h Gricel Ibanez MD   Stopped at 03/03/24 1244    vancomycin (Vancocin) 1.25 g in dextrose 5 % in water (D5W) 250 mL IV  1,250 mg intravenous q12h Gricel Ibanez  mL/hr at 03/03/24 1419 1.25 g at 03/03/24 1419       IMAGING SUMMARY:  (summary of new imaging findings, not a copy of dictation)      I have reviewed all laboratory and imaging results ordered/pertinent for today's encounter.

## 2024-03-03 NOTE — CARE PLAN
The patient's goals for the shift include Rest and iv atb    The clinical goals for the shift include control pain    Over the shift, the patient did not make progress toward the following goals. Barriers to progression include none. Recommendations to address these barriers include N/A.

## 2024-03-03 NOTE — PROGRESS NOTES
Discharge Planning Note:      Dk Alas is a 43 y.o. male on day 2 of admission presenting with Abscess of abdominal wall.        Spoke with the patient via telephone confirmed all information on the demographics page. His brother lives with him for support in his single family home. NO steps to enter or to another level has 2 dogs and a cat. [facility] for homecare if needed.              Katie Oliver RN TCC

## 2024-03-04 LAB
ALBUMIN SERPL BCP-MCNC: 3.2 G/DL (ref 3.4–5)
ANION GAP SERPL CALC-SCNC: 12 MMOL/L (ref 10–20)
BUN SERPL-MCNC: 6 MG/DL (ref 6–23)
CALCIUM SERPL-MCNC: 9.1 MG/DL (ref 8.6–10.6)
CHLORIDE SERPL-SCNC: 103 MMOL/L (ref 98–107)
CO2 SERPL-SCNC: 29 MMOL/L (ref 21–32)
CREAT SERPL-MCNC: 1.1 MG/DL (ref 0.5–1.3)
EGFRCR SERPLBLD CKD-EPI 2021: 84 ML/MIN/1.73M*2
ERYTHROCYTE [DISTWIDTH] IN BLOOD BY AUTOMATED COUNT: 12.4 % (ref 11.5–14.5)
GLUCOSE BLD MANUAL STRIP-MCNC: 106 MG/DL (ref 74–99)
GLUCOSE BLD MANUAL STRIP-MCNC: 141 MG/DL (ref 74–99)
GLUCOSE SERPL-MCNC: 108 MG/DL (ref 74–99)
HCT VFR BLD AUTO: 31 % (ref 41–52)
HGB BLD-MCNC: 9.4 G/DL (ref 13.5–17.5)
MAGNESIUM SERPL-MCNC: 2.09 MG/DL (ref 1.6–2.4)
MCH RBC QN AUTO: 25.5 PG (ref 26–34)
MCHC RBC AUTO-ENTMCNC: 30.3 G/DL (ref 32–36)
MCV RBC AUTO: 84 FL (ref 80–100)
NRBC BLD-RTO: 0 /100 WBCS (ref 0–0)
PHOSPHATE SERPL-MCNC: 4.2 MG/DL (ref 2.5–4.9)
PLATELET # BLD AUTO: 516 X10*3/UL (ref 150–450)
POTASSIUM SERPL-SCNC: 3.8 MMOL/L (ref 3.5–5.3)
RBC # BLD AUTO: 3.69 X10*6/UL (ref 4.5–5.9)
SODIUM SERPL-SCNC: 140 MMOL/L (ref 136–145)
WBC # BLD AUTO: 8 X10*3/UL (ref 4.4–11.3)

## 2024-03-04 PROCEDURE — 0F9430Z DRAINAGE OF GALLBLADDER WITH DRAINAGE DEVICE, PERCUTANEOUS APPROACH: ICD-10-PCS | Performed by: RADIOLOGY

## 2024-03-04 PROCEDURE — 2500000001 HC RX 250 WO HCPCS SELF ADMINISTERED DRUGS (ALT 637 FOR MEDICARE OP)

## 2024-03-04 PROCEDURE — 80069 RENAL FUNCTION PANEL: CPT | Performed by: PODIATRIST

## 2024-03-04 PROCEDURE — 2500000004 HC RX 250 GENERAL PHARMACY W/ HCPCS (ALT 636 FOR OP/ED)

## 2024-03-04 PROCEDURE — 82947 ASSAY GLUCOSE BLOOD QUANT: CPT

## 2024-03-04 PROCEDURE — 85027 COMPLETE CBC AUTOMATED: CPT | Performed by: PODIATRIST

## 2024-03-04 PROCEDURE — 36415 COLL VENOUS BLD VENIPUNCTURE: CPT | Performed by: PODIATRIST

## 2024-03-04 PROCEDURE — 1100000001 HC PRIVATE ROOM DAILY

## 2024-03-04 PROCEDURE — 83735 ASSAY OF MAGNESIUM: CPT | Performed by: PODIATRIST

## 2024-03-04 RX ADMIN — Medication 250 MCG: at 08:50

## 2024-03-04 RX ADMIN — OXYCODONE HYDROCHLORIDE 10 MG: 5 TABLET ORAL at 03:29

## 2024-03-04 RX ADMIN — ENOXAPARIN SODIUM 40 MG: 100 INJECTION SUBCUTANEOUS at 21:15

## 2024-03-04 RX ADMIN — PIPERACILLIN SODIUM AND TAZOBACTAM SODIUM 3.38 G: 3; .375 INJECTION, SOLUTION INTRAVENOUS at 00:25

## 2024-03-04 RX ADMIN — OXYCODONE HYDROCHLORIDE 10 MG: 5 TABLET ORAL at 11:09

## 2024-03-04 RX ADMIN — PIPERACILLIN SODIUM AND TAZOBACTAM SODIUM 3.38 G: 3; .375 INJECTION, SOLUTION INTRAVENOUS at 06:57

## 2024-03-04 RX ADMIN — ATORVASTATIN CALCIUM 20 MG: 20 TABLET, FILM COATED ORAL at 08:50

## 2024-03-04 RX ADMIN — ACETAMINOPHEN 650 MG: 325 TABLET ORAL at 03:21

## 2024-03-04 RX ADMIN — DEXTROSE MONOHYDRATE 1.25 G: 50 INJECTION, SOLUTION INTRAVENOUS at 14:59

## 2024-03-04 RX ADMIN — PIPERACILLIN SODIUM AND TAZOBACTAM SODIUM 3.38 G: 3; .375 INJECTION, SOLUTION INTRAVENOUS at 18:12

## 2024-03-04 RX ADMIN — OXYCODONE HYDROCHLORIDE 5 MG: 5 TABLET ORAL at 21:15

## 2024-03-04 RX ADMIN — DEXTROSE MONOHYDRATE 1.25 G: 50 INJECTION, SOLUTION INTRAVENOUS at 03:21

## 2024-03-04 RX ADMIN — ACETAMINOPHEN 650 MG: 325 TABLET ORAL at 21:15

## 2024-03-04 RX ADMIN — PIPERACILLIN SODIUM AND TAZOBACTAM SODIUM 3.38 G: 3; .375 INJECTION, SOLUTION INTRAVENOUS at 11:34

## 2024-03-04 RX ADMIN — BUPROPION HYDROCHLORIDE 300 MG: 150 TABLET, EXTENDED RELEASE ORAL at 08:50

## 2024-03-04 RX ADMIN — ACETAMINOPHEN 650 MG: 325 TABLET ORAL at 10:22

## 2024-03-04 ASSESSMENT — PAIN SCALES - GENERAL
PAINLEVEL_OUTOF10: 3
PAINLEVEL_OUTOF10: 7
PAINLEVEL_OUTOF10: 4
PAINLEVEL_OUTOF10: 7
PAINLEVEL_OUTOF10: 7
PAINLEVEL_OUTOF10: 6
PAINLEVEL_OUTOF10: 3

## 2024-03-04 ASSESSMENT — COGNITIVE AND FUNCTIONAL STATUS - GENERAL
MOBILITY SCORE: 24
DAILY ACTIVITIY SCORE: 24

## 2024-03-04 ASSESSMENT — PAIN DESCRIPTION - ORIENTATION: ORIENTATION: RIGHT

## 2024-03-04 ASSESSMENT — PAIN DESCRIPTION - LOCATION
LOCATION: ABDOMEN

## 2024-03-04 ASSESSMENT — PAIN - FUNCTIONAL ASSESSMENT
PAIN_FUNCTIONAL_ASSESSMENT: 0-10

## 2024-03-04 NOTE — PROGRESS NOTES
[facility]  ACUTE CARE SURGERY - PROGRESS NOTE    Patient Name: Dk Alas  MRN: [id number]  Admit Date: 301  : 1980  AGE: 43 y.o.   GENDER: male  ==============================================================================  TODAY'S ASSESSMENT AND PLAN OF CARE:  43 year old male with a history of CBD stenosis (unclear etiology) s/p ERCP with stent and subsequent removal, post-stent removal cholecystitis s/p open partial, reconstituted cholecystectomy on 24 with Dr. Leroy who presents as a transfer from an OSH due to drainage from his wound and concern for abscess seen on imaging with a leukocytosis of 17.0, now nml. Pending IR drainage 3/.      - Pain control with tylenol, PO oxy  - Continue IV abx Vanc and Zosyn  - Daily packing changes of wound  - Regular diet. Currently NPO for IR drainage 3/4  - Continue D5 1/2 NS at 50 mL/hr   - Bactermia 3/1: Staph saprophyticus, Staph hominis. Will repeat Bcx 3/5   - WBC nml @ 8  - Continue home meds     Seen and discussed with Attending Dr. Kurt Mora MD   PGY1, Family Medicine   Acute Care Surgery Service   Pager [phone]   ==============================================================================  CHIEF COMPLAINT / EVENTS LAST 24HRS / HPI:  No acute events overnight. Denies any concerns, is tolerating PO intake. Denies nausea and vomiting.     MEDICAL HISTORY / ROS:   Admission history and ROS reviewed.     PHYSICAL EXAM:  Heart Rate:  [60-71]   Temp:  [35.8 °C (96.4 °F)-37 °C (98.6 °F)]   Resp:  [17-18]   BP: (118-128)/(60-79)   SpO2:  [92 %-97 %]   Physical Exam    GEN: NAD ; resting comfortably in bed  HEAD: atraumatic   RESP: breathing comfortably on room air   CV: well perfused, regular rate, no tachycardia  ABD: soft, localized tenderness to the RUQ, RUQ incision and packed with nugauze, improving erythema, nondistended  : no doyle   NEURO: no focal deficits appreciated   PSYCH:  appropriate     LABS:  Results for orders placed or performed during the hospital encounter of 03/01/24 (from the past 24 hour(s))   POCT GLUCOSE   Result Value Ref Range    POCT Glucose 115 (H) 74 - 99 mg/dL   POCT GLUCOSE   Result Value Ref Range    POCT Glucose 116 (H) 74 - 99 mg/dL   CBC   Result Value Ref Range    WBC 8.0 4.4 - 11.3 x10*3/uL    nRBC 0.0 0.0 - 0.0 /100 WBCs    RBC 3.69 (L) 4.50 - 5.90 x10*6/uL    Hemoglobin 9.4 (L) 13.5 - 17.5 g/dL    Hematocrit 31.0 (L) 41.0 - 52.0 %    MCV 84 80 - 100 fL    MCH 25.5 (L) 26.0 - 34.0 pg    MCHC 30.3 (L) 32.0 - 36.0 g/dL    RDW 12.4 11.5 - 14.5 %    Platelets 516 (H) 150 - 450 x10*3/uL   Renal function panel   Result Value Ref Range    Glucose 108 (H) 74 - 99 mg/dL    Sodium 140 136 - 145 mmol/L    Potassium 3.8 3.5 - 5.3 mmol/L    Chloride 103 98 - 107 mmol/L    Bicarbonate 29 21 - 32 mmol/L    Anion Gap 12 10 - 20 mmol/L    Urea Nitrogen 6 6 - 23 mg/dL    Creatinine 1.10 0.50 - 1.30 mg/dL    eGFR 84 >60 mL/min/1.73m*2    Calcium 9.1 8.6 - 10.6 mg/dL    Phosphorus 4.2 2.5 - 4.9 mg/dL    Albumin 3.2 (L) 3.4 - 5.0 g/dL   Magnesium   Result Value Ref Range    Magnesium 2.09 1.60 - 2.40 mg/dL     MEDICATIONS:  Current Facility-Administered Medications   Medication Dose Route Frequency Provider Last Rate Last Admin    acetaminophen (Tylenol) tablet 650 mg  650 mg oral q6h Gricel Ibanez MD   650 mg at 03/04/24 0321    atorvastatin (Lipitor) tablet 20 mg  20 mg oral Daily Gricel Ibanez MD   20 mg at 03/03/24 0929    buPROPion XL (Wellbutrin XL) 24 hr tablet 300 mg  300 mg oral Daily Gricel Ibanez MD   300 mg at 03/03/24 0929    calcium carbonate (Tums) chewable tablet 500 mg  500 mg oral Daily Juliann Hutchison MD   500 mg at 03/03/24 1108    cyanocobalamin (Vitamin B-12) tablet 250 mcg  250 mcg oral Daily Gricel Ibanez MD   250 mcg at 03/03/24 0929    dextrose 10 % in water (D10W) infusion  0.3 g/kg/hr intravenous Once PRN Grciel Ibanez MD        dextrose 5%-0.45  % sodium chloride infusion  50 mL/hr intravenous Continuous Juliann Hutchison MD 50 mL/hr at 03/03/24 1534 50 mL/hr at 03/03/24 1534    dextrose 50 % injection 25 g  25 g intravenous q15 min PRN Gricel Ibanez MD        enoxaparin (Lovenox) syringe 40 mg  40 mg subcutaneous q24h Gricel Ibanez MD   40 mg at 03/03/24 2247    glucagon (Glucagen) injection 1 mg  1 mg intramuscular q15 min PRN Gricel Ibanez MD        insulin regular (HumuLIN R,NovoLIN R) injection 0-5 Units  0-5 Units subcutaneous With meals & nightly Juliann Hutchison MD        levothyroxine (Synthroid, Levoxyl) tablet 200 mcg  200 mcg oral Daily before breakfast Gricel Ibanez MD   200 mcg at 03/03/24 0611    naloxone (Narcan) injection 0.2 mg  0.2 mg intravenous q5 min PRN Gricel Ibanez MD        oxyCODONE (Roxicodone) immediate release tablet 10 mg  10 mg oral q4h PRN Gricel Ibanez MD   10 mg at 03/04/24 0329    oxyCODONE (Roxicodone) immediate release tablet 5 mg  5 mg oral q4h PRN Gricel Ibanez MD        piperacillin-tazobactam-dextrose (Zosyn) IV 3.375 g  3.375 g intravenous q6h Gricel Ibanez MD   Stopped at 03/04/24 0727    vancomycin (Vancocin) 1.25 g in dextrose 5 % in water (D5W) 250 mL IV  1,250 mg intravenous q12h Gricel Ibanez MD   Stopped at 03/04/24 0436       IMAGING SUMMARY:  (summary of new imaging findings, not a copy of dictation)      I have reviewed all laboratory and imaging results ordered/pertinent for today's encounter.

## 2024-03-04 NOTE — PROGRESS NOTES
Discharge Planning Note:      Dk Alas is a 43 y.o. male on day 3 of admission presenting with Abscess of abdominal wall.    Will need homecare or nursing blanket referrals sent for services pending acceptance of a company. Will have a drain on discharge requiring nurse services. ADOD pending for 3/6/24                Katie Oliver RN TCC

## 2024-03-05 ENCOUNTER — APPOINTMENT (OUTPATIENT)
Dept: RADIOLOGY | Facility: HOSPITAL | Age: 44
End: 2024-03-05
Payer: COMMERCIAL

## 2024-03-05 LAB
ALBUMIN SERPL BCP-MCNC: 3.4 G/DL (ref 3.4–5)
ANION GAP SERPL CALC-SCNC: 11 MMOL/L (ref 10–20)
BACTERIA BLD AEROBE CULT: ABNORMAL
BACTERIA BLD AEROBE CULT: ABNORMAL
BACTERIA BLD CULT: ABNORMAL
BACTERIA BLD CULT: ABNORMAL
BACTERIA BLD CULT: NORMAL
BUN SERPL-MCNC: 8 MG/DL (ref 6–23)
CALCIUM SERPL-MCNC: 8.9 MG/DL (ref 8.6–10.6)
CHLORIDE SERPL-SCNC: 104 MMOL/L (ref 98–107)
CO2 SERPL-SCNC: 29 MMOL/L (ref 21–32)
CREAT SERPL-MCNC: 1.3 MG/DL (ref 0.5–1.3)
EGFRCR SERPLBLD CKD-EPI 2021: 69 ML/MIN/1.73M*2
ERYTHROCYTE [DISTWIDTH] IN BLOOD BY AUTOMATED COUNT: 12.6 % (ref 11.5–14.5)
GLUCOSE BLD MANUAL STRIP-MCNC: 110 MG/DL (ref 74–99)
GLUCOSE BLD MANUAL STRIP-MCNC: 119 MG/DL (ref 74–99)
GLUCOSE BLD MANUAL STRIP-MCNC: 133 MG/DL (ref 74–99)
GLUCOSE BLD MANUAL STRIP-MCNC: 136 MG/DL (ref 74–99)
GLUCOSE BLD MANUAL STRIP-MCNC: 140 MG/DL (ref 74–99)
GLUCOSE BLD MANUAL STRIP-MCNC: 154 MG/DL (ref 74–99)
GLUCOSE SERPL-MCNC: 123 MG/DL (ref 74–99)
GRAM STN SPEC: ABNORMAL
HCT VFR BLD AUTO: 31.1 % (ref 41–52)
HGB BLD-MCNC: 9.6 G/DL (ref 13.5–17.5)
MAGNESIUM SERPL-MCNC: 2.22 MG/DL (ref 1.6–2.4)
MCH RBC QN AUTO: 25.7 PG (ref 26–34)
MCHC RBC AUTO-ENTMCNC: 30.9 G/DL (ref 32–36)
MCV RBC AUTO: 83 FL (ref 80–100)
NRBC BLD-RTO: 0 /100 WBCS (ref 0–0)
PHOSPHATE SERPL-MCNC: 4.2 MG/DL (ref 2.5–4.9)
PLATELET # BLD AUTO: 551 X10*3/UL (ref 150–450)
POTASSIUM SERPL-SCNC: 3.9 MMOL/L (ref 3.5–5.3)
RBC # BLD AUTO: 3.74 X10*6/UL (ref 4.5–5.9)
SODIUM SERPL-SCNC: 140 MMOL/L (ref 136–145)
VANCOMYCIN SERPL-MCNC: 24.9 UG/ML (ref 5–20)
WBC # BLD AUTO: 6.3 X10*3/UL (ref 4.4–11.3)

## 2024-03-05 PROCEDURE — 87040 BLOOD CULTURE FOR BACTERIA: CPT

## 2024-03-05 PROCEDURE — 36415 COLL VENOUS BLD VENIPUNCTURE: CPT | Performed by: PODIATRIST

## 2024-03-05 PROCEDURE — 36415 COLL VENOUS BLD VENIPUNCTURE: CPT

## 2024-03-05 PROCEDURE — C1769 GUIDE WIRE: HCPCS

## 2024-03-05 PROCEDURE — 2500000002 HC RX 250 W HCPCS SELF ADMINISTERED DRUGS (ALT 637 FOR MEDICARE OP, ALT 636 FOR OP/ED)

## 2024-03-05 PROCEDURE — 99152 MOD SED SAME PHYS/QHP 5/>YRS: CPT | Performed by: RADIOLOGY

## 2024-03-05 PROCEDURE — 87186 SC STD MICRODIL/AGAR DIL: CPT | Performed by: STUDENT IN AN ORGANIZED HEALTH CARE EDUCATION/TRAINING PROGRAM

## 2024-03-05 PROCEDURE — 85027 COMPLETE CBC AUTOMATED: CPT | Performed by: PODIATRIST

## 2024-03-05 PROCEDURE — 83735 ASSAY OF MAGNESIUM: CPT | Performed by: PODIATRIST

## 2024-03-05 PROCEDURE — 2720000007 HC OR 272 NO HCPCS

## 2024-03-05 PROCEDURE — 2500000002 HC RX 250 W HCPCS SELF ADMINISTERED DRUGS (ALT 637 FOR MEDICARE OP, ALT 636 FOR OP/ED): Performed by: PODIATRIST

## 2024-03-05 PROCEDURE — 2500000001 HC RX 250 WO HCPCS SELF ADMINISTERED DRUGS (ALT 637 FOR MEDICARE OP): Performed by: PODIATRIST

## 2024-03-05 PROCEDURE — 2500000004 HC RX 250 GENERAL PHARMACY W/ HCPCS (ALT 636 FOR OP/ED): Performed by: RADIOLOGY

## 2024-03-05 PROCEDURE — 49405 IMAGE CATH FLUID COLXN VISC: CPT

## 2024-03-05 PROCEDURE — 2500000004 HC RX 250 GENERAL PHARMACY W/ HCPCS (ALT 636 FOR OP/ED)

## 2024-03-05 PROCEDURE — 82947 ASSAY GLUCOSE BLOOD QUANT: CPT

## 2024-03-05 PROCEDURE — 2500000001 HC RX 250 WO HCPCS SELF ADMINISTERED DRUGS (ALT 637 FOR MEDICARE OP)

## 2024-03-05 PROCEDURE — 80202 ASSAY OF VANCOMYCIN: CPT

## 2024-03-05 PROCEDURE — 80069 RENAL FUNCTION PANEL: CPT | Performed by: PODIATRIST

## 2024-03-05 PROCEDURE — 1100000001 HC PRIVATE ROOM DAILY

## 2024-03-05 PROCEDURE — 47490 INCISION OF GALLBLADDER: CPT | Performed by: RADIOLOGY

## 2024-03-05 PROCEDURE — C1729 CATH, DRAINAGE: HCPCS

## 2024-03-05 RX ORDER — POTASSIUM CHLORIDE 20 MEQ/1
20 TABLET, EXTENDED RELEASE ORAL ONCE
Status: COMPLETED | OUTPATIENT
Start: 2024-03-05 | End: 2024-03-05

## 2024-03-05 RX ORDER — FENTANYL CITRATE 50 UG/ML
INJECTION, SOLUTION INTRAMUSCULAR; INTRAVENOUS
Status: COMPLETED | OUTPATIENT
Start: 2024-03-05 | End: 2024-03-05

## 2024-03-05 RX ORDER — VANCOMYCIN HYDROCHLORIDE 1 G/200ML
1000 INJECTION, SOLUTION INTRAVENOUS EVERY 12 HOURS
Status: DISCONTINUED | OUTPATIENT
Start: 2024-03-05 | End: 2024-03-06

## 2024-03-05 RX ORDER — POTASSIUM CHLORIDE 14.9 MG/ML
20 INJECTION INTRAVENOUS ONCE
Status: DISCONTINUED | OUTPATIENT
Start: 2024-03-05 | End: 2024-03-05

## 2024-03-05 RX ORDER — MIDAZOLAM HYDROCHLORIDE 1 MG/ML
INJECTION INTRAMUSCULAR; INTRAVENOUS
Status: COMPLETED | OUTPATIENT
Start: 2024-03-05 | End: 2024-03-05

## 2024-03-05 RX ADMIN — POTASSIUM CHLORIDE 20 MEQ: 1500 TABLET, EXTENDED RELEASE ORAL at 16:57

## 2024-03-05 RX ADMIN — BUPROPION HYDROCHLORIDE 300 MG: 150 TABLET, EXTENDED RELEASE ORAL at 09:13

## 2024-03-05 RX ADMIN — CALCIUM CARBONATE (ANTACID) CHEW TAB 500 MG 500 MG: 500 CHEW TAB at 09:13

## 2024-03-05 RX ADMIN — POTASSIUM CHLORIDE 20 MEQ: 14.9 INJECTION, SOLUTION INTRAVENOUS at 14:06

## 2024-03-05 RX ADMIN — ENOXAPARIN SODIUM 40 MG: 100 INJECTION SUBCUTANEOUS at 21:49

## 2024-03-05 RX ADMIN — FENTANYL CITRATE 100 MCG: 50 INJECTION, SOLUTION INTRAMUSCULAR; INTRAVENOUS at 12:35

## 2024-03-05 RX ADMIN — PIPERACILLIN SODIUM AND TAZOBACTAM SODIUM 3.38 G: 3; .375 INJECTION, SOLUTION INTRAVENOUS at 06:09

## 2024-03-05 RX ADMIN — ACETAMINOPHEN 650 MG: 325 TABLET ORAL at 09:14

## 2024-03-05 RX ADMIN — DEXTROSE MONOHYDRATE 1.25 G: 50 INJECTION, SOLUTION INTRAVENOUS at 03:02

## 2024-03-05 RX ADMIN — OXYCODONE HYDROCHLORIDE 10 MG: 5 TABLET ORAL at 13:51

## 2024-03-05 RX ADMIN — ATORVASTATIN CALCIUM 20 MG: 20 TABLET, FILM COATED ORAL at 09:13

## 2024-03-05 RX ADMIN — MIDAZOLAM HYDROCHLORIDE 2 MG: 1 INJECTION, SOLUTION INTRAMUSCULAR; INTRAVENOUS at 12:35

## 2024-03-05 RX ADMIN — LEVOTHYROXINE SODIUM 200 MCG: 0.2 TABLET ORAL at 09:19

## 2024-03-05 RX ADMIN — ACETAMINOPHEN 650 MG: 325 TABLET ORAL at 21:49

## 2024-03-05 RX ADMIN — PIPERACILLIN SODIUM AND TAZOBACTAM SODIUM 3.38 G: 3; .375 INJECTION, SOLUTION INTRAVENOUS at 18:39

## 2024-03-05 RX ADMIN — PIPERACILLIN SODIUM AND TAZOBACTAM SODIUM 3.38 G: 3; .375 INJECTION, SOLUTION INTRAVENOUS at 00:32

## 2024-03-05 RX ADMIN — Medication 250 MCG: at 09:00

## 2024-03-05 RX ADMIN — VANCOMYCIN HYDROCHLORIDE 1000 MG: 1 INJECTION, SOLUTION INTRAVENOUS at 21:50

## 2024-03-05 RX ADMIN — PIPERACILLIN SODIUM AND TAZOBACTAM SODIUM 3.38 G: 3; .375 INJECTION, SOLUTION INTRAVENOUS at 14:06

## 2024-03-05 RX ADMIN — OXYCODONE HYDROCHLORIDE 10 MG: 5 TABLET ORAL at 21:49

## 2024-03-05 RX ADMIN — INSULIN HUMAN 1 UNITS: 100 INJECTION, SOLUTION PARENTERAL at 18:39

## 2024-03-05 RX ADMIN — VANCOMYCIN HYDROCHLORIDE 1000 MG: 1 INJECTION, SOLUTION INTRAVENOUS at 14:51

## 2024-03-05 RX ADMIN — ACETAMINOPHEN 650 MG: 325 TABLET ORAL at 16:57

## 2024-03-05 ASSESSMENT — PAIN - FUNCTIONAL ASSESSMENT
PAIN_FUNCTIONAL_ASSESSMENT: 0-10

## 2024-03-05 ASSESSMENT — PAIN SCALES - GENERAL
PAINLEVEL_OUTOF10: 5 - MODERATE PAIN
PAINLEVEL_OUTOF10: 7
PAINLEVEL_OUTOF10: 0 - NO PAIN
PAINLEVEL_OUTOF10: 9
PAINLEVEL_OUTOF10: 0 - NO PAIN
PAINLEVEL_OUTOF10: 0 - NO PAIN

## 2024-03-05 ASSESSMENT — PAIN DESCRIPTION - LOCATION
LOCATION: ABDOMEN
LOCATION: ABDOMEN

## 2024-03-05 NOTE — PROGRESS NOTES
Discharge Planning Note:       Dk Alas is a 43 y.o. male on day 4 of admission presenting with Abscess of abdominal wall.    [facility] referrals sent fr homecare services no company is accepting, the team made aware.              Katie Oliver RN TCC

## 2024-03-05 NOTE — POST-PROCEDURE NOTE
Interventional Radiology Brief Postprocedure Note    Attending: Joanie Burnette MD    Assistant: Giacomo Matamoros MD    Diagnosis: Gallbladder fossa fluid collection    Description of procedure:   Patient was brought to the procedure area.  Limited diagnostic scanning of the abdomen was performed which demonstrated a multiloculated complex fluid collection in the RUQ in the gallbladder fossa.  Subsequently, the patient was prepped and draped in the usual sterile manner.  Lidocaine was administered for local analgesia.  Subsequently, with a 5F Yueh, the fluid collection was accessed under ultrasound guidance.  The inner stylet was removed and a Scott wire was advanced into the fluid collection.  An 8 F pigtail drain was subsequently placed.  Approximately 5 ml of green purulent fluid was aspirated for pathology.  Patient tolerated the procedure well without immediate complications.    Anesthesia:  Local, moderate sedation    Complications: None    Estimated Blood Loss: none    Medications  As of 03/05/24 1251      piperacillin-tazobactam-dextrose (Zosyn) IV 3.375 g (mL/hr) Total dose:  13.5 g* Dosing weight:  104   *From user-documented volume     Date/Time Rate/Dose/Volume Action       03/02/24  0125 3.375 g - 100 mL/hr (over 30 min) New Bag      0155  (over 30 min) Stopped      0608 3.375 g - 100 mL/hr (over 30 min) New Bag      0638  (over 30 min) Stopped      1225 3.375 g - 100 mL/hr (over 30 min) New Bag      1255 150 mL Stopped      1702 3.375 g - 100 mL/hr (over 30 min) New Bag      1732 50 mL Stopped     03/03/24  0106 3.375 g - 100 mL/hr (over 30 min) New Bag      0136  (over 30 min) Stopped      0611 3.375 g - 100 mL/hr (over 30 min) New Bag      0641  (over 30 min) Stopped      1214 3.375 g - 100 mL/hr (over 30 min) New Bag      1244  (over 30 min) Stopped      1805 3.375 g - 100 mL/hr (over 30 min) New Bag      1835  (over 30 min) Stopped     03/04/24  0025 3.375 g - 100 mL/hr (over 30 min) New Bag      0055   (over 30 min) Stopped      0657 3.375 g - 100 mL/hr (over 30 min) New Bag      0727  (over 30 min) Stopped      1134 3.375 g - 100 mL/hr (over 30 min) New Bag      1204  (over 30 min) Stopped      1812 3.375 g - 100 mL/hr (over 30 min) New Bag      1842  (over 30 min) Stopped     03/05/24  0032 3.375 g - 100 mL/hr (over 30 min) New Bag      0102  (over 30 min) Stopped      0609 3.375 g - 100 mL/hr (over 30 min) New Bag      0639  (over 30 min) Stopped               lactated Ringer's infusion (mL/hr) Total volume:  3,000 mL* Dosing weight:  104   *From user-documented volume     Date/Time Rate/Dose/Volume Action       03/01/24  2241 100 mL/hr New Bag     03/02/24  0611 100 mL/hr - 750 mL Rate Verify      1300 681.67 mL       1703 100 mL/hr New Bag      1800 500 mL      03/03/24  0000 100 mL/hr Rate Verify      0146 100 mL/hr - 68.33 mL Rate Verify      0932 100 mL/hr New Bag      1100 923.33 mL       1500 76.67 mL Stopped               atorvastatin (Lipitor) tablet 20 mg (mg) Total dose:  80 mg Dosing weight:  104      Date/Time Rate/Dose/Volume Action       03/02/24  0854 20 mg Given     03/03/24  0929 20 mg Given     03/04/24  0850 20 mg Given     03/05/24  0913 20 mg Given               buPROPion XL (Wellbutrin XL) 24 hr tablet 300 mg (mg) Total dose:  1,200 mg Dosing weight:  104      Date/Time Rate/Dose/Volume Action       03/02/24  0854 300 mg Given     03/03/24  0929 300 mg Given     03/04/24  0850 300 mg Given     03/05/24  0913 300 mg Given               busPIRone (Buspar) tablet 5 mg (mg) Total dose:  0 mg*   *Administration not included in total     Date/Time Rate/Dose/Volume Action       03/01/24  2200 *5 mg Missed               cyanocobalamin (Vitamin B-12) tablet 250 mcg (mcg) Total dose:  750 mcg*   *Administration not included in total     Date/Time Rate/Dose/Volume Action       03/02/24  0900 *250 mcg Missed     03/03/24  0929 250 mcg Given     03/04/24  0850 250 mcg Given     03/05/24  0900 250 mcg  Given               levothyroxine (Synthroid, Levoxyl) tablet 200 mcg (mcg) Total dose:  600 mcg* Dosing weight:  104   *Administration not included in total     Date/Time Rate/Dose/Volume Action       03/02/24  0854 200 mcg Given     03/03/24  0611 200 mcg Given     03/05/24  0600 *200 mcg Missed      0919 200 mcg Given               enoxaparin (Lovenox) syringe 40 mg (mg) Total dose:  160 mg Dosing weight:  104      Date/Time Rate/Dose/Volume Action       03/01/24  2240 40 mg Given     03/02/24  2256 40 mg Given     03/03/24  2247 40 mg Given     03/04/24  2115 40 mg Given               acetaminophen (Tylenol) tablet 650 mg (mg) Total dose:  7,800 mg* Dosing weight:  104   *Administration not included in total     Date/Time Rate/Dose/Volume Action       03/01/24  2240 650 mg Given     03/02/24  0400 *650 mg Missed      0854 650 mg Given      1702 650 mg Given      2256 650 mg Given     03/03/24  0504 650 mg Given      0929 650 mg Given      1640 650 mg Given      2247 650 mg Given     03/04/24  0321 650 mg Given      1022 650 mg Given      1600 *650 mg Missed      2115 650 mg Given     03/05/24  0400 *650 mg Missed      0914 650 mg Given               oxyCODONE (Roxicodone) immediate release tablet 5 mg (mg) Total dose:  5 mg Dosing weight:  104      Date/Time Rate/Dose/Volume Action       03/04/24  2115 5 mg Given               oxyCODONE (Roxicodone) immediate release tablet 10 mg (mg) Total dose:  90 mg Dosing weight:  104      Date/Time Rate/Dose/Volume Action       03/02/24  0608 10 mg Given      1029 10 mg Given      1702 10 mg Given      2254 10 mg Given     03/03/24  0652 10 mg Given      1108 10 mg Given      1939 10 mg Given     03/04/24  0329 10 mg Given      1109 10 mg Given               insulin regular (HumuLIN R,NovoLIN R) injection 0-5 Units (Units) Total dose:  Cannot be calculated* Dosing weight:  104   *Administration dose not documented     Date/Time Rate/Dose/Volume Action       03/01/24  2200  *Not included in total Missed     03/02/24  0200 *Not included in total Missed      0600 *Not included in total Missed      1000 *Not included in total Missed      1400 *Not included in total Missed               insulin regular (HumuLIN R,NovoLIN R) injection 0-5 Units (Units) Total dose:  Cannot be calculated* Dosing weight:  96.1   *Administration dose not documented     Date/Time Rate/Dose/Volume Action       03/02/24  1700 *Not included in total Missed      2100 *Not included in total Missed     03/03/24  0800 *Not included in total Missed      1200 *Not included in total Missed      1700 *Not included in total Missed      2100 *Not included in total Missed     03/04/24  0800 *Not included in total Missed      1200 *Not included in total Missed      1700 *Not included in total Missed      2100 *Not included in total Missed     03/05/24  0800 *Not included in total Missed      1200 *Not included in total Missed               vancomycin (Vancocin) 1.25 g in dextrose 5 % in water (D5W) 250 mL IV (mL/hr) Total dose:  2,500 mg* Dosing weight:  104   *From user-documented volume     Date/Time Rate/Dose/Volume Action       03/02/24  0200 1.25 g - 220 mL/hr (over 75 min) New Bag      0315  (over 75 min) Stopped      1406 1.25 g - 220 mL/hr (over 75 min) New Bag      1521 550 mL [vol] Stopped     03/03/24  0304 1.25 g - 220 mL/hr (over 75 min) New Bag      0419  (over 75 min) Stopped      1419 1.25 g - 220 mL/hr (over 75 min) New Bag      1534  (over 75 min) Stopped     03/04/24  0321 1.25 g - 220 mL/hr (over 75 min) New Bag      0436  (over 75 min) Stopped      1459 1.25 g - 220 mL/hr (over 75 min) New Bag      1614  (over 75 min) Stopped     03/05/24  0302 1.25 g - 220 mL/hr (over 75 min) New Bag      0417  (over 75 min) Stopped               HYDROmorphone (Dilaudid) injection 0.4 mg (mg) Total dose:  0.4 mg Dosing weight:  104      Date/Time Rate/Dose/Volume Action       03/02/24  0123 0.4 mg Given                calcium carbonate (Tums) chewable tablet 500 mg (mg) Total dose:  1,000 mg* Dosing weight:  96.1   *Administration not included in total     Date/Time Rate/Dose/Volume Action       03/03/24  1108 500 mg Given     03/04/24  0900 *500 mg Missed     03/05/24  0913 500 mg Given               potassium chloride CR (Klor-Con M20) ER tablet 40 mEq (mEq) Total dose:  40 mEq Dosing weight:  96.1      Date/Time Rate/Dose/Volume Action       03/03/24  1214 40 mEq Given               dextrose 5%-0.45 % sodium chloride infusion (mL/hr) Total volume:  155 mL* Dosing weight:  96.1   *From user-documented volume     Date/Time Rate/Dose/Volume Action       03/03/24  1534 50 mL/hr New Bag      1600 21.67 mL       1840 133.33 mL                fentaNYL PF (Sublimaze) injection (mcg) Total dose:  100 mcg      Date/Time Rate/Dose/Volume Action       03/05/24  1235 100 mcg Given               midazolam (Versed) injection (mg) Total dose:  2 mg      Date/Time Rate/Dose/Volume Action       03/05/24  1235 2 mg Given                     See detailed result report with images in PACS.    The patient tolerated the procedure well without incident or complication and is in stable condition.     Giacomo Matamoros MD  Diagnostic Radiology, PGY-4, R3    NON-Urgent on call weekends and after hours weekdays (5pm - 5am) IR pager: [phone]  Urgent & emergent on call weekends and after hours weekdays (5pm-7am) IR pager: [phone]

## 2024-03-05 NOTE — CARE PLAN
Problem: Pain  Goal: My pain/discomfort is manageable  Outcome: Progressing     Problem: Safety  Goal: Patient will be injury free during hospitalization  Outcome: Progressing  Goal: I will remain free of falls  Outcome: Progressing     Problem: Daily Care  Goal: Daily care needs are met  Outcome: Progressing     Problem: Psychosocial Needs  Goal: Demonstrates ability to cope with hospitalization/illness  Outcome: Progressing  Goal: Collaborate with me, my family, and caregiver to identify my specific goals  Outcome: Progressing     Problem: Discharge Barriers  Goal: My discharge needs are met  Outcome: Progressing   The patient's goals for the shift include Rest and iv atb    The clinical goals for the shift include pain controlled throughout shift

## 2024-03-05 NOTE — PRE-PROCEDURE NOTE
INTERVENTIONAL RADIOLOGY PRE-PROCEDURE NOTE    Dk Alas is a 43 y.o. male with PMHx of open partial cholecystectomy who presents to the interventional radiology department for RUQ fluid collection drainage.    Procedure: RUQ fluid collection drainage    Indication for procedure: The primary encounter diagnosis was Abscess of abdominal wall. A diagnosis of Postoperative infection, unspecified type, initial encounter was also pertinent to this visit.    Past Medical History:   Diagnosis Date    ADHD (attention deficit hyperactivity disorder)     Anxiety     Cholelithiasis     Contusion of right shoulder     Follows with PT Jonnie Rader    Depression     Diabetes mellitus (CMS/HCC)     Manged by PCP    H/O electrocardiogram     NORMAL ECG in 2021    History of ERCP 01/09/2024    Hyperlipidemia     Hypertension     Hypothyroidism     Mediastinal mass 01/04/2024    Gen: Tyrone Benitez    Motion sickness     Obstructive jaundice     Per ERCP on 1/9/24    Pulmonary nodule     solid non-calcified pulmonary nodule measuring greater than 8 mm.      Past Surgical History:   Procedure Laterality Date    BILE DUCT STENT PLACEMENT      Stent placed 11/2023 for narrowing of CBD    CT ANGIO CHEST FOR PULMONARY EMBOLISM      CT scan on 11/28/23    CT ANGIO CORONARY ART WITH HEARTFLOW IF SCORE >30%  10/26/2021    CT ANGIO CORONARY ART WITH HEARTFLOW IF SCORE >30% 10/26/2021       Relevant Labs:   Lab Results   Component Value Date    CREATININE 1.30 03/05/2024    EGFR 69 03/05/2024    INR 1.2 (H) 02/19/2024    PROTIME 13.6 (H) 02/19/2024       Planned Sedation/Anesthesia: Moderate    Directed physical examination:    Normal appearance, behavior, cognition and NAD  Lungs: No increased work of breathing      Current Facility-Administered Medications:     acetaminophen (Tylenol) tablet 650 mg, 650 mg, oral, q6h, Gricel Ibanez MD, 650 mg at 03/05/24 0914    atorvastatin (Lipitor) tablet 20 mg, 20 mg, oral, Daily, Gricel Ibanez MD, 20  mg at 03/05/24 0913    buPROPion XL (Wellbutrin XL) 24 hr tablet 300 mg, 300 mg, oral, Daily, Gricel Ibanez MD, 300 mg at 03/05/24 0913    calcium carbonate (Tums) chewable tablet 500 mg, 500 mg, oral, Daily, Juliann Hutchison MD, 500 mg at 03/05/24 0913    cyanocobalamin (Vitamin B-12) tablet 250 mcg, 250 mcg, oral, Daily, Gricel Ibanez MD, 250 mcg at 03/05/24 0900    dextrose 10 % in water (D10W) infusion, 0.3 g/kg/hr, intravenous, Once PRN, Gricel Ibanez MD    dextrose 5%-0.45 % sodium chloride infusion, 50 mL/hr, intravenous, Continuous, Juliann Hutchison MD, Last Rate: 50 mL/hr at 03/03/24 1534, 50 mL/hr at 03/03/24 1534    dextrose 50 % injection 25 g, 25 g, intravenous, q15 min PRN, Gricel Ibanez MD    enoxaparin (Lovenox) syringe 40 mg, 40 mg, subcutaneous, q24h, Gricel Ibanez MD, 40 mg at 03/04/24 2115    glucagon (Glucagen) injection 1 mg, 1 mg, intramuscular, q15 min PRN, Gricel Ibanez MD    insulin regular (HumuLIN R,NovoLIN R) injection 0-5 Units, 0-5 Units, subcutaneous, With meals & nightly, Juliann Hutchison MD    levothyroxine (Synthroid, Levoxyl) tablet 200 mcg, 200 mcg, oral, Daily before breakfast, Gricel Ibanez MD, 200 mcg at 03/05/24 0919    naloxone (Narcan) injection 0.2 mg, 0.2 mg, intravenous, q5 min PRN, Gricel Ibanez MD    oxyCODONE (Roxicodone) immediate release tablet 10 mg, 10 mg, oral, q4h PRN, Gricel Ibanez MD, 10 mg at 03/04/24 1109    oxyCODONE (Roxicodone) immediate release tablet 5 mg, 5 mg, oral, q4h PRN, Gricel Ibanez MD, 5 mg at 03/04/24 2115    piperacillin-tazobactam-dextrose (Zosyn) IV 3.375 g, 3.375 g, intravenous, q6h, Gricel Ibanez MD, Stopped at 03/05/24 0639    vancomycin (Vancocin) 1,000 mg in dextrose 5% water 200 mL, 1,000 mg, intravenous, q12h, Gricel Ibanez MD     Mallampati: II (hard and soft palate, upper portion of tonsils anduvula visible)    ASA Score: ASA 2 - Patient with mild systemic disease with no functional limitations    Benefits, risks and  alternatives of procedure and planned sedation have been discussed with the patient and/or their representative. All questions answered and they agree to proceed.     Giacomo Matamoros MD  Diagnostic Radiology, PGY-4, R3      NON-Urgent on call weekends and after hours weekdays (5pm - 5am) IR pager: [phone]  Urgent & emergent on call weekends and after hours weekdays (5pm-7am) IR pager: [phone]

## 2024-03-05 NOTE — PROGRESS NOTES
[facility]  ACUTE CARE SURGERY - PROGRESS NOTE    Patient Name: Dk Alas  MRN: [id number]  Admit Date: 301  : 1980  AGE: 43 y.o.   GENDER: male  ==============================================================================  TODAY'S ASSESSMENT AND PLAN OF CARE:  43 year old male with a history of CBD stenosis (unclear etiology) s/p ERCP with stent and subsequent removal, post-stent removal cholecystitis s/p open partial, reconstituted cholecystectomy on 24 with Dr. Leroy who presents as a transfer from an OSH due to drainage from his wound and concern for abscess seen on imaging with a leukocytosis of 17.0, now nml. Pending IR drainage 3/5 (IR unable to get to patient on 3/4).     Plan:  - Pain control with tylenol, PO oxy  - Continue IV abx Vanc and Zosyn  - Daily packing changes of wound  - Regular diet. Currently NPO for IR drainage 3/  - Begin teaching patient how to pack wound in anticipation for discharge  - Continue D5 1/2 NS at 50 mL/hr   - Bactermia 3: Staph saprophyticus, Staph hominis. Will repeat Bcx 3/5, Fu  - Continue home meds     Discussed with attending Dr. Kurt Rodrigues MD  PGY1  General Surgery  Acute Care Surgery, Pager [phone]    ==============================================================================  CHIEF COMPLAINT / EVENTS LAST 24HRS / HPI:  No acute events overnight. Denies any concerns, is tolerating PO intake. Denies nausea and vomiting.     MEDICAL HISTORY / ROS:   Admission history and ROS reviewed.     PHYSICAL EXAM:  Heart Rate:  [61-94]   Temp:  [36.5 °C (97.7 °F)-37.3 °C (99.1 °F)]   Resp:  [18]   BP: (110-131)/(66-89)   SpO2:  [93 %-96 %]   Physical Exam    GEN: NAD ; resting comfortably in bed  HEAD: atraumatic   RESP: breathing comfortably on room air   CV: well perfused, regular rate, no tachycardia  ABD: soft, localized tenderness to the RUQ, RUQ incision and packed with nugauze, improving erythema,  nondistended  : no doyle   NEURO: no focal deficits appreciated   PSYCH: appropriate     LABS:  Results for orders placed or performed during the hospital encounter of 03/01/24 (from the past 24 hour(s))   POCT GLUCOSE   Result Value Ref Range    POCT Glucose 106 (H) 74 - 99 mg/dL   POCT GLUCOSE   Result Value Ref Range    POCT Glucose 141 (H) 74 - 99 mg/dL   POCT GLUCOSE   Result Value Ref Range    POCT Glucose 140 (H) 74 - 99 mg/dL   POCT GLUCOSE   Result Value Ref Range    POCT Glucose 133 (H) 74 - 99 mg/dL   Vancomycin   Result Value Ref Range    Vancomycin 24.9 (H) 5.0 - 20.0 ug/mL   CBC   Result Value Ref Range    WBC 6.3 4.4 - 11.3 x10*3/uL    nRBC 0.0 0.0 - 0.0 /100 WBCs    RBC 3.74 (L) 4.50 - 5.90 x10*6/uL    Hemoglobin 9.6 (L) 13.5 - 17.5 g/dL    Hematocrit 31.1 (L) 41.0 - 52.0 %    MCV 83 80 - 100 fL    MCH 25.7 (L) 26.0 - 34.0 pg    MCHC 30.9 (L) 32.0 - 36.0 g/dL    RDW 12.6 11.5 - 14.5 %    Platelets 551 (H) 150 - 450 x10*3/uL   Renal function panel   Result Value Ref Range    Glucose 123 (H) 74 - 99 mg/dL    Sodium 140 136 - 145 mmol/L    Potassium 3.9 3.5 - 5.3 mmol/L    Chloride 104 98 - 107 mmol/L    Bicarbonate 29 21 - 32 mmol/L    Anion Gap 11 10 - 20 mmol/L    Urea Nitrogen 8 6 - 23 mg/dL    Creatinine 1.30 0.50 - 1.30 mg/dL    eGFR 69 >60 mL/min/1.73m*2    Calcium 8.9 8.6 - 10.6 mg/dL    Phosphorus 4.2 2.5 - 4.9 mg/dL    Albumin 3.4 3.4 - 5.0 g/dL   Magnesium   Result Value Ref Range    Magnesium 2.22 1.60 - 2.40 mg/dL   Blood Culture    Specimen: Peripheral Venipuncture; Blood culture   Result Value Ref Range    Blood Culture Loaded on Instrument - Culture in progress    Blood Culture    Specimen: Peripheral Venipuncture; Blood culture   Result Value Ref Range    Blood Culture Loaded on Instrument - Culture in progress    POCT GLUCOSE   Result Value Ref Range    POCT Glucose 119 (H) 74 - 99 mg/dL     MEDICATIONS:  Current Facility-Administered Medications   Medication Dose Route Frequency  Provider Last Rate Last Admin    acetaminophen (Tylenol) tablet 650 mg  650 mg oral q6h Gricel Ibanez MD   650 mg at 03/05/24 0914    atorvastatin (Lipitor) tablet 20 mg  20 mg oral Daily Gricel Ibanez MD   20 mg at 03/05/24 0913    buPROPion XL (Wellbutrin XL) 24 hr tablet 300 mg  300 mg oral Daily Gricel Ibanez MD   300 mg at 03/05/24 0913    calcium carbonate (Tums) chewable tablet 500 mg  500 mg oral Daily Juliann Hutchison MD   500 mg at 03/05/24 0913    cyanocobalamin (Vitamin B-12) tablet 250 mcg  250 mcg oral Daily Gricel Ibanez MD   250 mcg at 03/05/24 0900    dextrose 10 % in water (D10W) infusion  0.3 g/kg/hr intravenous Once PRN Gricel Ibanez MD        dextrose 5%-0.45 % sodium chloride infusion  50 mL/hr intravenous Continuous Juliann Hutchison MD 50 mL/hr at 03/03/24 1534 50 mL/hr at 03/03/24 1534    dextrose 50 % injection 25 g  25 g intravenous q15 min PRN Gricel Ibanez MD        enoxaparin (Lovenox) syringe 40 mg  40 mg subcutaneous q24h Gricel Ibanez MD   40 mg at 03/04/24 2115    glucagon (Glucagen) injection 1 mg  1 mg intramuscular q15 min PRN Gricel Ibanez MD        insulin regular (HumuLIN R,NovoLIN R) injection 0-5 Units  0-5 Units subcutaneous With meals & nightly Juliann Hutchison MD        levothyroxine (Synthroid, Levoxyl) tablet 200 mcg  200 mcg oral Daily before breakfast Gricel Ibanez MD   200 mcg at 03/05/24 0919    naloxone (Narcan) injection 0.2 mg  0.2 mg intravenous q5 min PRN Gricel Ibanez MD        oxyCODONE (Roxicodone) immediate release tablet 10 mg  10 mg oral q4h PRN Gricel Ibanez MD   10 mg at 03/04/24 1109    oxyCODONE (Roxicodone) immediate release tablet 5 mg  5 mg oral q4h PRN Gricel Ibanez MD   5 mg at 03/04/24 2115    piperacillin-tazobactam-dextrose (Zosyn) IV 3.375 g  3.375 g intravenous q6h Gricel Ibanez MD   Stopped at 03/05/24 0639    vancomycin (Vancocin) 1,000 mg in dextrose 5% water 200 mL  1,000 mg intravenous q12h Gricel Ibanez MD            IMAGING SUMMARY:  (summary of new imaging findings, not a copy of dictation)      I have reviewed all laboratory and imaging results ordered/pertinent for today's encounter.

## 2024-03-05 NOTE — CONSULTS
"Vancomycin Dosing by Pharmacy- FOLLOW UP    Dk Alas is a 43 y.o. year old male who Pharmacy has been consulted for vancomycin dosing for other ABDOMINAL . Based on the patient's indication and renal status this patient is being dosed based on a goal AUC of 400-600.     Renal function is currently stable.    Current vancomycin dose: 1250 mg given every 12 hours    Estimated vancomycin AUC on current dose: 621 mg/L.hr     Visit Vitals  /68 (BP Location: Right arm, Patient Position: Lying)   Pulse 61   Temp 36.8 °C (98.2 °F) (Temporal)   Resp 18        Lab Results   Component Value Date    CREATININE 1.30 03/05/2024    CREATININE 1.10 03/04/2024    CREATININE 0.92 03/03/2024    CREATININE 0.94 03/02/2024        Patient weight is No results found for: \"PTWEIGHT\"    No results found for: \"CULTURE\"     I/O last 3 completed shifts:  In: 480 (5 mL/kg) [P.O.:480]  Out: - (0 mL/kg)   Weight: 96.1 kg   [unfilled]    Lab Results   Component Value Date    PATIENTTEMP 37.0 02/19/2024        Assessment/Plan    Above goal AUC. Orders placed for new vancomcyin regimen of 1000 every 12 hours to begin at 1000 on 3/5.    This dosing regimen is predicted by [employer] to result in the following pharmacokinetic parameters:    AUC24,ss: 503 mg/L.hr  Probability of AUC24 > 400: 91 %  Ctrough,ss: 16.8 mg/L  Probability of Ctrough,ss > 20: 27 %    The next level will be obtained on 3/7 at AM labs. May be obtained sooner if clinically indicated.   Will continue to monitor renal function daily while on vancomycin and order serum creatinine at least every 48 hours if not already ordered.  Follow for continued vancomycin needs, clinical response, and signs/symptoms of toxicity.       Kulwinder Henao, PharmD           "

## 2024-03-06 ENCOUNTER — PHARMACY VISIT (OUTPATIENT)
Dept: PHARMACY | Facility: CLINIC | Age: 44
End: 2024-03-06
Payer: COMMERCIAL

## 2024-03-06 LAB
ALBUMIN SERPL BCP-MCNC: 3.5 G/DL (ref 3.4–5)
ANION GAP SERPL CALC-SCNC: 14 MMOL/L (ref 10–20)
BUN SERPL-MCNC: 8 MG/DL (ref 6–23)
CALCIUM SERPL-MCNC: 9.3 MG/DL (ref 8.6–10.6)
CHLORIDE SERPL-SCNC: 104 MMOL/L (ref 98–107)
CO2 SERPL-SCNC: 25 MMOL/L (ref 21–32)
CREAT SERPL-MCNC: 1.22 MG/DL (ref 0.5–1.3)
EGFRCR SERPLBLD CKD-EPI 2021: 75 ML/MIN/1.73M*2
ERYTHROCYTE [DISTWIDTH] IN BLOOD BY AUTOMATED COUNT: 12.7 % (ref 11.5–14.5)
GLUCOSE BLD MANUAL STRIP-MCNC: 101 MG/DL (ref 74–99)
GLUCOSE BLD MANUAL STRIP-MCNC: 117 MG/DL (ref 74–99)
GLUCOSE BLD MANUAL STRIP-MCNC: 126 MG/DL (ref 74–99)
GLUCOSE BLD MANUAL STRIP-MCNC: 154 MG/DL (ref 74–99)
GLUCOSE SERPL-MCNC: 118 MG/DL (ref 74–99)
HCT VFR BLD AUTO: 32.3 % (ref 41–52)
HGB BLD-MCNC: 10.3 G/DL (ref 13.5–17.5)
MAGNESIUM SERPL-MCNC: 2.05 MG/DL (ref 1.6–2.4)
MCH RBC QN AUTO: 26.4 PG (ref 26–34)
MCHC RBC AUTO-ENTMCNC: 31.9 G/DL (ref 32–36)
MCV RBC AUTO: 83 FL (ref 80–100)
NRBC BLD-RTO: 0 /100 WBCS (ref 0–0)
PHOSPHATE SERPL-MCNC: 4.2 MG/DL (ref 2.5–4.9)
PLATELET # BLD AUTO: 570 X10*3/UL (ref 150–450)
POTASSIUM SERPL-SCNC: 4 MMOL/L (ref 3.5–5.3)
RBC # BLD AUTO: 3.9 X10*6/UL (ref 4.5–5.9)
SODIUM SERPL-SCNC: 139 MMOL/L (ref 136–145)
WBC # BLD AUTO: 7.3 X10*3/UL (ref 4.4–11.3)

## 2024-03-06 PROCEDURE — 2500000001 HC RX 250 WO HCPCS SELF ADMINISTERED DRUGS (ALT 637 FOR MEDICARE OP)

## 2024-03-06 PROCEDURE — 82947 ASSAY GLUCOSE BLOOD QUANT: CPT

## 2024-03-06 PROCEDURE — 2500000001 HC RX 250 WO HCPCS SELF ADMINISTERED DRUGS (ALT 637 FOR MEDICARE OP): Performed by: NURSE PRACTITIONER

## 2024-03-06 PROCEDURE — 83735 ASSAY OF MAGNESIUM: CPT | Performed by: PODIATRIST

## 2024-03-06 PROCEDURE — 80069 RENAL FUNCTION PANEL: CPT | Performed by: PODIATRIST

## 2024-03-06 PROCEDURE — 85027 COMPLETE CBC AUTOMATED: CPT | Performed by: PODIATRIST

## 2024-03-06 PROCEDURE — 99232 SBSQ HOSP IP/OBS MODERATE 35: CPT | Performed by: NURSE PRACTITIONER

## 2024-03-06 PROCEDURE — 2500000004 HC RX 250 GENERAL PHARMACY W/ HCPCS (ALT 636 FOR OP/ED)

## 2024-03-06 PROCEDURE — 1100000001 HC PRIVATE ROOM DAILY

## 2024-03-06 PROCEDURE — 36415 COLL VENOUS BLD VENIPUNCTURE: CPT | Performed by: PODIATRIST

## 2024-03-06 RX ORDER — AMOXICILLIN AND CLAVULANATE POTASSIUM 875; 125 MG/1; MG/1
1 TABLET, FILM COATED ORAL EVERY 12 HOURS SCHEDULED
Status: DISCONTINUED | OUTPATIENT
Start: 2024-03-06 | End: 2024-03-07

## 2024-03-06 RX ORDER — AMOXICILLIN AND CLAVULANATE POTASSIUM 875; 125 MG/1; MG/1
1 TABLET, FILM COATED ORAL EVERY 12 HOURS SCHEDULED
Qty: 8 TABLET | Refills: 0 | Status: SHIPPED | OUTPATIENT
Start: 2024-03-06 | End: 2024-03-07 | Stop reason: HOSPADM

## 2024-03-06 RX ADMIN — PIPERACILLIN SODIUM AND TAZOBACTAM SODIUM 3.38 G: 3; .375 INJECTION, SOLUTION INTRAVENOUS at 01:24

## 2024-03-06 RX ADMIN — ACETAMINOPHEN 650 MG: 325 TABLET ORAL at 21:57

## 2024-03-06 RX ADMIN — BUPROPION HYDROCHLORIDE 300 MG: 150 TABLET, EXTENDED RELEASE ORAL at 08:45

## 2024-03-06 RX ADMIN — AMOXICILLIN AND CLAVULANATE POTASSIUM 1 TABLET: 875; 125 TABLET, FILM COATED ORAL at 20:11

## 2024-03-06 RX ADMIN — OXYCODONE HYDROCHLORIDE 10 MG: 5 TABLET ORAL at 16:09

## 2024-03-06 RX ADMIN — ACETAMINOPHEN 650 MG: 325 TABLET ORAL at 16:09

## 2024-03-06 RX ADMIN — ENOXAPARIN SODIUM 40 MG: 100 INJECTION SUBCUTANEOUS at 21:57

## 2024-03-06 RX ADMIN — AMOXICILLIN AND CLAVULANATE POTASSIUM 1 TABLET: 875; 125 TABLET, FILM COATED ORAL at 08:45

## 2024-03-06 RX ADMIN — Medication 250 MCG: at 08:45

## 2024-03-06 RX ADMIN — ACETAMINOPHEN 650 MG: 325 TABLET ORAL at 08:46

## 2024-03-06 RX ADMIN — LEVOTHYROXINE SODIUM 200 MCG: 0.2 TABLET ORAL at 05:10

## 2024-03-06 RX ADMIN — PIPERACILLIN SODIUM AND TAZOBACTAM SODIUM 3.38 G: 3; .375 INJECTION, SOLUTION INTRAVENOUS at 05:12

## 2024-03-06 RX ADMIN — ATORVASTATIN CALCIUM 20 MG: 20 TABLET, FILM COATED ORAL at 08:45

## 2024-03-06 ASSESSMENT — PAIN - FUNCTIONAL ASSESSMENT
PAIN_FUNCTIONAL_ASSESSMENT: 0-10

## 2024-03-06 ASSESSMENT — PAIN SCALES - GENERAL
PAINLEVEL_OUTOF10: 0 - NO PAIN
PAINLEVEL_OUTOF10: 7
PAINLEVEL_OUTOF10: 3
PAINLEVEL_OUTOF10: 0 - NO PAIN
PAINLEVEL_OUTOF10: 5 - MODERATE PAIN
PAINLEVEL_OUTOF10: 0 - NO PAIN

## 2024-03-06 NOTE — CONSULTS
Vancomycin Dosing by Pharmacy- Cessation of Therapy    Consult to pharmacy for vancomycin dosing has been discontinued by the prescriber, pharmacy will sign off at this time.    Please call pharmacy if there are further questions or re-enter a consult if vancomycin is resumed.     Waldemar Garg, [facility]

## 2024-03-06 NOTE — PROGRESS NOTES
[facility]  ACUTE CARE SURGERY - PROGRESS NOTE    Patient Name: Dk Alas  MRN: [id number]  Admit Date: 301  : 1980  AGE: 43 y.o.   GENDER: male  ==============================================================================  TODAY'S ASSESSMENT AND PLAN OF CARE:  43 year old male with a history of CBD stenosis (unclear etiology) s/p ERCP with stent and subsequent removal, post-stent removal cholecystitis s/p open partial, reconstituted cholecystectomy on 24 with Dr. Leroy who presents as a transfer from an OSH due to drainage from his wound and concern for abscess seen on imaging with a leukocytosis of 17.0, now nml. Pending IR drainage 3/5 (IR unable to get to patient on 3/4).     Plan:  - Pain control with tylenol, PO oxy  - Continue home meds: Wellbutrin, Lipitor, Vitamins and Synthroid  - Regular diet as tolerated  - Monitor drain output   - Daily packing changes of wound- continue educating patient on dressing changes   - Bactermia 3/1: Staph saprophyticus, Staph hominis. Follow up repeat cultures   - Transition to PO Augmentin today with tentative end date of 3/10.   - DVT Proph: SCDs, Lovenox    Dispo: Home likely tomorrow 3/7     Discussed with attending Dr. Kurt GUILLAUME-CNP   Acute Care Surgery, Pager [phone]    ==============================================================================  CHIEF COMPLAINT / EVENTS LAST 24HRS / HPI:  No acute events overnight. Pain controlled, tolerating a diet     MEDICAL HISTORY / ROS:   Admission history and ROS reviewed.     PHYSICAL EXAM:  Heart Rate:  [58-69]   Temp:  [36.1 °C (97 °F)-36.7 °C (98.1 °F)]   Resp:  [16-18]   BP: (120-148)/(71-92)   SpO2:  [92 %-100 %]   Physical Exam    GEN: NAD ; resting comfortably in bed  HEAD: atraumatic   RESP: breathing comfortably on room air   CV: well perfused, non cyanotic   ABD: soft, minimally tender on exam. RUQ incision with packing in place- wound bed pink and  clean- repacked with nugauze. Drain in place with light tinged bilious output.    NEURO: no focal deficits appreciated   PSYCH: appropriate mood and behavior     LABS:  Results for orders placed or performed during the hospital encounter of 03/01/24 (from the past 24 hour(s))   POCT GLUCOSE   Result Value Ref Range    POCT Glucose 136 (H) 74 - 99 mg/dL   Sterile Fluid Culture/Smear    Specimen: Intra-abdominal Abscess; Fluid   Result Value Ref Range    Gram Stain (4+) Abundant Polymorphonuclear leukocytes     Gram Stain No organisms seen    POCT GLUCOSE   Result Value Ref Range    POCT Glucose 154 (H) 74 - 99 mg/dL   POCT GLUCOSE   Result Value Ref Range    POCT Glucose 110 (H) 74 - 99 mg/dL   CBC   Result Value Ref Range    WBC 7.3 4.4 - 11.3 x10*3/uL    nRBC 0.0 0.0 - 0.0 /100 WBCs    RBC 3.90 (L) 4.50 - 5.90 x10*6/uL    Hemoglobin 10.3 (L) 13.5 - 17.5 g/dL    Hematocrit 32.3 (L) 41.0 - 52.0 %    MCV 83 80 - 100 fL    MCH 26.4 26.0 - 34.0 pg    MCHC 31.9 (L) 32.0 - 36.0 g/dL    RDW 12.7 11.5 - 14.5 %    Platelets 570 (H) 150 - 450 x10*3/uL   Renal function panel   Result Value Ref Range    Glucose 118 (H) 74 - 99 mg/dL    Sodium 139 136 - 145 mmol/L    Potassium 4.0 3.5 - 5.3 mmol/L    Chloride 104 98 - 107 mmol/L    Bicarbonate 25 21 - 32 mmol/L    Anion Gap 14 10 - 20 mmol/L    Urea Nitrogen 8 6 - 23 mg/dL    Creatinine 1.22 0.50 - 1.30 mg/dL    eGFR 75 >60 mL/min/1.73m*2    Calcium 9.3 8.6 - 10.6 mg/dL    Phosphorus 4.2 2.5 - 4.9 mg/dL    Albumin 3.5 3.4 - 5.0 g/dL   Magnesium   Result Value Ref Range    Magnesium 2.05 1.60 - 2.40 mg/dL   POCT GLUCOSE   Result Value Ref Range    POCT Glucose 117 (H) 74 - 99 mg/dL     MEDICATIONS:  Current Facility-Administered Medications   Medication Dose Route Frequency Provider Last Rate Last Admin    acetaminophen (Tylenol) tablet 650 mg  650 mg oral q6h Gricel Ibanez MD   650 mg at 03/06/24 0846    amoxicillin-pot clavulanate (Augmentin) 875-125 mg per tablet 1 tablet  1  tablet oral q12h [facility] Irineo Stubbs, APRN-CNP   1 tablet at 03/06/24 0845    atorvastatin (Lipitor) tablet 20 mg  20 mg oral Daily Gricel Ibanez MD   20 mg at 03/06/24 0845    buPROPion XL (Wellbutrin XL) 24 hr tablet 300 mg  300 mg oral Daily Gricel Ibanez MD   300 mg at 03/06/24 0845    calcium carbonate (Tums) chewable tablet 500 mg  500 mg oral Daily Juliann Hutchison MD   500 mg at 03/05/24 0913    cyanocobalamin (Vitamin B-12) tablet 250 mcg  250 mcg oral Daily Gricel Ibanez MD   250 mcg at 03/06/24 0845    dextrose 10 % in water (D10W) infusion  0.3 g/kg/hr intravenous Once PRN Gricel Ibanez MD        dextrose 50 % injection 25 g  25 g intravenous q15 min PRN Gricel Ibanez MD        enoxaparin (Lovenox) syringe 40 mg  40 mg subcutaneous q24h Gricel Ibanez MD   40 mg at 03/05/24 2149    glucagon (Glucagen) injection 1 mg  1 mg intramuscular q15 min PRN Gricel Ibanez MD        insulin regular (HumuLIN R,NovoLIN R) injection 0-5 Units  0-5 Units subcutaneous With meals & nightly Juliann Hutchison MD   1 Units at 03/05/24 1839    levothyroxine (Synthroid, Levoxyl) tablet 200 mcg  200 mcg oral Daily before breakfast Gricel Ibanez MD   200 mcg at 03/06/24 0510    naloxone (Narcan) injection 0.2 mg  0.2 mg intravenous q5 min PRN Gricel Ibanez MD        oxyCODONE (Roxicodone) immediate release tablet 10 mg  10 mg oral q4h PRN Gricel Ibanez MD   10 mg at 03/05/24 2149    oxyCODONE (Roxicodone) immediate release tablet 5 mg  5 mg oral q4h PRN Gricel Ibanez MD   5 mg at 03/04/24 2115       IMAGING SUMMARY:  (summary of new imaging findings, not a copy of dictation)      I have reviewed all laboratory and imaging results ordered/pertinent for today's encounter.

## 2024-03-06 NOTE — CARE PLAN
The patient's goals for the shift include pt remain stable     The clinical goals for the shift include pt will remain safe during shift      Problem: Pain  Goal: My pain/discomfort is manageable  Outcome: Progressing     Problem: Safety  Goal: Patient will be injury free during hospitalization  Outcome: Progressing  Goal: I will remain free of falls  Outcome: Progressing     Problem: Daily Care  Goal: Daily care needs are met  Outcome: Progressing     Problem: Psychosocial Needs  Goal: Demonstrates ability to cope with hospitalization/illness  Outcome: Progressing  Goal: Collaborate with me, my family, and caregiver to identify my specific goals  Outcome: Progressing

## 2024-03-07 ENCOUNTER — PHARMACY VISIT (OUTPATIENT)
Dept: PHARMACY | Facility: CLINIC | Age: 44
End: 2024-03-07
Payer: COMMERCIAL

## 2024-03-07 VITALS
WEIGHT: 211.9 LBS | RESPIRATION RATE: 18 BRPM | OXYGEN SATURATION: 93 % | TEMPERATURE: 97.3 F | SYSTOLIC BLOOD PRESSURE: 127 MMHG | HEIGHT: 70 IN | HEART RATE: 68 BPM | DIASTOLIC BLOOD PRESSURE: 80 MMHG | BODY MASS INDEX: 30.34 KG/M2

## 2024-03-07 LAB
ALBUMIN SERPL BCP-MCNC: 3.6 G/DL (ref 3.4–5)
ALBUMIN SERPL BCP-MCNC: 3.7 G/DL (ref 3.4–5)
ALBUMIN SERPL BCP-MCNC: 3.7 G/DL (ref 3.4–5)
ALP SERPL-CCNC: 118 U/L (ref 33–120)
ALP SERPL-CCNC: 118 U/L (ref 33–120)
ALT SERPL W P-5'-P-CCNC: 26 U/L (ref 10–52)
ALT SERPL W P-5'-P-CCNC: 27 U/L (ref 10–52)
ANION GAP SERPL CALC-SCNC: 11 MMOL/L (ref 10–20)
AST SERPL W P-5'-P-CCNC: 17 U/L (ref 9–39)
AST SERPL W P-5'-P-CCNC: 18 U/L (ref 9–39)
BILIRUB DIRECT SERPL-MCNC: 0.2 MG/DL (ref 0–0.3)
BILIRUB DIRECT SERPL-MCNC: 0.2 MG/DL (ref 0–0.3)
BILIRUB SERPL-MCNC: 0.6 MG/DL (ref 0–1.2)
BILIRUB SERPL-MCNC: 0.6 MG/DL (ref 0–1.2)
BUN SERPL-MCNC: 9 MG/DL (ref 6–23)
CALCIUM SERPL-MCNC: 9.3 MG/DL (ref 8.6–10.6)
CHLORIDE SERPL-SCNC: 103 MMOL/L (ref 98–107)
CO2 SERPL-SCNC: 28 MMOL/L (ref 21–32)
CREAT SERPL-MCNC: 1.19 MG/DL (ref 0.5–1.3)
EGFRCR SERPLBLD CKD-EPI 2021: 77 ML/MIN/1.73M*2
ERYTHROCYTE [DISTWIDTH] IN BLOOD BY AUTOMATED COUNT: 12.7 % (ref 11.5–14.5)
GLUCOSE BLD MANUAL STRIP-MCNC: 131 MG/DL (ref 74–99)
GLUCOSE SERPL-MCNC: 115 MG/DL (ref 74–99)
HCT VFR BLD AUTO: 33 % (ref 41–52)
HGB BLD-MCNC: 10.4 G/DL (ref 13.5–17.5)
MAGNESIUM SERPL-MCNC: 2.07 MG/DL (ref 1.6–2.4)
MCH RBC QN AUTO: 26.1 PG (ref 26–34)
MCHC RBC AUTO-ENTMCNC: 31.5 G/DL (ref 32–36)
MCV RBC AUTO: 83 FL (ref 80–100)
NRBC BLD-RTO: 0 /100 WBCS (ref 0–0)
PHOSPHATE SERPL-MCNC: 4.1 MG/DL (ref 2.5–4.9)
PLATELET # BLD AUTO: 535 X10*3/UL (ref 150–450)
POTASSIUM SERPL-SCNC: 3.8 MMOL/L (ref 3.5–5.3)
PROT SERPL-MCNC: 7.1 G/DL (ref 6.4–8.2)
PROT SERPL-MCNC: 7.1 G/DL (ref 6.4–8.2)
RBC # BLD AUTO: 3.98 X10*6/UL (ref 4.5–5.9)
SODIUM SERPL-SCNC: 138 MMOL/L (ref 136–145)
VANCOMYCIN SERPL-MCNC: 4.9 UG/ML (ref 5–20)
WBC # BLD AUTO: 6.7 X10*3/UL (ref 4.4–11.3)

## 2024-03-07 PROCEDURE — 80069 RENAL FUNCTION PANEL: CPT | Performed by: PODIATRIST

## 2024-03-07 PROCEDURE — 85027 COMPLETE CBC AUTOMATED: CPT | Performed by: PODIATRIST

## 2024-03-07 PROCEDURE — 80202 ASSAY OF VANCOMYCIN: CPT

## 2024-03-07 PROCEDURE — 36415 COLL VENOUS BLD VENIPUNCTURE: CPT

## 2024-03-07 PROCEDURE — 2500000001 HC RX 250 WO HCPCS SELF ADMINISTERED DRUGS (ALT 637 FOR MEDICARE OP)

## 2024-03-07 PROCEDURE — 82947 ASSAY GLUCOSE BLOOD QUANT: CPT

## 2024-03-07 PROCEDURE — 2500000001 HC RX 250 WO HCPCS SELF ADMINISTERED DRUGS (ALT 637 FOR MEDICARE OP): Performed by: NURSE PRACTITIONER

## 2024-03-07 PROCEDURE — RXMED WILLOW AMBULATORY MEDICATION CHARGE

## 2024-03-07 PROCEDURE — 82040 ASSAY OF SERUM ALBUMIN: CPT

## 2024-03-07 PROCEDURE — 83735 ASSAY OF MAGNESIUM: CPT | Performed by: PODIATRIST

## 2024-03-07 PROCEDURE — 36415 COLL VENOUS BLD VENIPUNCTURE: CPT | Performed by: PODIATRIST

## 2024-03-07 RX ORDER — SULFAMETHOXAZOLE AND TRIMETHOPRIM 800; 160 MG/1; MG/1
1 TABLET ORAL 2 TIMES DAILY
Qty: 10 TABLET | Refills: 0 | Status: SHIPPED | OUTPATIENT
Start: 2024-03-07 | End: 2024-03-12

## 2024-03-07 RX ADMIN — ATORVASTATIN CALCIUM 20 MG: 20 TABLET, FILM COATED ORAL at 09:00

## 2024-03-07 RX ADMIN — LEVOTHYROXINE SODIUM 200 MCG: 0.2 TABLET ORAL at 06:28

## 2024-03-07 RX ADMIN — Medication 250 MCG: at 09:00

## 2024-03-07 RX ADMIN — AMOXICILLIN AND CLAVULANATE POTASSIUM 1 TABLET: 875; 125 TABLET, FILM COATED ORAL at 09:00

## 2024-03-07 RX ADMIN — ACETAMINOPHEN 650 MG: 325 TABLET ORAL at 04:38

## 2024-03-07 RX ADMIN — BUPROPION HYDROCHLORIDE 300 MG: 150 TABLET, EXTENDED RELEASE ORAL at 09:00

## 2024-03-07 ASSESSMENT — PAIN - FUNCTIONAL ASSESSMENT
PAIN_FUNCTIONAL_ASSESSMENT: 0-10

## 2024-03-07 ASSESSMENT — PAIN SCALES - GENERAL
PAINLEVEL_OUTOF10: 0 - NO PAIN

## 2024-03-07 NOTE — CARE PLAN
Problem: Pain  Goal: My pain/discomfort is manageable  Outcome: Progressing     Problem: Safety  Goal: Patient will be injury free during hospitalization  Outcome: Progressing  Goal: I will remain free of falls  Outcome: Progressing   The patient's goals for the shift include Rest and iv atb    The clinical goals for the shift include Remain HDS    Over the shift, the patient did  make progress toward the following goals.

## 2024-03-07 NOTE — DISCHARGE SUMMARY
Discharge Diagnosis  Abscess of abdominal wall    Issues Requiring Follow-Up  Follow up with PCP  Follow up with IR  Follow up with ACS     Test Results Pending At Discharge  Pending Labs       Order Current Status    Blood Culture Preliminary result    Blood Culture Preliminary result    Sterile Fluid Culture/Smear Preliminary result            Hospital Course  43 year old male with a history of CBD stenosis (unclear etiology) s/p ERCP with stent and subsequent removal, post-stent removal cholecystitis s/p open partial, reconstituted cholecystectomy on 2/20/24 with Dr. Leroy who presents as a transfer from an OSH due to drainage from his wound and concern for abscess seen on imaging with a leukocytosis of 17.0, now nml. Pending IR drainage 3/5 (IR unable to get to patient on 3/4).      - Continue home meds: Wellbutrin, Lipitor, Vitamins and Synthroid     IR drain placed 3/5/2024        Plan:       NEURO/PAIN:  -multimodal pain management with tylenol, oxy     RESPIRATORY:  -IS     CARDIOVASC:   -VS q4 hours     GI:   -regular diet     ABDOMEN:   -Drain, bilious output  -daily wound packing changes  -Patient educated on dressing changes, drain  -ck LFTs today given output from drain, if LFTs okay, dc home     :   -monitor lytes, labs     MSK:   -OOB as tolerated  -PT/OT     HEMATOLOGIC:   -check labs, monitor H&H,     ENDOCRINE:   N/a     INFECTIOUS DISEASE:   - Transition to PO Augmentin yesterday with tentative end date of 3/10.   - Bactermia 3/1: Staph saprophyticus, Staph hominis.   Repeat cultures show resistance to augmentin, switch to Bactrim      Lines   -peripheral IV  -IR drain        Dispo: Home today pending LFTs     Discussed with attending Dr. Kurt Perea, [facility]  Acute Care Surgery    Pertinent Physical Exam At Time of Discharge  Physical Exam    PHYSICAL EXAM:  Heart Rate:  [68-79]   Temp:  [36.3 °C (97.3 °F)-36.9 °C (98.4 °F)]   Resp:  [18-20]   BP: (121-146)/(73-95)   SpO2:  [93 %-97  %]   Physical Exam    GEN: NAD ; resting comfortably in bed  HEAD: atraumatic   RESP: breathing comfortably on room air   CV: well perfused, non cyanotic   ABD: soft, minimally tender on exam. RUQ incision with packing in place- wound bed pink and clean- repacked with nugauze. Drain in place with bilious output.    NEURO: no focal deficits appreciated   PSYCH: appropriate mood and behavior     Home Medications     Medication List      START taking these medications     sulfamethoxazole-trimethoprim 800-160 mg tablet; Commonly known as:   Bactrim DS; Take 1 tablet by mouth 2 times a day for 5 days.     CONTINUE taking these medications     acetaminophen 325 mg tablet; Commonly known as: Tylenol; Take 2 tablets   (650 mg) by mouth every 4 hours if needed for mild pain (1 - 3) or fever   (temp greater than 38.0 C).   amphetamine-dextroamphetamine 15 mg tablet; Commonly known as: Adderall   atorvastatin 20 mg tablet; Commonly known as: Lipitor   buPROPion  mg 24 hr tablet; Commonly known as: Wellbutrin XL   busPIRone 5 mg tablet; Commonly known as: Buspar   cyanocobalamin 250 mcg tablet; Commonly known as: Vitamin B-12   FISH OIL ORAL   flaxseed oiL oil   levothyroxine 200 mcg tablet; Commonly known as: Synthroid, Levoxyl   lisinopril 10 mg tablet   metFORMIN 500 mg tablet; Commonly known as: Glucophage   methocarbamol 500 mg tablet; Commonly known as: Robaxin; Take 1 tablet   (500 mg) by mouth 3 times a day as needed for muscle spasms.   Mounjaro 10 mg/0.5 mL pen injector; Generic drug: tirzepatide   oxyCODONE 5 mg immediate release tablet; Commonly known as: Roxicodone;   Take 1 tablet (5 mg) by mouth every 6 hours if needed for moderate pain (4   - 6) for up to 10 doses.   pimecrolimus 1 % cream; Commonly known as: Nadya       Outpatient Follow-Up  Future Appointments   Date Time Provider Department [place]   3/12/2024  1:00 PM [facility] [id number] [id number] [facility]       Donna Perea PA-C

## 2024-03-07 NOTE — CARE PLAN
The patient's goals for the shift include pt remain stable     The clinical goals for the shift include Remain HDS      Problem: Pain  Goal: My pain/discomfort is manageable  Outcome: Progressing     Problem: Safety  Goal: Patient will be injury free during hospitalization  Outcome: Progressing  Goal: I will remain free of falls  Outcome: Progressing     Problem: Daily Care  Goal: Daily care needs are met  Outcome: Progressing     Problem: Psychosocial Needs  Goal: Demonstrates ability to cope with hospitalization/illness  Outcome: Progressing     Problem: Discharge Barriers  Goal: My discharge needs are met  Outcome: Progressing

## 2024-03-07 NOTE — NURSING NOTE
PIV was removed. Discharge instruction were discussed. Dressing change and MIKE drain teaching was done. Pt received his medication from pharmacy. Pt left in stable condition on foot.

## 2024-03-07 NOTE — PROGRESS NOTES
Discharge planning note:      Dk Alas is a 43 y.o. male on day 6 of admission presenting with Abscess of abdominal wall.      Planned for discharge home today out of the  homecare zip code range. [place] referrals placed for homecare with nursing. No agency accepted for home going needs, team made aware,. Education given at bedside for drain care and small wound. Will discharge with no needs.                    Katie Oliver RN T[facility]

## 2024-03-07 NOTE — DISCHARGE INSTRUCTIONS
Please follow up with your primary care provider at next available appointment following your hospital stay.     Follow up with: PCP, IR and ACS clinic    To schedule a follow up appointment with the Acute Care Surgery/ Trauma clinic, please call [phone] to schedule follow up appointment. This is not an emergency number, so if you have an emergency, please go to the Emergency Room. However, if you have questions regarding your care, upcoming appointments, etc, please do not hesitate to call the above number. Thank you!     Please go to the nearest hospital emergency room if you are experiencing: worsening abdominal pain, increasing abdominal distention, fevers, inability to tolerate food and drink (vomit every time you eat), nausea/vomiting, loss of bowel function (unable to pass gas or have bowel movements).     If you notice increased redness, tenderness or drainage around your surgical sites/wounds, or if you notice foul smelling drainage from your wounds please call the trauma clinic or go to the nearest to the emergency room.

## 2024-03-07 NOTE — PROGRESS NOTES
[facility]  ACUTE CARE SURGERY - PROGRESS NOTE    Patient Name: Dk Alas  MRN: [id number]  Admit Date: 301  : 1980  AGE: 43 y.o.   GENDER: male  ==============================================================================  TODAY'S ASSESSMENT AND PLAN OF CARE:  43 year old male with a history of CBD stenosis (unclear etiology) s/p ERCP with stent and subsequent removal, post-stent removal cholecystitis s/p open partial, reconstituted cholecystectomy on 24 with Dr. Leroy who presents as a transfer from an OSH due to drainage from his wound and concern for abscess seen on imaging with a leukocytosis of 17.0, now nml. Pending IR drainage 3/5 (IR unable to get to patient on 3/4).     - Continue home meds: Wellbutrin, Lipitor, Vitamins and Synthroid    IR drain placed 3/5/2024      Plan:      NEURO/PAIN:  -multimodal pain management with tylenol, oxy    RESPIRATORY:  -IS    CARDIOVASC:   -VS q4 hours     GI:   -regular diet    ABDOMEN:   -Drain, bilious output  -daily wound packing changes  -Patient educated on dressing changes, drain  -ck LFTs today given output from drain, if LFTs okay, dc home     :   -monitor lytes, labs     MSK:   -OOB as tolerated  -PT/OT    HEMATOLOGIC:   -check labs, monitor H&H,     ENDOCRINE:   N/a     INFECTIOUS DISEASE:   - Transition to PO Augmentin yesterday with tentative end date of 3/10.   - Bactermia 3/1: Staph saprophyticus, Staph hominis.   Repeat cultures show resistance to augmentin, switch to Bactrim      Lines   -peripheral IV  -IR drain      Dispo: Home today pending LFTs     Discussed with attending Dr. Kurt Perea PA-C  Acute Care Surgery    ==============================================================================  CHIEF COMPLAINT / EVENTS LAST 24HRS / HPI:  No acute events overnight. Pain controlled, tolerating a diet , mobilizing.    MEDICAL HISTORY / ROS:   Admission history and ROS reviewed.      PHYSICAL EXAM:  Heart Rate:  [68-79]   Temp:  [36.3 °C (97.3 °F)-36.9 °C (98.4 °F)]   Resp:  [18-20]   BP: (121-146)/(73-95)   SpO2:  [93 %-97 %]   Physical Exam    GEN: NAD ; resting comfortably in bed  HEAD: atraumatic   RESP: breathing comfortably on room air   CV: well perfused, non cyanotic   ABD: soft, minimally tender on exam. RUQ incision with packing in place- wound bed pink and clean- repacked with nugauze. Drain in place with bilious output.    NEURO: no focal deficits appreciated   PSYCH: appropriate mood and behavior     LABS:  Results for orders placed or performed during the hospital encounter of 03/01/24 (from the past 24 hour(s))   POCT GLUCOSE   Result Value Ref Range    POCT Glucose 154 (H) 74 - 99 mg/dL   POCT GLUCOSE   Result Value Ref Range    POCT Glucose 126 (H) 74 - 99 mg/dL   POCT GLUCOSE   Result Value Ref Range    POCT Glucose 101 (H) 74 - 99 mg/dL   CBC   Result Value Ref Range    WBC 6.7 4.4 - 11.3 x10*3/uL    nRBC 0.0 0.0 - 0.0 /100 WBCs    RBC 3.98 (L) 4.50 - 5.90 x10*6/uL    Hemoglobin 10.4 (L) 13.5 - 17.5 g/dL    Hematocrit 33.0 (L) 41.0 - 52.0 %    MCV 83 80 - 100 fL    MCH 26.1 26.0 - 34.0 pg    MCHC 31.5 (L) 32.0 - 36.0 g/dL    RDW 12.7 11.5 - 14.5 %    Platelets 535 (H) 150 - 450 x10*3/uL   Renal function panel   Result Value Ref Range    Glucose 115 (H) 74 - 99 mg/dL    Sodium 138 136 - 145 mmol/L    Potassium 3.8 3.5 - 5.3 mmol/L    Chloride 103 98 - 107 mmol/L    Bicarbonate 28 21 - 32 mmol/L    Anion Gap 11 10 - 20 mmol/L    Urea Nitrogen 9 6 - 23 mg/dL    Creatinine 1.19 0.50 - 1.30 mg/dL    eGFR 77 >60 mL/min/1.73m*2    Calcium 9.3 8.6 - 10.6 mg/dL    Phosphorus 4.1 2.5 - 4.9 mg/dL    Albumin 3.6 3.4 - 5.0 g/dL   Magnesium   Result Value Ref Range    Magnesium 2.07 1.60 - 2.40 mg/dL   Vancomycin   Result Value Ref Range    Vancomycin 4.9 (L) 5.0 - 20.0 ug/mL   Hepatic Function Panel   Result Value Ref Range    Albumin 3.7 3.4 - 5.0 g/dL    Bilirubin, Total 0.6 0.0 -  1.2 mg/dL    Bilirubin, Direct 0.2 0.0 - 0.3 mg/dL    Alkaline Phosphatase 118 33 - 120 U/L    ALT 26 10 - 52 U/L    AST 18 9 - 39 U/L    Total Protein 7.1 6.4 - 8.2 g/dL   POCT GLUCOSE   Result Value Ref Range    POCT Glucose 131 (H) 74 - 99 mg/dL   Hepatic function panel   Result Value Ref Range    Albumin 3.7 3.4 - 5.0 g/dL    Bilirubin, Total 0.6 0.0 - 1.2 mg/dL    Bilirubin, Direct 0.2 0.0 - 0.3 mg/dL    Alkaline Phosphatase 118 33 - 120 U/L    ALT 27 10 - 52 U/L    AST 17 9 - 39 U/L    Total Protein 7.1 6.4 - 8.2 g/dL     MEDICATIONS:  Current Facility-Administered Medications   Medication Dose Route Frequency Provider Last Rate Last Admin    acetaminophen (Tylenol) tablet 650 mg  650 mg oral q6h Griecl Ibanez MD   650 mg at 03/07/24 0438    amoxicillin-pot clavulanate (Augmentin) 875-125 mg per tablet 1 tablet  1 tablet oral q12h [facility] Irineo Stubbs, APRN-CNP   1 tablet at 03/07/24 0900    atorvastatin (Lipitor) tablet 20 mg  20 mg oral Daily Gricel Ibanez MD   20 mg at 03/07/24 0900    buPROPion XL (Wellbutrin XL) 24 hr tablet 300 mg  300 mg oral Daily Gricel Ibanez MD   300 mg at 03/07/24 0900    calcium carbonate (Tums) chewable tablet 500 mg  500 mg oral Daily Juliann Hutchison MD   500 mg at 03/05/24 0913    cyanocobalamin (Vitamin B-12) tablet 250 mcg  250 mcg oral Daily Gricel Ibanez MD   250 mcg at 03/07/24 0900    dextrose 10 % in water (D10W) infusion  0.3 g/kg/hr intravenous Once PRN Gricel Ibanez MD        dextrose 50 % injection 25 g  25 g intravenous q15 min PRN Gricel Ibanez MD        enoxaparin (Lovenox) syringe 40 mg  40 mg subcutaneous q24h Gricel Ibanez MD   40 mg at 03/06/24 2157    glucagon (Glucagen) injection 1 mg  1 mg intramuscular q15 min PRN Gricel Ibanez MD        insulin regular (HumuLIN R,NovoLIN R) injection 0-5 Units  0-5 Units subcutaneous With meals & nightly Juliann Hutchison MD   1 Units at 03/05/24 1839    levothyroxine (Synthroid, Levoxyl) tablet 200 mcg  200 mcg  oral Daily before breakfast Gricel Ibanez MD   200 mcg at 03/07/24 0628    naloxone (Narcan) injection 0.2 mg  0.2 mg intravenous q5 min PRN Gricel Ibanez MD        oxyCODONE (Roxicodone) immediate release tablet 10 mg  10 mg oral q4h PRN Gricel Ibanez MD   10 mg at 03/06/24 1609    oxyCODONE (Roxicodone) immediate release tablet 5 mg  5 mg oral q4h PRN Gricel Ibanez MD   5 mg at 03/04/24 8102       IMAGING SUMMARY:  (summary of new imaging findings, not a copy of dictation)      I have reviewed all laboratory and imaging results ordered/pertinent for today's encounter.

## 2024-03-08 LAB
BACTERIA FLD CULT: ABNORMAL
GRAM STN SPEC: ABNORMAL
GRAM STN SPEC: ABNORMAL

## 2024-03-09 LAB
BACTERIA BLD CULT: NORMAL
BACTERIA BLD CULT: NORMAL

## 2024-03-12 ENCOUNTER — CLINICAL SUPPORT (OUTPATIENT)
Dept: SURGERY | Facility: CLINIC | Age: 44
End: 2024-03-12
Payer: COMMERCIAL

## 2024-03-12 VITALS
OXYGEN SATURATION: 98 % | HEART RATE: 73 BPM | HEIGHT: 70 IN | TEMPERATURE: 96.6 F | SYSTOLIC BLOOD PRESSURE: 132 MMHG | BODY MASS INDEX: 30.21 KG/M2 | DIASTOLIC BLOOD PRESSURE: 92 MMHG | WEIGHT: 211 LBS

## 2024-03-12 DIAGNOSIS — K81.0 ACUTE CHOLECYSTITIS: Primary | ICD-10-CM

## 2024-03-12 DIAGNOSIS — Z01.818 PREOP EXAMINATION: ICD-10-CM

## 2024-03-12 ASSESSMENT — PAIN SCALES - GENERAL: PAINLEVEL: 0-NO PAIN

## 2024-03-12 ASSESSMENT — ENCOUNTER SYMPTOMS
DEPRESSION: 0
OCCASIONAL FEELINGS OF UNSTEADINESS: 0
LOSS OF SENSATION IN FEET: 0

## 2024-03-21 NOTE — PROGRESS NOTES
[facility]  TRAUMA CLINIC PROGRESS NOTE    Patient Name: Dk Alas  MRN: [id number]  Admit Date:   : 1980  AGE: 43 y.o.   GENDER: male  ==============================================================================  CHIEF COMPLAINT:   S/p cholecystectomy     OTHER MEDICAL PROBLEMS:  HTN  DLD  T2DM  hypothyroidism  ADHD  anxiety  depression  posterior mediastinal mass s/p EUS+ERCP+biopsy on 2023  cholelithiasis and CBD stenosis s/p stent with interval resolution and removal of stent 1 month ago     INCIDENTAL FINDINGS:  NA     PROCEDURES:  2024: Laproscopic to open partial, reconsituted Cholecystectomy   IR drain placed 3/5/2024     PATHOLOGY:  FINAL DIAGNOSIS   A.  Gallbladder, cholecystectomy:  Marked acute and chronic cholecystitis with cholelithiasis and serosal fibroinflammatory reaction.     ==============================================================================  TODAY'S ASSESSMENT AND PLAN OF CARE:  S/p Cholecystectomy, IR drain, surgical wound  IR drain removed, patient tolerated well  No further packing to wound, keep wound clean and open to air    WOUND CARE  - Take daily showers  - Allow warm, soapy water to wash over wound  - Do not scrub at the wound  - When out of the shower, gently pat the wound dry.  - Do not apply lotions, ointments or creams  - Avoid soaking in bodies of water (bathtub, hot tubs, pools, lakes, etc) until wound is completely healed  - No heavy lifting > 15 lbs until 6 weeks after surgery      FOLLOW UP/CALL  - No further trauma/ACS follow up   - May return to work or school  - Return to clinic or ER sooner if pt. has any development of erythema, drainage, swelling, pain, fevers, or chills  - If you have questions or concerns that are not urgent, please feel free to call  [phone].  - Call [phone] to make additional appointment(s) as needed if unable to reschedule in office  today    ==============================================================================  HISTORY OF PRESENT ILLNESS  This is a 43 year old male with a history of CBD stenosis (unclear etiology) s/p ERCP with stent and subsequent removal, post-stent removal cholecystitis s/p open partial, reconstituted cholecystectomy on 2/20/24 with Dr. Leroy who presents as a transfer from an OSH due to drainage from his wound and concern for abscess seen on imaging with a leukocytosis of 17.0, now nml. IR drain placed on 3/5/2024. Patient started on PO augmentin (switched to bactrim at time of discharge due to resistance to augmentin/ culture results) with and end date of 3/10. At time of discharge patient tolerating PO, patient ambulatory and pain was managed well.      At time of discharge, recommended to follow up with PCP, IR and trauma/ACS clinic.     At his appointment on 3/12/24 patient is doing well. He is eating, drinking, voiding and having flatus, bowel movements. He states his drain has been putting out about 30 ml since discharge, he said at one point the amount did lessen to amount 25 ml however recently has been back to about 30 ml. He states the color has remained consistent, bilious output. Presents today for drain check.      He reports his drain output has been 20 ml or less, serous output    Wound care he has been continuing to attempt to pack, however he denies being able to get much in his wound    Otherwise no complaints and he has been doing well.  Patient is eating, drinking, voiding and having flatus, bowel movements.     MEDICAL HISTORY / ROS:  Admission history and ROS reviewed.   Patient denies:  fevers; chills; headache;  dizziness; chest pain; shortness of breath; nausea/vomiting/diarrhea/constipation; new/worsening abdominal pain or numbness/tingling/weakness of extremities.     Pertinent changes as follows:  none    PHYSICAL EXAM:  GCS 15, A+OX3, RRR, S1, S2, CTA=, no increased WOB.   Abd soft, nt,  nd. 1 drain, serous output    Wound location: RUQ abdomen  Wound length: 2 cm   Wound depth: minimal depth  Wound description: wound healing, pink tissue bed    MAEx4, DELON 5/5 x4, no extremity edema noted. 2+pp.         LABS:  No results found for this or any previous visit (from the past 24 hour(s)).  MEDICATIONS:  Current Outpatient Medications   Medication Sig Dispense Refill    acetaminophen (Tylenol) 325 mg tablet Take 2 tablets (650 mg) by mouth every 4 hours if needed for mild pain (1 - 3) or fever (temp greater than 38.0 C). 30 tablet 0    amphetamine-dextroamphetamine (Adderall) 15 mg tablet Take 1 tablet (15 mg) by mouth once daily in the morning. Do not crush or chew.      atorvastatin (Lipitor) 20 mg tablet Take 1 tablet (20 mg) by mouth once daily.      buPROPion XL (Wellbutrin XL) 300 mg 24 hr tablet Take 1 tablet (300 mg) by mouth once daily. Do not crush, chew, or split.      busPIRone (Buspar) 5 mg tablet Take by mouth 3 times a day as needed.      cyanocobalamin (Vitamin B-12) 250 mcg tablet Take by mouth once daily.      docosahexaenoic acid/epa (FISH OIL ORAL) Take by mouth.      flaxseed oiL oil       levothyroxine (Synthroid, Levoxyl) 200 mcg tablet Take 1 tablet (200 mcg) by mouth once daily in the morning. Take before meals.      lisinopril 10 mg tablet Take 1 tablet (10 mg) by mouth once daily.      metFORMIN (Glucophage) 500 mg tablet Take 1 tablet (500 mg) by mouth. Take 1 or 2 tablets daily at bedtime. Do not crush, chew, or split.      methocarbamol (Robaxin) 500 mg tablet Take 1 tablet (500 mg) by mouth 3 times a day as needed for muscle spasms. (Patient not taking: Reported on 3/12/2024) 10 tablet 0    oxyCODONE (Roxicodone) 5 mg immediate release tablet Take 1 tablet (5 mg) by mouth every 6 hours if needed for moderate pain (4 - 6) for up to 10 doses. (Patient not taking: Reported on 3/12/2024) 10 tablet 0    pimecrolimus (Elidel) 1 % cream Apply 1 Application topically once daily. To  face      tirzepatide (Mounjaro) 10 mg/0.5 mL pen injector Inject 10 mg under the skin. Takes one time weekly       No current facility-administered medications for this visit.       IMAGING SUMMARY:  (summary of new imaging findings, not a copy of dictation)  NA    I have reviewed all laboratory and imaging results ordered/pertinent for today's encounter.

## 2024-03-26 ENCOUNTER — CLINICAL SUPPORT (OUTPATIENT)
Dept: SURGERY | Facility: CLINIC | Age: 44
End: 2024-03-26
Payer: COMMERCIAL

## 2024-03-26 VITALS
WEIGHT: 218 LBS | BODY MASS INDEX: 31.21 KG/M2 | DIASTOLIC BLOOD PRESSURE: 77 MMHG | RESPIRATION RATE: 16 BRPM | HEART RATE: 86 BPM | HEIGHT: 70 IN | SYSTOLIC BLOOD PRESSURE: 128 MMHG

## 2024-03-26 DIAGNOSIS — Z51.89 VISIT FOR WOUND CHECK: Primary | ICD-10-CM

## 2024-03-26 DIAGNOSIS — Z48.03 CHANGE OR REMOVAL OF DRAINS: ICD-10-CM

## 2024-03-26 ASSESSMENT — PAIN SCALES - GENERAL: PAINLEVEL: 0-NO PAIN

## 2024-05-13 PROCEDURE — 93010 ELECTROCARDIOGRAM REPORT: CPT | Performed by: INTERNAL MEDICINE

## 2024-11-13 NOTE — CARE PLAN
The patient's goals for the shift include Rest and iv atb    The clinical goals for the shift include pain control      Problem: Pain  Goal: My pain/discomfort is manageable  Outcome: Progressing     Problem: Safety  Goal: Patient will be injury free during hospitalization  Outcome: Progressing  Goal: I will remain free of falls  Outcome: Progressing     Problem: Daily Care  Goal: Daily care needs are met  Outcome: Progressing     Problem: Psychosocial Needs  Goal: Demonstrates ability to cope with hospitalization/illness  Outcome: Progressing  Goal: Collaborate with me, my family, and caregiver to identify my specific goals  Outcome: Progressing     Problem: Discharge Barriers  Goal: My discharge needs are met  Outcome: Progressing      What Type Of Note Output Would You Prefer (Optional)?: Standard Output Have Your Spot(S) Been Treated In The Past?: has not been treated Hpi Title: Evaluation of a Skin Lesion

## (undated) DEVICE — CANNULA, DILATOR, W/RADICALLY EXPANDABLE SLEEVE, VERSASTEP PLUS, 12 MM

## (undated) DEVICE — TUBE, SALEM SUMP, 16 FR X 48IN, ENFIT

## (undated) DEVICE — DRAPE, TIBURON, SPLIT SHEET, REINF ADH STRIP, 77X122

## (undated) DEVICE — MANIFOLD, 4 PORT NEPTUNE STANDARD

## (undated) DEVICE — DRESSING, ADHESIVE, ISLAND, TELFA, 2 X 3.75 IN, LF

## (undated) DEVICE — TROCAR, OPTICAL, BLADELESS, 12MM, THREADED, 100MM LENGTH

## (undated) DEVICE — DRESSING, TRANSPARENT, TEGADERM, 4 X 10 IN

## (undated) DEVICE — SUTURE, ETHIBOND, XTRA, 30 IN, 0, CT-1, GREEN

## (undated) DEVICE — SUTURE, MONOCRYL, 3-0, 18 IN, PS2, UNDYED

## (undated) DEVICE — KIT, ROBOTIC, CUSTOM UHC

## (undated) DEVICE — GAUZE, KITTNER ROLL, STERILE

## (undated) DEVICE — CATHETER, THORACIC, STRAIGHT, ADULT, 28 FR, PVC

## (undated) DEVICE — ELECTRODE, ELECTROSURGICAL, BLADE, INSULATED, ENT/IMA, STERILE

## (undated) DEVICE — GOWN, SURGICAL, SMARTGOWN, XLARGE, STERILE

## (undated) DEVICE — GRASPER, LONG, BIPOLAR, DAVINCI XI

## (undated) DEVICE — TUBE SET, PNEUMOCLEAR, SMOKE EVACU, HIGH-FLOW

## (undated) DEVICE — BAG, TISSUE RETRIEVAL, 10MM, ANCHOR

## (undated) DEVICE — GRASPER, FENESTRATED TIP-UP XI

## (undated) DEVICE — COVER, CART, 45 X 27 X 48 IN, CLEAR

## (undated) DEVICE — ANTIFOG, SOLUTION, FOG-OUT

## (undated) DEVICE — STAPLER, RELOADABLE LINEAR, 30MM 3.5, BLUE

## (undated) DEVICE — Device

## (undated) DEVICE — DRAPE, COLUMN, DAVINCI XI

## (undated) DEVICE — HANDPIECE, POOLE SUCTION, W/O TUBING

## (undated) DEVICE — COLLECTION UNIT, DRAINAGE, THORACIC, SINGLE TUBE, DRY SUCTION, ATS COMPATIBLE, OASIS 3600, LF

## (undated) DEVICE — SPONGE, DISSECTOR, PEANUT, 3/8, STERILE 5 FOAM HOLDER"

## (undated) DEVICE — TOWEL, SURGICAL, NEURO, O/R, 16 X 26, BLUE, STERILE

## (undated) DEVICE — DRAPE, SHEET, ENDOSCOPY, GENERAL, FENESTRATED, ARMBOARD COVER, 98 X 123.5 IN, DISPOSABLE, LF, STERILE

## (undated) DEVICE — SPONGE, HEMOSTATIC, CELLULOSE, SURGICEL, 2 X 14 IN

## (undated) DEVICE — SHEET, SPLIT, CARDIOVASCULAR TIBURON

## (undated) DEVICE — STAPLER, SUREFORM 45, DAVINCI XI

## (undated) DEVICE — CATHETER TRAY, SURESTEP, 16FR, URINE METER W/STATLOCK

## (undated) DEVICE — RETRIEVAL SYSTEM, MONARCH, 10MM DISP ENDOSCOPIC

## (undated) DEVICE — SEAL, UNIVERSAL 5-8MM  XI

## (undated) DEVICE — CATHETER, URETERAL, OPEN END, 5 FR, 70 CM

## (undated) DEVICE — DRAPE, ARM XI

## (undated) DEVICE — SUTURE, VICRYL, 2-0, 36 IN, CT-1, UNDYED

## (undated) DEVICE — TROCAR, KII OPTICAL BLADELESS 5MM Z THREAD 100MM LNGTH

## (undated) DEVICE — RELOAD, SUREFORM 45, 4.6 BLACK

## (undated) DEVICE — FORCEPS, CADIERE, DAVINCI XI

## (undated) DEVICE — DRESSING, ISLAND, TELFA, 4 X 5 IN

## (undated) DEVICE — LOOP, VESSEL, MAXI, RED